# Patient Record
Sex: FEMALE | Race: BLACK OR AFRICAN AMERICAN | NOT HISPANIC OR LATINO | Employment: FULL TIME | URBAN - NONMETROPOLITAN AREA
[De-identification: names, ages, dates, MRNs, and addresses within clinical notes are randomized per-mention and may not be internally consistent; named-entity substitution may affect disease eponyms.]

---

## 2017-07-06 ENCOUNTER — LAB (OUTPATIENT)
Dept: LAB | Facility: HOSPITAL | Age: 37
End: 2017-07-06

## 2017-07-06 ENCOUNTER — OFFICE VISIT (OUTPATIENT)
Dept: CARDIOLOGY | Facility: CLINIC | Age: 37
End: 2017-07-06

## 2017-07-06 VITALS
DIASTOLIC BLOOD PRESSURE: 85 MMHG | BODY MASS INDEX: 36.16 KG/M2 | WEIGHT: 267 LBS | HEART RATE: 76 BPM | HEIGHT: 72 IN | SYSTOLIC BLOOD PRESSURE: 140 MMHG

## 2017-07-06 DIAGNOSIS — I71.019 AORTIC DISSECTION, THORACIC (HCC): ICD-10-CM

## 2017-07-06 DIAGNOSIS — Z98.890 HISTORY OF AORTIC ROOT REPAIR: ICD-10-CM

## 2017-07-06 DIAGNOSIS — I10 ESSENTIAL HYPERTENSION: ICD-10-CM

## 2017-07-06 DIAGNOSIS — Z98.890 H/O AORTIC ARCH REPAIR: ICD-10-CM

## 2017-07-06 DIAGNOSIS — I71.019 AORTIC DISSECTION, THORACIC (HCC): Primary | ICD-10-CM

## 2017-07-06 LAB
ALBUMIN SERPL-MCNC: 4 G/DL (ref 3.4–4.8)
ALBUMIN/GLOB SERPL: 1 G/DL (ref 1.1–1.8)
ALP SERPL-CCNC: 54 U/L (ref 38–126)
ALT SERPL W P-5'-P-CCNC: 25 U/L (ref 9–52)
ANION GAP SERPL CALCULATED.3IONS-SCNC: 12 MMOL/L (ref 5–15)
ARTICHOKE IGE QN: 83 MG/DL (ref 1–129)
AST SERPL-CCNC: 26 U/L (ref 14–36)
BASOPHILS # BLD AUTO: 0.01 10*3/MM3 (ref 0–0.2)
BASOPHILS NFR BLD AUTO: 0.2 % (ref 0–2)
BILIRUB SERPL-MCNC: 0.2 MG/DL (ref 0.2–1.3)
BUN BLD-MCNC: 22 MG/DL (ref 7–21)
BUN/CREAT SERPL: 12.4 (ref 7–25)
CALCIUM SPEC-SCNC: 8.8 MG/DL (ref 8.4–10.2)
CHLORIDE SERPL-SCNC: 106 MMOL/L (ref 95–110)
CHOLEST SERPL-MCNC: 159 MG/DL (ref 0–199)
CO2 SERPL-SCNC: 21 MMOL/L (ref 22–31)
CREAT BLD-MCNC: 1.78 MG/DL (ref 0.5–1)
DEPRECATED RDW RBC AUTO: 46.1 FL (ref 36.4–46.3)
EOSINOPHIL # BLD AUTO: 0.18 10*3/MM3 (ref 0–0.7)
EOSINOPHIL NFR BLD AUTO: 3.6 % (ref 0–7)
ERYTHROCYTE [DISTWIDTH] IN BLOOD BY AUTOMATED COUNT: 15.1 % (ref 11.5–14.5)
GFR SERPL CREATININE-BSD FRML MDRD: 39 ML/MIN/1.73 (ref 64–149)
GLOBULIN UR ELPH-MCNC: 3.9 GM/DL (ref 2.3–3.5)
GLUCOSE BLD-MCNC: 101 MG/DL (ref 60–100)
HCT VFR BLD AUTO: 32.9 % (ref 35–45)
HDLC SERPL-MCNC: 43 MG/DL (ref 60–200)
HGB BLD-MCNC: 10.4 G/DL (ref 12–15.5)
IMM GRANULOCYTES # BLD: 0.01 10*3/MM3 (ref 0–0.02)
IMM GRANULOCYTES NFR BLD: 0.2 % (ref 0–0.5)
LDLC/HDLC SERPL: 1.94 {RATIO} (ref 0–3.22)
LYMPHOCYTES # BLD AUTO: 1.66 10*3/MM3 (ref 0.6–4.2)
LYMPHOCYTES NFR BLD AUTO: 33.2 % (ref 10–50)
MCH RBC QN AUTO: 26.4 PG (ref 26.5–34)
MCHC RBC AUTO-ENTMCNC: 31.6 G/DL (ref 31.4–36)
MCV RBC AUTO: 83.5 FL (ref 80–98)
MONOCYTES # BLD AUTO: 0.4 10*3/MM3 (ref 0–0.9)
MONOCYTES NFR BLD AUTO: 8 % (ref 0–12)
NEUTROPHILS # BLD AUTO: 2.74 10*3/MM3 (ref 2–8.6)
NEUTROPHILS NFR BLD AUTO: 54.8 % (ref 37–80)
PLATELET # BLD AUTO: 262 10*3/MM3 (ref 150–450)
PMV BLD AUTO: 10.9 FL (ref 8–12)
POTASSIUM BLD-SCNC: 4.1 MMOL/L (ref 3.5–5.1)
PROT SERPL-MCNC: 7.9 G/DL (ref 6.3–8.6)
RBC # BLD AUTO: 3.94 10*6/MM3 (ref 3.77–5.16)
SODIUM BLD-SCNC: 139 MMOL/L (ref 137–145)
TRIGL SERPL-MCNC: 163 MG/DL (ref 20–199)
WBC NRBC COR # BLD: 5 10*3/MM3 (ref 3.2–9.8)

## 2017-07-06 PROCEDURE — 85025 COMPLETE CBC W/AUTO DIFF WBC: CPT | Performed by: INTERNAL MEDICINE

## 2017-07-06 PROCEDURE — 80061 LIPID PANEL: CPT | Performed by: INTERNAL MEDICINE

## 2017-07-06 PROCEDURE — 36415 COLL VENOUS BLD VENIPUNCTURE: CPT | Performed by: INTERNAL MEDICINE

## 2017-07-06 PROCEDURE — 80053 COMPREHEN METABOLIC PANEL: CPT | Performed by: INTERNAL MEDICINE

## 2017-07-06 PROCEDURE — 99213 OFFICE O/P EST LOW 20 MIN: CPT | Performed by: INTERNAL MEDICINE

## 2017-07-06 RX ORDER — MELATONIN
1000 DAILY
COMMUNITY
End: 2018-01-09

## 2017-07-06 RX ORDER — LOSARTAN POTASSIUM 100 MG/1
100 TABLET ORAL DAILY
COMMUNITY
End: 2021-09-21 | Stop reason: SDUPTHER

## 2017-07-06 NOTE — PROGRESS NOTES
Marta Chambers  36 y.o. female    07/06/2017  1. Aortic dissection, thoracic    2. Essential hypertension    3. H/O aortic arch repair    4. History of aortic root repair        History of Present Illness    Mrs. Chambers is here for follow-up of above stated problems.  She denied any chest pain or shortness of breath and reports that her blood pressure is in the normal range at home.  Her blood pressure however was noted to be elevated at 140/85 mmHg.  She has been compliant with her medications and I note that she was started on losartan by nephrology.  No signs of CHF was noted.        SUBJECTIVE    Allergies   Allergen Reactions   • Eggs Or Egg-Derived Products          Past Medical History:   Diagnosis Date   • Acute renal failure    • Anemia    • Aneurysm, aortic    • Aortic dissection    • Chest pain    • GERD (gastroesophageal reflux disease)    • Hemorrhoids    • Hypertension    • Obesity          Past Surgical History:   Procedure Laterality Date   • ASCENDING ARCH/HEMIARCH REPLACEMENT     • CARDIAC VALVE SURGERY     • HAND SURGERY     • OTHER SURGICAL HISTORY      aortic dissection repair   • TUBAL ABDOMINAL LIGATION           Family History   Problem Relation Age of Onset   • Sudden death Father      cardiac   • Other Father      ruptured aortic aneurysm   • Diabetes Other    • Hypertension Other          Social History     Social History   • Marital status: Single     Spouse name: N/A   • Number of children: N/A   • Years of education: N/A     Occupational History   • Not on file.     Social History Main Topics   • Smoking status: Former Smoker   • Smokeless tobacco: Never Used   • Alcohol use Yes      Comment: social   • Drug use: No   • Sexual activity: Defer     Other Topics Concern   • Not on file     Social History Narrative         Current Outpatient Prescriptions   Medication Sig Dispense Refill   • aspirin 81 MG chewable tablet Chew 81 mg Daily.     • Biotin 5000 MCG capsule Take 2 tablets by  "mouth Daily.     • cholecalciferol (VITAMIN D3) 1000 UNITS tablet Take 1,000 Units by mouth Daily.     • losartan (COZAAR) 50 MG tablet Take 50 mg by mouth Daily.     • metoprolol tartrate (LOPRESSOR) 50 MG tablet Take 1 tablet by mouth every 12 (twelve) hours.     • Multiple Vitamins-Minerals (MULTIVITAMIN ADULT PO) Take 1 tablet by mouth Daily.       No current facility-administered medications for this visit.          OBJECTIVE    /85  Pulse 76  Ht 72\" (182.9 cm)  Wt 267 lb (121 kg)  BMI 36.21 kg/m2        Review of Systems     Constitutional:  Denies recent weight loss, weight gain, fever or chills, no change in exercise tolerance     HENT:  Denies any hearing loss, epistaxis, hoarseness, or difficulty speaking.     Eyes: Wears eyeglasses or contact lenses     Respiratory:  Denies dyspnea with exertion,no cough, wheezing, or hemoptysis.     Cardiovascular: Negative for palpations, chest pain, orthopnea, PND, peripheral edema, syncope, or claudication.     Gastrointestinal:  Denies change in bowel habits, dyspepsia, ulcer disease, hematochezia, or melena.     Endocrine: Negative for cold intolerance, heat intolerance, polydipsia, polyphagia and polyuria. Denies any history of weight change, heat/cold intolerance, polydipsia, polyuria     Genitourinary: CKD      Musculoskeletal: Denies any history of arthritic symptoms or back problems     Skin:  Denies any change in hair or nails, rashes, or skin lesions.     Allergic/Immunologic: Negative.  Negative for environmental allergies, food allergies and immunocompromised state.     Neurological:  Denies any history of recurrent headaches, strokes, TIA, or seizure disorder.     Hematological: Denies any food allergies, seasonal allergies, bleeding disorders, or lymphadenopathy.       Physical Exam     Constitutional: Cooperative, alert and oriented, well-developed, well-nourished, in no acute distress.     HENT:   Head: Normocephalic, normal hair patterns, no " masses or tenderness.  Ears, Nose, and Throat: No gross abnormalities. No pallor or cyanosis.  Eyes: EOMS intact, PERRL, conjunctivae and lids unremarkable. Fundoscopic exam and visual fields not performed.   Neck: No palpable masses or adenopathy, no thyromegaly, no JVD, carotid pulses are full and equal bilaterally and without  Bruits.     Cardiovascular: Regular rhythm, S1 and S2 normal, no S3 or S4. 2/6 systolic murmur, gallops, or rubs detected.     Pulmonary/Chest: Chest: normal symmetry, no tenderness to palpation, normal respiratory excursion, no use of accessory muscles.            Pulmonary: Normal breath sounds. No rales or ronchi.    Abdominal: Abdomen soft, bowel sounds normoactive, no masses, no hepatosplenomegaly, non-tender, no bruits.     Musculoskeletal: No deformities, clubbing, cyanosis, erythema, or edema observed. There are no spinal abnormalities noted. Normal muscle strength and tone. Pulses full and equal in all extremities, no bruits auscultated.     Neurological: No gross motor or sensory deficits noted, affect appropriate, oriented to time, person, place.     Skin: Warm and dry to the touch, no apparent skin lesions or masses noted.     Psychiatric: She has a normal mood and affect. Her behavior is normal. Judgment and thought content normal.         Procedures      Lab Results   Component Value Date    WBC 5.00 07/06/2017    HGB 10.4 (L) 07/06/2017    HCT 32.9 (L) 07/06/2017    MCV 83.5 07/06/2017     07/06/2017     Lab Results   Component Value Date    GLUCOSE 75 02/13/2016    BUN 19 02/13/2016    CREATININE 1.6 (H) 02/13/2016    CO2 22 02/13/2016    CALCIUM 8.8 02/13/2016    ALBUMIN 3.8 02/13/2016    AST 44 (H) 02/13/2016    ALT 19 02/13/2016     No results found for: CHOL  Lab Results   Component Value Date    TRIG 77 10/22/2014     Lab Results   Component Value Date    HDL 44 10/22/2014     No results found for: LDLCALC  Lab Results   Component Value Date    LDL 84 10/22/2014      No results found for: HDLLDLRATIO  No components found for: CHOLHDL  Lab Results   Component Value Date    HGBA1C 5.3 10/23/2014     Lab Results   Component Value Date    TSH 0.30 (L) 02/13/2016           ASSESSMENT AND PLAN    Mrs. Chambers is stable as regards her heart with no evidence of angina or arrhythmia.  Her aortic valve appears to be functioning well.  To assess the left ventricle and valvular function and echocardiogram is being arranged and the aortic root size will be assessed.  I have continued her present medications including losartan and metoprolol.  I have asked her to keep a close watch on her blood pressure.  Lab tests indicated below have been ordered.  Diagnoses and all orders for this visit:    Aortic dissection, thoracic  -     Adult Transthoracic Echo Complete; Future  -     Lipid Panel; Future  -     Comprehensive Metabolic Panel  -     CBC & Differential  -     CBC Auto Differential    Essential hypertension  -     Adult Transthoracic Echo Complete; Future  -     Lipid Panel; Future  -     Comprehensive Metabolic Panel  -     CBC & Differential  -     CBC Auto Differential    H/O aortic arch repair  -     Adult Transthoracic Echo Complete; Future  -     Lipid Panel; Future  -     Comprehensive Metabolic Panel  -     CBC & Differential  -     CBC Auto Differential    History of aortic root repair  -     Adult Transthoracic Echo Complete; Future  -     Lipid Panel; Future  -     Comprehensive Metabolic Panel  -     CBC & Differential  -     CBC Auto Differential        Katerina Solis MD  7/6/2017  5:46 PM

## 2017-08-01 ENCOUNTER — TELEPHONE (OUTPATIENT)
Dept: CARDIOLOGY | Facility: CLINIC | Age: 37
End: 2017-08-01

## 2018-01-09 ENCOUNTER — OFFICE VISIT (OUTPATIENT)
Dept: CARDIOLOGY | Facility: CLINIC | Age: 38
End: 2018-01-09

## 2018-01-09 VITALS
WEIGHT: 265 LBS | BODY MASS INDEX: 35.89 KG/M2 | SYSTOLIC BLOOD PRESSURE: 142 MMHG | HEIGHT: 72 IN | HEART RATE: 64 BPM | DIASTOLIC BLOOD PRESSURE: 84 MMHG

## 2018-01-09 DIAGNOSIS — Z98.890 H/O AORTIC ARCH REPAIR: ICD-10-CM

## 2018-01-09 DIAGNOSIS — Z98.890 HISTORY OF AORTIC ROOT REPAIR: ICD-10-CM

## 2018-01-09 DIAGNOSIS — I10 ESSENTIAL HYPERTENSION: ICD-10-CM

## 2018-01-09 DIAGNOSIS — I71.019 AORTIC DISSECTION, THORACIC (HCC): Primary | ICD-10-CM

## 2018-01-09 PROCEDURE — 99214 OFFICE O/P EST MOD 30 MIN: CPT | Performed by: INTERNAL MEDICINE

## 2018-01-09 NOTE — PROGRESS NOTES
Marta Chambers  37 y.o. female    01/09/2018  1. Aortic dissection, thoracic    2. Essential hypertension    3. History of aortic root repair    4. H/O aortic arch repair        History of Present Illness    Ms. Chambers is here for follow-up of above stated problems.  She denied any chest pain, shortness of breath or palpitation.  Her blood pressure was noted to be mildly elevated and she indicates that she is under stress when she drives to see the doctor.  I have advised her to keep close watch on her blood pressure and if it is consistently elevated but could consider starting her on amlodipine 5 mg daily.  Her other issue is increased had a loss and wondered if this could be related to beta blockers.  Though it is possible, given her history of aortic dissection, I have advised her to continue beta blockers for now.  She will consider using Nioxin cream which has low dose minoxidil, to see if this will improve hair loss.  His could also help with her blood pressure.  She will be seen her primary care provider soon and will have blood work done.  She is known to have CK D.        SUBJECTIVE    No Known Allergies      Past Medical History:   Diagnosis Date   • Acute renal failure    • Anemia    • Aneurysm, aortic    • Aortic dissection    • Chest pain    • GERD (gastroesophageal reflux disease)    • Hemorrhoids    • Hypertension    • Obesity          Past Surgical History:   Procedure Laterality Date   • ASCENDING ARCH/HEMIARCH REPLACEMENT     • CARDIAC VALVE SURGERY     • HAND SURGERY     • OTHER SURGICAL HISTORY      aortic dissection repair   • TUBAL ABDOMINAL LIGATION           Family History   Problem Relation Age of Onset   • Sudden death Father      cardiac   • Other Father      ruptured aortic aneurysm   • Diabetes Other    • Hypertension Other          Social History     Social History   • Marital status: Single     Spouse name: N/A   • Number of children: N/A   • Years of education: N/A     Occupational  "History   • Not on file.     Social History Main Topics   • Smoking status: Former Smoker   • Smokeless tobacco: Never Used   • Alcohol use Yes      Comment: social   • Drug use: No   • Sexual activity: Defer     Other Topics Concern   • Not on file     Social History Narrative         Current Outpatient Prescriptions   Medication Sig Dispense Refill   • aspirin 81 MG chewable tablet Chew 81 mg Daily.     • Biotin 5000 MCG capsule Take 2 tablets by mouth Daily.     • losartan (COZAAR) 50 MG tablet Take 50 mg by mouth Daily.     • metoprolol tartrate (LOPRESSOR) 50 MG tablet Take 1 tablet by mouth every 12 (twelve) hours.     • Multiple Vitamins-Minerals (MULTIVITAMIN ADULT PO) Take 1 tablet by mouth Daily.       No current facility-administered medications for this visit.          OBJECTIVE    /84  Pulse 64  Ht 182.9 cm (72\")  Wt 120 kg (265 lb)  BMI 35.94 kg/m2        Review of Systems     Constitutional:  Denies recent weight loss, weight gain, fever or chills, no change in exercise tolerance     HENT:  Denies any hearing loss, epistaxis, hoarseness, or difficulty speaking. Hair loss    Eyes: Wears eyeglasses or contact lenses     Respiratory:  Denies dyspnea with exertion,no cough, wheezing, or hemoptysis.     Cardiovascular: Negative for palpations, chest pain, orthopnea, PND, peripheral edema, syncope, or claudication.     Gastrointestinal:  Denies change in bowel habits, dyspepsia, ulcer disease, hematochezia, or melena.     Endocrine: Negative for cold intolerance, heat intolerance, polydipsia, polyphagia and polyuria. Denies any history of weight change, heat/cold intolerance, polydipsia, polyuria     Genitourinary: Negative.      Musculoskeletal: Denies any history of arthritic symptoms or back problems     Skin:  Denies any change in hair or nails, rashes, or skin lesions.     Allergic/Immunologic: Negative.  Negative for environmental allergies, food allergies and immunocompromised state. "     Neurological:  Denies any history of recurrent headaches, strokes, TIA, or seizure disorder.     Hematological: Denies any food allergies, seasonal allergies, bleeding disorders, or lymphadenopathy.     Psychiatric/Behavioral: Denies any history of depression, substance abuse, or change in cognitive function.         Physical Exam     Constitutional: Cooperative, alert and oriented, well-developed, well-nourished, in no acute distress.     HENT:   Head: Normocephalic, normal hair patterns, no masses or tenderness.  Ears, Nose, and Throat: No gross abnormalities. No pallor or cyanosis. Dentition good.   Eyes: EOMS intact, PERRL, conjunctivae and lids unremarkable. Fundoscopic exam and visual fields not performed.   Neck: No palpable masses or adenopathy, no thyromegaly, no JVD, carotid pulses are full and equal bilaterally and without  Bruits.     Cardiovascular: Regular rhythm, S1 and S2 normal, no S3 or S4. Apical impulse not displaced. No murmurs, gallops, or rubs detected.     Pulmonary/Chest: Chest: normal symmetry, no tenderness to palpation, normal respiratory excursion, no intercostal retraction, no use of accessory muscles.            Pulmonary: Normal breath sounds. No rales or ronchi.    Abdominal: Abdomen soft, bowel sounds normoactive, no masses, no hepatosplenomegaly, non-tender, no bruits.     Musculoskeletal: No deformities, clubbing, cyanosis, erythema, or edema observed. There are no spinal abnormalities noted. Normal muscle strength and tone. Pulses full and equal in all extremities, no bruits auscultated.     Neurological: No gross motor or sensory deficits noted, affect appropriate, oriented to time, person, place.     Skin: Warm and dry to the touch, no apparent skin lesions or masses noted.     Psychiatric: She has a normal mood and affect. Her behavior is normal. Judgment and thought content normal.         Procedures      Lab Results   Component Value Date    WBC 5.00 07/06/2017    HGB  10.4 (L) 07/06/2017    HCT 32.9 (L) 07/06/2017    MCV 83.5 07/06/2017     07/06/2017     Lab Results   Component Value Date    GLUCOSE 101 (H) 07/06/2017    BUN 22 (H) 07/06/2017    CREATININE 1.78 (H) 07/06/2017    EGFRIFAFRI 39 (L) 07/06/2017    BCR 12.4 07/06/2017    CO2 21.0 (L) 07/06/2017    CALCIUM 8.8 07/06/2017    ALBUMIN 4.00 07/06/2017    LABIL2 1.0 (L) 07/06/2017    AST 26 07/06/2017    ALT 25 07/06/2017     Lab Results   Component Value Date    CHOL 159 07/06/2017     Lab Results   Component Value Date    TRIG 163 07/06/2017    TRIG 77 10/22/2014     Lab Results   Component Value Date    HDL 43 (L) 07/06/2017    HDL 44 10/22/2014     No results found for: LDLCALC  Lab Results   Component Value Date    LDL 84 10/22/2014     No results found for: HDLLDLRATIO  No components found for: CHOLHDL  Lab Results   Component Value Date    HGBA1C 5.3 10/23/2014     Lab Results   Component Value Date    TSH 0.30 (L) 02/13/2016           ASSESSMENT AND PLAN  Ms. Chambers is stable with no definite evidence of angina, arrhythmia or congestive heart failure.  I have advised her to keep a close watch and a blood pressure.  Present antiplatelet therapy with aspirin, antihypertensive therapy with losartan and metoprolol tartrate have been continued.    Marta was seen today for follow-up.    Diagnoses and all orders for this visit:    Aortic dissection, thoracic    Essential hypertension    History of aortic root repair    H/O aortic arch repair        Katerina Solis MD  1/9/2018  9:27 AM

## 2018-02-01 RX ORDER — METOPROLOL TARTRATE 50 MG/1
50 TABLET, FILM COATED ORAL EVERY 12 HOURS
Qty: 60 TABLET | Refills: 6 | Status: SHIPPED | OUTPATIENT
Start: 2018-02-01 | End: 2018-09-26 | Stop reason: SDUPTHER

## 2018-06-04 ENCOUNTER — OFFICE VISIT (OUTPATIENT)
Dept: FAMILY MEDICINE CLINIC | Facility: CLINIC | Age: 38
End: 2018-06-04

## 2018-06-04 VITALS
SYSTOLIC BLOOD PRESSURE: 166 MMHG | HEIGHT: 72 IN | BODY MASS INDEX: 35.35 KG/M2 | DIASTOLIC BLOOD PRESSURE: 92 MMHG | WEIGHT: 261 LBS | RESPIRATION RATE: 20 BRPM | HEART RATE: 68 BPM | OXYGEN SATURATION: 99 % | TEMPERATURE: 98.4 F

## 2018-06-04 DIAGNOSIS — I10 ESSENTIAL HYPERTENSION: Primary | ICD-10-CM

## 2018-06-04 DIAGNOSIS — M35.00 SJOGREN'S SYNDROME, WITH UNSPECIFIED ORGAN INVOLVEMENT (HCC): ICD-10-CM

## 2018-06-04 DIAGNOSIS — N18.9 CHRONIC KIDNEY DISEASE, UNSPECIFIED CKD STAGE: ICD-10-CM

## 2018-06-04 DIAGNOSIS — Z98.890 H/O AORTIC ARCH REPAIR: ICD-10-CM

## 2018-06-04 PROCEDURE — 99214 OFFICE O/P EST MOD 30 MIN: CPT | Performed by: NURSE PRACTITIONER

## 2018-06-04 RX ORDER — LOSARTAN POTASSIUM 25 MG/1
25 TABLET ORAL DAILY
COMMUNITY
End: 2020-01-27 | Stop reason: ALTCHOICE

## 2018-06-04 NOTE — PROGRESS NOTES
Subjective   Marta Chambers is a 37 y.o. female.     Here today to establish.  Was a patient of dr contreras, but has not actually seen a pcp in about 2 years.  She does see a cardiologist for hx of dissecting aorta, when this repair was done she was found to have renal disease with an elevated creatinine,  This is treated by a nephrologist.  She also sees a rheumatologist for sjogrens.   All are controlled.      Hypertension   This is a chronic problem. The problem is controlled. Pertinent negatives include no anxiety, blurred vision, chest pain, headaches, malaise/fatigue, neck pain, orthopnea, palpitations, peripheral edema, PND or sweats. There are no associated agents to hypertension. Risk factors: see above history. Past treatments include angiotensin blockers and beta blockers. Current antihypertension treatment includes beta blockers and angiotensin blockers. The current treatment provides significant improvement. There are no compliance problems.  Hypertensive end-organ damage includes kidney disease. There is no history of angina, CAD/MI, CVA, heart failure, left ventricular hypertrophy, PVD or retinopathy.   Chronic Kidney Disease   This is a chronic problem. The current episode started more than 1 year ago. The problem occurs constantly. Progression since onset: stable. Pertinent negatives include no abdominal pain, anorexia, arthralgias, change in bowel habit, chest pain, chills, congestion, coughing, diaphoresis, fatigue, fever, headaches, joint swelling, myalgias, nausea, neck pain, numbness, rash, sore throat, swollen glands, urinary symptoms, vertigo, visual change, vomiting or weakness. Nothing aggravates the symptoms.        The following portions of the patient's history were reviewed and updated as appropriate: allergies, current medications, past family history, past medical history, past social history, past surgical history and problem list.    Review of Systems   Constitutional:  Negative.  Negative for chills, diaphoresis, fatigue, fever and malaise/fatigue.   HENT: Negative.  Negative for congestion and sore throat.    Eyes: Negative for blurred vision.   Respiratory: Negative.  Negative for cough.    Cardiovascular: Negative.  Negative for chest pain, palpitations, orthopnea and PND.   Gastrointestinal: Negative.  Negative for abdominal pain, anorexia, change in bowel habit, nausea and vomiting.   Musculoskeletal: Negative.  Negative for arthralgias, joint swelling, myalgias and neck pain.   Skin: Negative.  Negative for rash.   Neurological: Negative.  Negative for vertigo, weakness and numbness.   Psychiatric/Behavioral: Negative.        Objective   Physical Exam   Constitutional: She is oriented to person, place, and time. She appears well-developed and well-nourished. No distress.   HENT:   Head: Normocephalic.   Eyes: Pupils are equal, round, and reactive to light.   Neck: Normal range of motion. Neck supple. No thyromegaly present.   Cardiovascular: Normal rate, regular rhythm and normal heart sounds.  Exam reveals no friction rub.    No murmur heard.  Pulmonary/Chest: Effort normal and breath sounds normal. No respiratory distress. She has no wheezes. She has no rales.   Abdominal: Soft.   Musculoskeletal: Normal range of motion.   Neurological: She is alert and oriented to person, place, and time.   Skin: Skin is warm and dry.   Psychiatric: She has a normal mood and affect. Thought content normal.   Nursing note and vitals reviewed.        Assessment/Plan   Marta was seen today for hypertension and chronic kidney disease.    Diagnoses and all orders for this visit:    Essential hypertension  -     Comprehensive metabolic panel; Future  -     Lipid panel; Future    H/O aortic arch repair    Chronic kidney disease, unspecified CKD stage    Sjogren's syndrome, with unspecified organ involvement      She does not need any refills at present, but will be needing new labs.  She will  see her cardiologist in the next 2 months.

## 2018-06-29 ENCOUNTER — LAB (OUTPATIENT)
Dept: LAB | Facility: CLINIC | Age: 38
End: 2018-06-29

## 2018-06-29 DIAGNOSIS — I10 ESSENTIAL HYPERTENSION: ICD-10-CM

## 2018-06-29 LAB
ALBUMIN SERPL-MCNC: 3.9 G/DL (ref 3.4–4.8)
ALBUMIN/GLOB SERPL: 1.1 G/DL (ref 1.1–1.8)
ALP SERPL-CCNC: 45 U/L (ref 38–126)
ALT SERPL W P-5'-P-CCNC: 21 U/L (ref 9–52)
ANION GAP SERPL CALCULATED.3IONS-SCNC: 12 MMOL/L (ref 5–15)
ARTICHOKE IGE QN: 80 MG/DL (ref 1–129)
AST SERPL-CCNC: 21 U/L (ref 14–36)
BILIRUB SERPL-MCNC: 0.2 MG/DL (ref 0.2–1.3)
BUN BLD-MCNC: 20 MG/DL (ref 7–21)
BUN/CREAT SERPL: 12.2 (ref 7–25)
CALCIUM SPEC-SCNC: 9 MG/DL (ref 8.4–10.2)
CHLORIDE SERPL-SCNC: 109 MMOL/L (ref 95–110)
CHOLEST SERPL-MCNC: 157 MG/DL (ref 0–199)
CO2 SERPL-SCNC: 19 MMOL/L (ref 22–31)
CREAT BLD-MCNC: 1.64 MG/DL (ref 0.5–1)
GFR SERPL CREATININE-BSD FRML MDRD: 42 ML/MIN/1.73 (ref 64–149)
GLOBULIN UR ELPH-MCNC: 3.6 GM/DL (ref 2.3–3.5)
GLUCOSE BLD-MCNC: 82 MG/DL (ref 60–100)
HDLC SERPL-MCNC: 48 MG/DL (ref 60–200)
LDLC/HDLC SERPL: 1.92 {RATIO} (ref 0–3.22)
POTASSIUM BLD-SCNC: 4.1 MMOL/L (ref 3.5–5.1)
PROT SERPL-MCNC: 7.5 G/DL (ref 6.3–8.6)
SODIUM BLD-SCNC: 140 MMOL/L (ref 137–145)
TRIGL SERPL-MCNC: 84 MG/DL (ref 20–199)

## 2018-06-29 PROCEDURE — 80053 COMPREHEN METABOLIC PANEL: CPT | Performed by: NURSE PRACTITIONER

## 2018-06-29 PROCEDURE — 80061 LIPID PANEL: CPT | Performed by: NURSE PRACTITIONER

## 2018-06-29 PROCEDURE — 36415 COLL VENOUS BLD VENIPUNCTURE: CPT | Performed by: NURSE PRACTITIONER

## 2018-09-12 ENCOUNTER — OFFICE VISIT (OUTPATIENT)
Dept: CARDIOLOGY | Facility: CLINIC | Age: 38
End: 2018-09-12

## 2018-09-12 VITALS
OXYGEN SATURATION: 100 % | SYSTOLIC BLOOD PRESSURE: 118 MMHG | HEART RATE: 77 BPM | DIASTOLIC BLOOD PRESSURE: 80 MMHG | HEIGHT: 72 IN | WEIGHT: 255 LBS | BODY MASS INDEX: 34.54 KG/M2

## 2018-09-12 DIAGNOSIS — I10 ESSENTIAL HYPERTENSION: Primary | ICD-10-CM

## 2018-09-12 DIAGNOSIS — I71.019 AORTIC DISSECTION, THORACIC (HCC): ICD-10-CM

## 2018-09-12 DIAGNOSIS — Z98.890 H/O AORTIC ARCH REPAIR: ICD-10-CM

## 2018-09-12 PROCEDURE — 99214 OFFICE O/P EST MOD 30 MIN: CPT | Performed by: INTERNAL MEDICINE

## 2018-09-12 NOTE — PROGRESS NOTES
Marta Chambers  37 y.o. female    09/12/2018  1. Essential hypertension    2. Aortic dissection, thoracic (CMS/HCC)    3. H/O aortic arch repair        History of Present Illness    Mrs. Chambers is here for follow-up of her above stated problems.  She has done well from a symptom standpoint and denied any chest pain, shortness of breath, palpitation, dizziness or syncope.  Her blood pressure was in the normal range.  Her CK D is been being monitored closely by nephrology and I see that HCTZ has been discontinued.  She is on 75 mg of losartan daily.        SUBJECTIVE    No Known Allergies      Past Medical History:   Diagnosis Date   • Acute renal failure (CMS/HCC)    • Anemia    • Aneurysm, aortic (CMS/HCC)    • Aortic dissection (CMS/HCC)    • Chest pain    • GERD (gastroesophageal reflux disease)    • Hemorrhoids    • Hypertension    • Obesity          Past Surgical History:   Procedure Laterality Date   • ASCENDING ARCH/HEMIARCH REPLACEMENT     • CARDIAC VALVE SURGERY     • HAND SURGERY     • OTHER SURGICAL HISTORY      aortic dissection repair   • TUBAL ABDOMINAL LIGATION           Family History   Problem Relation Age of Onset   • Sudden death Father         cardiac   • Other Father         ruptured aortic aneurysm   • Diabetes Other    • Hypertension Other          Social History     Social History   • Marital status: Single     Spouse name: N/A   • Number of children: N/A   • Years of education: N/A     Occupational History   • Not on file.     Social History Main Topics   • Smoking status: Former Smoker   • Smokeless tobacco: Never Used   • Alcohol use Yes      Comment: social   • Drug use: No   • Sexual activity: Defer     Other Topics Concern   • Not on file     Social History Narrative   • No narrative on file         Current Outpatient Prescriptions   Medication Sig Dispense Refill   • aspirin 81 MG chewable tablet Chew 81 mg Daily.     • Biotin 5000 MCG capsule Take 2 tablets by mouth Daily.     •  "losartan (COZAAR) 50 MG tablet Take 50 mg by mouth Daily.     • metoprolol tartrate (LOPRESSOR) 50 MG tablet Take 1 tablet by mouth Every 12 (Twelve) Hours. 60 tablet 6   • Multiple Vitamins-Minerals (MULTIVITAMIN ADULT PO) Take 1 tablet by mouth Daily.     • losartan (COZAAR) 25 MG tablet Take 25 mg by mouth Daily.       No current facility-administered medications for this visit.          OBJECTIVE    /80   Pulse 77   Ht 182.9 cm (72.01\")   Wt 116 kg (255 lb)   SpO2 100%   BMI 34.58 kg/m²         Review of Systems     Constitutional:  Denies recent weight loss, weight gain, fever or chills, no change in exercise tolerance     HENT:  Denies any hearing loss, epistaxis, hoarseness, or difficulty speaking.     Eyes: Wears eyeglasses or contact lenses     Respiratory:  Denies dyspnea with exertion,no cough, wheezing, or hemoptysis.     Cardiovascular: Negative for palpations, chest pain, orthopnea, PND, peripheral edema, syncope, or claudication.     Gastrointestinal:  Denies change in bowel habits, dyspepsia, ulcer disease, hematochezia, or melena.     Endocrine: Negative for cold intolerance, heat intolerance, polydipsia, polyphagia and polyuria.     Genitourinary: Negative.      Musculoskeletal: Denies any history of arthritic symptoms or back problems     Skin:  Denies any change in hair or nails, rashes, or skin lesions.     Allergic/Immunologic: Negative.  Negative for environmental allergies, food allergies and immunocompromised state.     Neurological:  Denies any history of recurrent headaches, strokes, TIA, or seizure disorder.     Hematological: Denies any food allergies, seasonal allergies, bleeding disorders, or lymphadenopathy.     Psychiatric/Behavioral: Denies any history of depression, substance abuse, or change in cognitive function.         Physical Exam     Constitutional: Cooperative, alert and oriented,  in no acute distress.     HENT:   Head: Normocephalic, normal hair patterns, no " masses or tenderness.  Ears, Nose, and Throat: No gross abnormalities. No pallor or cyanosis.   Eyes: EOMS intact, PERRL, conjunctivae and lids unremarkable. Fundoscopic exam and visual fields not performed.   Neck: No palpable masses or adenopathy, no thyromegaly, no JVD, carotid pulses are full and equal bilaterally and without  Bruits.     Cardiovascular: Regular rhythm, S1 and S2 normal, no S3 or S4.  No murmurs, gallops, or rubs detected.     Pulmonary/Chest: Chest: normal symmetry,  normal respiratory excursion, no intercostal retraction, no use of accessory muscles.            Pulmonary: Normal breath sounds. No rales or ronchi.    Abdominal: Abdomen soft, bowel sounds normoactive, no masses, no hepatosplenomegaly, non-tender, no bruits.     Musculoskeletal: No deformities, clubbing, cyanosis, erythema, or edema observed.     Neurological: No gross motor or sensory deficits noted, affect appropriate, oriented to time, person, place.     Skin: Warm and dry to the touch, no apparent skin lesions or masses noted.     Psychiatric: She has a normal mood and affect. Her behavior is normal. Judgment and thought content normal.         Procedures      Lab Results   Component Value Date    WBC 5.00 07/06/2017    HGB 10.4 (L) 07/06/2017    HCT 32.9 (L) 07/06/2017    MCV 83.5 07/06/2017     07/06/2017     Lab Results   Component Value Date    GLUCOSE 82 06/29/2018    BUN 20 06/29/2018    CREATININE 1.64 (H) 06/29/2018    EGFRIFAFRI 42 (L) 06/29/2018    BCR 12.2 06/29/2018    CO2 19.0 (L) 06/29/2018    CALCIUM 9.0 06/29/2018    ALBUMIN 3.90 06/29/2018    AST 21 06/29/2018    ALT 21 06/29/2018     Lab Results   Component Value Date    CHOL 157 06/29/2018    CHOL 159 07/06/2017     Lab Results   Component Value Date    TRIG 84 06/29/2018    TRIG 163 07/06/2017    TRIG 77 10/22/2014     Lab Results   Component Value Date    HDL 48 (L) 06/29/2018    HDL 43 (L) 07/06/2017    HDL 44 10/22/2014     No components found  for: LDLCALC  Lab Results   Component Value Date    LDL 80 06/29/2018    LDL 83 07/06/2017    LDL 84 10/22/2014     No results found for: HDLLDLRATIO  No components found for: CHOLHDL  Lab Results   Component Value Date    HGBA1C 5.3 10/23/2014     Lab Results   Component Value Date    TSH 0.30 (L) 02/13/2016           ASSESSMENT AND PLAN  Ms. Chambers is stable with no definite evidence of angina, arrhythmia or congestive heart failure.  Antiplatelet therapy with aspirin, antihypertensive therapy with losartan and metoprolol tartrate have been continued.  She still smokes about a pack of cigarettes every 5 days and I have strongly urged her to quit.    Marta was seen today for follow-up.    Diagnoses and all orders for this visit:    Essential hypertension    Aortic dissection, thoracic (CMS/HCC)    H/O aortic arch repair        Katerina Solis MD  9/12/2018  10:15 AM

## 2018-09-26 RX ORDER — METOPROLOL TARTRATE 50 MG/1
TABLET, FILM COATED ORAL
Qty: 60 TABLET | Refills: 5 | Status: SHIPPED | OUTPATIENT
Start: 2018-09-26 | End: 2019-03-24 | Stop reason: SDUPTHER

## 2018-12-04 ENCOUNTER — OFFICE VISIT (OUTPATIENT)
Dept: FAMILY MEDICINE CLINIC | Facility: CLINIC | Age: 38
End: 2018-12-04

## 2018-12-04 VITALS
DIASTOLIC BLOOD PRESSURE: 85 MMHG | WEIGHT: 259 LBS | HEIGHT: 72 IN | HEART RATE: 67 BPM | TEMPERATURE: 98.2 F | RESPIRATION RATE: 18 BRPM | OXYGEN SATURATION: 99 % | BODY MASS INDEX: 35.08 KG/M2 | SYSTOLIC BLOOD PRESSURE: 138 MMHG

## 2018-12-04 DIAGNOSIS — I10 ESSENTIAL HYPERTENSION: Primary | ICD-10-CM

## 2018-12-04 DIAGNOSIS — N18.9 CHRONIC KIDNEY DISEASE, UNSPECIFIED CKD STAGE: ICD-10-CM

## 2018-12-04 PROCEDURE — 99213 OFFICE O/P EST LOW 20 MIN: CPT | Performed by: NURSE PRACTITIONER

## 2018-12-04 NOTE — PROGRESS NOTES
Subjective   Marta Chambers is a 38 y.o. female.     Here today for kathleen on her htn.  She does not need refills today and has had labs drawn recently.  She reports that she has renal insuff, but is controlled and cont to see the nephrologist.      Hypertension   This is a chronic problem. The current episode started more than 1 year ago. The problem has been waxing and waning since onset. The problem is controlled. Associated symptoms include peripheral edema. Pertinent negatives include no anxiety, blurred vision, chest pain, headaches, malaise/fatigue, neck pain, orthopnea, palpitations, PND, shortness of breath or sweats. There are no associated agents to hypertension. Risk factors for coronary artery disease include obesity and sedentary lifestyle. Past treatments include beta blockers and angiotensin blockers. Current antihypertension treatment includes angiotensin blockers and beta blockers. The current treatment provides significant improvement. There are no compliance problems.  Hypertensive end-organ damage includes kidney disease.        The following portions of the patient's history were reviewed and updated as appropriate: allergies, current medications, past family history, past medical history, past social history, past surgical history and problem list.    Review of Systems   Constitutional: Negative.  Negative for malaise/fatigue.   HENT: Negative.    Eyes: Negative for blurred vision.   Respiratory: Negative.  Negative for shortness of breath.    Cardiovascular: Negative.  Negative for chest pain, palpitations, orthopnea and PND.   Musculoskeletal: Negative.  Negative for neck pain.   Skin: Negative.    Neurological: Negative.    Psychiatric/Behavioral: Negative.        Objective   Physical Exam   Constitutional: She is oriented to person, place, and time. She appears well-developed and well-nourished. No distress.   HENT:   Head: Normocephalic.   Eyes: Pupils are equal, round, and reactive to  light.   Neck: Neck supple. No thyromegaly present.   Cardiovascular: Normal rate, regular rhythm and normal heart sounds. Exam reveals no friction rub.   No murmur heard.  Pulmonary/Chest: Effort normal and breath sounds normal. No stridor. No respiratory distress. She has no wheezes. She has no rales.   Abdominal: Soft.   Musculoskeletal: Normal range of motion.   Neurological: She is alert and oriented to person, place, and time.   Skin: Skin is warm and dry.   Psychiatric: She has a normal mood and affect. Thought content normal.   Nursing note and vitals reviewed.        Assessment/Plan   Marta was seen today for hypertension and quit smoking.    Diagnoses and all orders for this visit:    Essential hypertension    Chronic kidney disease, unspecified CKD stage    no changes in meds.    Lab Results   Component Value Date    GLUCOSE 82 06/29/2018    BUN 20 06/29/2018    CREATININE 1.64 (H) 06/29/2018    EGFRIFAFRI 42 (L) 06/29/2018    BCR 12.2 06/29/2018    K 4.1 06/29/2018    CO2 19.0 (L) 06/29/2018    CALCIUM 9.0 06/29/2018    ALBUMIN 3.90 06/29/2018    AST 21 06/29/2018    ALT 21 06/29/2018

## 2019-03-25 RX ORDER — METOPROLOL TARTRATE 50 MG/1
TABLET, FILM COATED ORAL
Qty: 60 TABLET | Refills: 4 | Status: SHIPPED | OUTPATIENT
Start: 2019-03-25 | End: 2019-08-19 | Stop reason: SDUPTHER

## 2019-06-18 ENCOUNTER — OFFICE VISIT (OUTPATIENT)
Dept: FAMILY MEDICINE CLINIC | Facility: CLINIC | Age: 39
End: 2019-06-18

## 2019-06-18 ENCOUNTER — APPOINTMENT (OUTPATIENT)
Dept: LAB | Facility: HOSPITAL | Age: 39
End: 2019-06-18

## 2019-06-18 VITALS
WEIGHT: 259 LBS | HEART RATE: 66 BPM | DIASTOLIC BLOOD PRESSURE: 72 MMHG | HEIGHT: 72 IN | SYSTOLIC BLOOD PRESSURE: 125 MMHG | TEMPERATURE: 98.1 F | BODY MASS INDEX: 35.08 KG/M2 | OXYGEN SATURATION: 99 % | RESPIRATION RATE: 18 BRPM

## 2019-06-18 DIAGNOSIS — N18.9 CHRONIC KIDNEY DISEASE, UNSPECIFIED CKD STAGE: ICD-10-CM

## 2019-06-18 DIAGNOSIS — D50.9 IRON DEFICIENCY ANEMIA, UNSPECIFIED IRON DEFICIENCY ANEMIA TYPE: ICD-10-CM

## 2019-06-18 DIAGNOSIS — I10 ESSENTIAL HYPERTENSION: ICD-10-CM

## 2019-06-18 DIAGNOSIS — R53.83 OTHER FATIGUE: Primary | ICD-10-CM

## 2019-06-18 LAB
25(OH)D3 SERPL-MCNC: 15.9 NG/ML (ref 30–100)
BASOPHILS # BLD AUTO: 0.01 10*3/MM3 (ref 0–0.2)
BASOPHILS NFR BLD AUTO: 0.2 % (ref 0–1.5)
DEPRECATED RDW RBC AUTO: 62.7 FL (ref 37–54)
EOSINOPHIL # BLD AUTO: 0.09 10*3/MM3 (ref 0–0.4)
EOSINOPHIL NFR BLD AUTO: 2.2 % (ref 0.3–6.2)
ERYTHROCYTE [DISTWIDTH] IN BLOOD BY AUTOMATED COUNT: 20.4 % (ref 12.3–15.4)
HCT VFR BLD AUTO: 30.1 % (ref 34–46.6)
HGB BLD-MCNC: 8.7 G/DL (ref 12–15.9)
IMM GRANULOCYTES # BLD AUTO: 0.01 10*3/MM3 (ref 0–0.05)
IMM GRANULOCYTES NFR BLD AUTO: 0.2 % (ref 0–0.5)
IRON 24H UR-MRATE: 22 MCG/DL (ref 37–145)
IRON SATN MFR SERPL: 6 % (ref 20–50)
LYMPHOCYTES # BLD AUTO: 1.17 10*3/MM3 (ref 0.7–3.1)
LYMPHOCYTES NFR BLD AUTO: 28.5 % (ref 19.6–45.3)
MCH RBC QN AUTO: 24.4 PG (ref 26.6–33)
MCHC RBC AUTO-ENTMCNC: 28.9 G/DL (ref 31.5–35.7)
MCV RBC AUTO: 84.3 FL (ref 79–97)
MONOCYTES # BLD AUTO: 0.5 10*3/MM3 (ref 0.1–0.9)
MONOCYTES NFR BLD AUTO: 12.2 % (ref 5–12)
NEUTROPHILS # BLD AUTO: 2.32 10*3/MM3 (ref 1.7–7)
NEUTROPHILS NFR BLD AUTO: 56.7 % (ref 42.7–76)
NRBC BLD AUTO-RTO: 0 /100 WBC (ref 0–0.2)
PLATELET # BLD AUTO: 215 10*3/MM3 (ref 140–450)
PMV BLD AUTO: 11.8 FL (ref 6–12)
RBC # BLD AUTO: 3.57 10*6/MM3 (ref 3.77–5.28)
TIBC SERPL-MCNC: 395 MCG/DL (ref 298–536)
TRANSFERRIN SERPL-MCNC: 265 MG/DL (ref 200–360)
TSH SERPL DL<=0.05 MIU/L-ACNC: 1.45 MIU/ML (ref 0.27–4.2)
VIT B12 BLD-MCNC: 424 PG/ML (ref 211–946)
WBC NRBC COR # BLD: 4.1 10*3/MM3 (ref 3.4–10.8)

## 2019-06-18 PROCEDURE — 85025 COMPLETE CBC W/AUTO DIFF WBC: CPT | Performed by: NURSE PRACTITIONER

## 2019-06-18 PROCEDURE — 99214 OFFICE O/P EST MOD 30 MIN: CPT | Performed by: NURSE PRACTITIONER

## 2019-06-18 PROCEDURE — 84466 ASSAY OF TRANSFERRIN: CPT | Performed by: NURSE PRACTITIONER

## 2019-06-18 PROCEDURE — 84443 ASSAY THYROID STIM HORMONE: CPT | Performed by: NURSE PRACTITIONER

## 2019-06-18 PROCEDURE — 83540 ASSAY OF IRON: CPT | Performed by: NURSE PRACTITIONER

## 2019-06-18 PROCEDURE — 82306 VITAMIN D 25 HYDROXY: CPT | Performed by: NURSE PRACTITIONER

## 2019-06-18 PROCEDURE — 82607 VITAMIN B-12: CPT | Performed by: NURSE PRACTITIONER

## 2019-06-18 NOTE — PROGRESS NOTES
Subjective   Marta Chambers is a 38 y.o. female.     Here today for kathleen.  Has been treated for discoid lupus.  Is tired and and hair falling out.  Also has been told by her rheumatologist that she is anemic.  She is taking a multi vitamin otc.    Her htn is treated by her nephrologist.    Hypertension   This is a chronic problem. The current episode started more than 1 year ago. The problem is controlled. Pertinent negatives include no anxiety, blurred vision, chest pain, headaches, malaise/fatigue, neck pain, orthopnea, palpitations, peripheral edema, PND, shortness of breath or sweats. There are no associated agents to hypertension. Risk factors for coronary artery disease include obesity and sedentary lifestyle. Past treatments include angiotensin blockers and beta blockers. Current antihypertension treatment includes angiotensin blockers and beta blockers. The current treatment provides significant improvement. There are no compliance problems.  Hypertensive end-organ damage includes kidney disease. There is no history of angina, CVA, heart failure, left ventricular hypertrophy, PVD or retinopathy. Identifiable causes of hypertension include chronic renal disease.   Fatigue   This is a new problem. The current episode started more than 1 month ago. The problem occurs constantly. The problem has been unchanged. Associated symptoms include fatigue. Pertinent negatives include no abdominal pain, anorexia, arthralgias, change in bowel habit, chest pain, chills, congestion, coughing, diaphoresis, fever, headaches, joint swelling, myalgias, nausea, neck pain, numbness, rash, sore throat, swollen glands, urinary symptoms, vertigo, visual change, vomiting or weakness. The symptoms are aggravated by stress. She has tried nothing for the symptoms. The treatment provided no relief.        The following portions of the patient's history were reviewed and updated as appropriate: allergies, current medications, past  family history, past medical history, past social history, past surgical history and problem list.    Review of Systems   Constitutional: Positive for fatigue. Negative for chills, diaphoresis, fever and malaise/fatigue.   HENT: Negative.  Negative for congestion and sore throat.    Eyes: Negative.  Negative for blurred vision.   Respiratory: Negative.  Negative for cough and shortness of breath.    Cardiovascular: Negative.  Negative for chest pain, palpitations, orthopnea and PND.   Gastrointestinal: Negative.  Negative for abdominal pain, anorexia, change in bowel habit, nausea and vomiting.   Endocrine: Negative.    Genitourinary: Negative.    Musculoskeletal: Negative.  Negative for arthralgias, joint swelling, myalgias and neck pain.   Skin: Negative.  Negative for rash.   Allergic/Immunologic: Negative.    Neurological: Negative.  Negative for vertigo, weakness and numbness.   Hematological: Negative.    Psychiatric/Behavioral: Negative.        Objective   Physical Exam   Constitutional: She is oriented to person, place, and time. She appears well-developed and well-nourished. No distress.   HENT:   Head: Normocephalic and atraumatic.   Mouth/Throat: No oropharyngeal exudate.   Eyes: Pupils are equal, round, and reactive to light.   Neck: Normal range of motion. Neck supple. No thyromegaly present.   Cardiovascular: Normal rate, regular rhythm and normal heart sounds. Exam reveals no friction rub.   No murmur heard.  Pulmonary/Chest: Effort normal and breath sounds normal. No respiratory distress. She has no wheezes. She has no rales.   Abdominal: Soft.   Musculoskeletal: Normal range of motion.   Neurological: She is alert and oriented to person, place, and time.   Skin: Skin is warm and dry.   Psychiatric: She has a normal mood and affect. Thought content normal.   Nursing note and vitals reviewed.        Assessment/Plan   Marta was seen today for hypertension.    Diagnoses and all orders for this  visit:    Other fatigue  -     TSH  -     Vitamin B12  -     Vitamin D 25 hydroxy    Essential hypertension  Comments:  nephrologist is controlling her b/p.  no changes in meds.    Iron deficiency anemia, unspecified iron deficiency anemia type  -     CBC & Differential  -     Iron and TIBC  -     CBC Auto Differential    Chronic kidney disease, unspecified CKD stage

## 2019-06-20 NOTE — PROGRESS NOTES
We need for her to take vit d weekly and her iron needs to be twice daily for anemia.  Jared in 6 weeks.

## 2019-06-20 NOTE — PATIENT INSTRUCTIONS
Calorie Counting for Weight Loss  Calories are units of energy. Your body needs a certain amount of calories from food to keep you going throughout the day. When you eat more calories than your body needs, your body stores the extra calories as fat. When you eat fewer calories than your body needs, your body burns fat to get the energy it needs.  Calorie counting means keeping track of how many calories you eat and drink each day. Calorie counting can be helpful if you need to lose weight. If you make sure to eat fewer calories than your body needs, you should lose weight. Ask your health care provider what a healthy weight is for you.  For calorie counting to work, you will need to eat the right number of calories in a day in order to lose a healthy amount of weight per week. A dietitian can help you determine how many calories you need in a day and will give you suggestions on how to reach your calorie goal.  · A healthy amount of weight to lose per week is usually 1-2 lb (0.5-0.9 kg). This usually means that your daily calorie intake should be reduced by 500-750 calories.  · Eating 1,200 - 1,500 calories per day can help most women lose weight.  · Eating 1,500 - 1,800 calories per day can help most men lose weight.    What is my plan?  My goal is to have __________ calories per day.  If I have this many calories per day, I should lose around __________ pounds per week.  What do I need to know about calorie counting?  In order to meet your daily calorie goal, you will need to:  · Find out how many calories are in each food you would like to eat. Try to do this before you eat.  · Decide how much of the food you plan to eat.  · Write down what you ate and how many calories it had. Doing this is called keeping a food log.    To successfully lose weight, it is important to balance calorie counting with a healthy lifestyle that includes regular activity. Aim for 150 minutes of moderate exercise (such as walking) or 75  minutes of vigorous exercise (such as running) each week.  Where do I find calorie information?    The number of calories in a food can be found on a Nutrition Facts label. If a food does not have a Nutrition Facts label, try to look up the calories online or ask your dietitian for help.  Remember that calories are listed per serving. If you choose to have more than one serving of a food, you will have to multiply the calories per serving by the amount of servings you plan to eat. For example, the label on a package of bread might say that a serving size is 1 slice and that there are 90 calories in a serving. If you eat 1 slice, you will have eaten 90 calories. If you eat 2 slices, you will have eaten 180 calories.  How do I keep a food log?  Immediately after each meal, record the following information in your food log:  · What you ate. Don't forget to include toppings, sauces, and other extras on the food.  · How much you ate. This can be measured in cups, ounces, or number of items.  · How many calories each food and drink had.  · The total number of calories in the meal.    Keep your food log near you, such as in a small notebook in your pocket, or use a mobile melvin or website. Some programs will calculate calories for you and show you how many calories you have left for the day to meet your goal.  What are some calorie counting tips?  · Use your calories on foods and drinks that will fill you up and not leave you hungry:  ? Some examples of foods that fill you up are nuts and nut butters, vegetables, lean proteins, and high-fiber foods like whole grains. High-fiber foods are foods with more than 5 g fiber per serving.  ? Drinks such as sodas, specialty coffee drinks, alcohol, and juices have a lot of calories, yet do not fill you up.  · Eat nutritious foods and avoid empty calories. Empty calories are calories you get from foods or beverages that do not have many vitamins or protein, such as candy, sweets, and  "soda. It is better to have a nutritious high-calorie food (such as an avocado) than a food with few nutrients (such as a bag of chips).  · Know how many calories are in the foods you eat most often. This will help you calculate calorie counts faster.  · Pay attention to calories in drinks. Low-calorie drinks include water and unsweetened drinks.  · Pay attention to nutrition labels for \"low fat\" or \"fat free\" foods. These foods sometimes have the same amount of calories or more calories than the full fat versions. They also often have added sugar, starch, or salt, to make up for flavor that was removed with the fat.  · Find a way of tracking calories that works for you. Get creative. Try different apps or programs if writing down calories does not work for you.  What are some portion control tips?  · Know how many calories are in a serving. This will help you know how many servings of a certain food you can have.  · Use a measuring cup to measure serving sizes. You could also try weighing out portions on a kitchen scale. With time, you will be able to estimate serving sizes for some foods.  · Take some time to put servings of different foods on your favorite plates, bowls, and cups so you know what a serving looks like.  · Try not to eat straight from a bag or box. Doing this can lead to overeating. Put the amount you would like to eat in a cup or on a plate to make sure you are eating the right portion.  · Use smaller plates, glasses, and bowls to prevent overeating.  · Try not to multitask (for example, watch TV or use your computer) while eating. If it is time to eat, sit down at a table and enjoy your food. This will help you to know when you are full. It will also help you to be aware of what you are eating and how much you are eating.  What are tips for following this plan?  Reading food labels  · Check the calorie count compared to the serving size. The serving size may be smaller than what you are used to " eating.  · Check the source of the calories. Make sure the food you are eating is high in vitamins and protein and low in saturated and trans fats.  Shopping  · Read nutrition labels while you shop. This will help you make healthy decisions before you decide to purchase your food.  · Make a grocery list and stick to it.  Cooking  · Try to cook your favorite foods in a healthier way. For example, try baking instead of frying.  · Use low-fat dairy products.  Meal planning  · Use more fruits and vegetables. Half of your plate should be fruits and vegetables.  · Include lean proteins like poultry and fish.  How do I count calories when eating out?  · Ask for smaller portion sizes.  · Consider sharing an entree and sides instead of getting your own entree.  · If you get your own entree, eat only half. Ask for a box at the beginning of your meal and put the rest of your entree in it so you are not tempted to eat it.  · If calories are listed on the menu, choose the lower calorie options.  · Choose dishes that include vegetables, fruits, whole grains, low-fat dairy products, and lean protein.  · Choose items that are boiled, broiled, grilled, or steamed. Stay away from items that are buttered, battered, fried, or served with cream sauce. Items labeled “crispy” are usually fried, unless stated otherwise.  · Choose water, low-fat milk, unsweetened iced tea, or other drinks without added sugar. If you want an alcoholic beverage, choose a lower calorie option such as a glass of wine or light beer.  · Ask for dressings, sauces, and syrups on the side. These are usually high in calories, so you should limit the amount you eat.  · If you want a salad, choose a garden salad and ask for grilled meats. Avoid extra toppings like wynne, cheese, or fried items. Ask for the dressing on the side, or ask for olive oil and vinegar or lemon to use as dressing.  · Estimate how many servings of a food you are given. For example, a serving of  cooked rice is ½ cup or about the size of half a baseball. Knowing serving sizes will help you be aware of how much food you are eating at restaurants. The list below tells you how big or small some common portion sizes are based on everyday objects:  ? 1 oz--4 stacked dice.  ? 3 oz--1 deck of cards.  ? 1 tsp--1 die.  ? 1 Tbsp--½ a ping-pong ball.  ? 2 Tbsp--1 ping-pong ball.  ? ½ cup--½ baseball.  ? 1 cup--1 baseball.  Summary  · Calorie counting means keeping track of how many calories you eat and drink each day. If you eat fewer calories than your body needs, you should lose weight.  · A healthy amount of weight to lose per week is usually 1-2 lb (0.5-0.9 kg). This usually means reducing your daily calorie intake by 500-750 calories.  · The number of calories in a food can be found on a Nutrition Facts label. If a food does not have a Nutrition Facts label, try to look up the calories online or ask your dietitian for help.  · Use your calories on foods and drinks that will fill you up, and not on foods and drinks that will leave you hungry.  · Use smaller plates, glasses, and bowls to prevent overeating.  This information is not intended to replace advice given to you by your health care provider. Make sure you discuss any questions you have with your health care provider.  Document Released: 12/18/2006 Document Revised: 11/17/2017 Document Reviewed: 11/17/2017  [email or web address] Interactive Patient Education © 2019 [email or web address]      Exercising to Lose Weight  Exercising can help you to lose weight. In order to lose weight through exercise, you need to do vigorous-intensity exercise. You can tell that you are exercising with vigorous intensity if you are breathing very hard and fast and cannot hold a conversation while exercising.  Moderate-intensity exercise helps to maintain your current weight. You can tell that you are exercising at a moderate level if you have a higher heart rate and faster breathing, but you are  still able to hold a conversation.  How often should I exercise?  Choose an activity that you enjoy and set realistic goals. Your health care provider can help you to make an activity plan that works for you. Exercise regularly as directed by your health care provider. This may include:  · Doing resistance training twice each week, such as:  ? Push-ups.  ? Sit-ups.  ? Lifting weights.  ? Using resistance bands.  · Doing a given intensity of exercise for a given amount of time. Choose from these options:  ? 150 minutes of moderate-intensity exercise every week.  ? 75 minutes of vigorous-intensity exercise every week.  ? A mix of moderate-intensity and vigorous-intensity exercise every week.    Children, pregnant women, people who are out of shape, people who are overweight, and older adults may need to consult a health care provider for individual recommendations. If you have any sort of medical condition, be sure to consult your health care provider before starting a new exercise program.  What are some activities that can help me to lose weight?  · Walking at a rate of at least 4.5 miles an hour.  · Jogging or running at a rate of 5 miles per hour.  · Biking at a rate of at least 10 miles per hour.  · Lap swimming.  · Roller-skating or in-line skating.  · Cross-country skiing.  · Vigorous competitive sports, such as football, basketball, and soccer.  · Jumping rope.  · Aerobic dancing.  How can I be more active in my day-to-day activities?  · Use the stairs instead of the elevator.  · Take a walk during your lunch break.  · If you drive, park your car farther away from work or school.  · If you take public transportation, get off one stop early and walk the rest of the way.  · Make all of your phone calls while standing up and walking around.  · Get up, stretch, and walk around every 30 minutes throughout the day.  What guidelines should I follow while exercising?  · Do not exercise so much that you hurt yourself,  feel dizzy, or get very short of breath.  · Consult your health care provider prior to starting a new exercise program.  · Wear comfortable clothes and shoes with good support.  · Drink plenty of water while you exercise to prevent dehydration or heat stroke. Body water is lost during exercise and must be replaced.  · Work out until you breathe faster and your heart beats faster.  This information is not intended to replace advice given to you by your health care provider. Make sure you discuss any questions you have with your health care provider.  Document Released: 01/20/2012 Document Revised: 05/25/2017 Document Reviewed: 05/21/2015  [employer] Interactive Patient Education © 2019 [employer]

## 2019-06-21 ENCOUNTER — TELEPHONE (OUTPATIENT)
Dept: FAMILY MEDICINE CLINIC | Facility: CLINIC | Age: 39
End: 2019-06-21

## 2019-07-09 RX ORDER — FERROUS SULFATE 325(65) MG
325 TABLET ORAL 2 TIMES DAILY
Qty: 60 TABLET | Refills: 3 | Status: SHIPPED | OUTPATIENT
Start: 2019-07-09 | End: 2019-12-19 | Stop reason: SDUPTHER

## 2019-07-09 RX ORDER — ERGOCALCIFEROL 1.25 MG/1
50000 CAPSULE ORAL WEEKLY
Qty: 5 CAPSULE | Refills: 3 | Status: SHIPPED | OUTPATIENT
Start: 2019-07-09 | End: 2019-10-10 | Stop reason: SDUPTHER

## 2019-07-09 NOTE — TELEPHONE ENCOUNTER
Patient aware of results and needs to take the iron bid also advised since she said it hurts her stomach and constipates her to take a stool softener along with the iron

## 2019-08-06 ENCOUNTER — OFFICE VISIT (OUTPATIENT)
Dept: CARDIOLOGY | Facility: CLINIC | Age: 39
End: 2019-08-06

## 2019-08-06 VITALS
BODY MASS INDEX: 35.08 KG/M2 | SYSTOLIC BLOOD PRESSURE: 160 MMHG | HEART RATE: 69 BPM | DIASTOLIC BLOOD PRESSURE: 92 MMHG | HEIGHT: 72 IN | WEIGHT: 259 LBS | OXYGEN SATURATION: 98 %

## 2019-08-06 DIAGNOSIS — Z98.890 H/O AORTIC ARCH REPAIR: ICD-10-CM

## 2019-08-06 DIAGNOSIS — I71.019 AORTIC DISSECTION, THORACIC (HCC): Primary | ICD-10-CM

## 2019-08-06 DIAGNOSIS — I10 ESSENTIAL HYPERTENSION: ICD-10-CM

## 2019-08-06 DIAGNOSIS — Z98.890 HISTORY OF AORTIC ROOT REPAIR: ICD-10-CM

## 2019-08-06 PROCEDURE — 99214 OFFICE O/P EST MOD 30 MIN: CPT | Performed by: INTERNAL MEDICINE

## 2019-08-06 RX ORDER — AMLODIPINE BESYLATE 5 MG/1
5 TABLET ORAL DAILY
Qty: 30 TABLET | Refills: 6 | Status: SHIPPED | OUTPATIENT
Start: 2019-08-06 | End: 2020-03-13

## 2019-08-06 RX ORDER — HYDROXYCHLOROQUINE SULFATE 200 MG/1
200 TABLET, FILM COATED ORAL DAILY
COMMUNITY
End: 2020-07-06

## 2019-08-06 NOTE — PROGRESS NOTES
Marta Chambers  38 y.o. female    08/06/2019  1. Aortic dissection, thoracic (CMS/HCC)    2. Essential hypertension    3. H/O aortic arch repair    4. History of aortic root repair        History of Present Illness   Ms. Marta Chambers is a 38 y.o. female with a history of obesity and hypertension who presented with Nicholas type A dissection in October 2014 and underwent repair by  in [place], [place].  PROCEDURES PERFORMED:   1. Urgent repair of a type A aortic dissection using a #36 Vascutek  interposition graft to reconstruct the aortic arch as a hemiarch  reconstruction, and a total replacement of the ascending aorta.   2. Resuspension and reconstruction of the aortic root and valve using BioGlue.    She has CKD and is being monitored closely.  Her blood pressure has been elevated lately and was 160/90 mmHg today.  Is been compliant with her medications.  Clinical exam otherwise was unremarkable with no signs of congestive heart failure.    SUBJECTIVE    No Known Allergies      Past Medical History:   Diagnosis Date   • Acute renal failure (CMS/HCC)    • Anemia    • Aneurysm, aortic (CMS/HCC)    • Aortic dissection (CMS/HCC)    • Chest pain    • GERD (gastroesophageal reflux disease)    • Hemorrhoids    • Hypertension    • Obesity          Past Surgical History:   Procedure Laterality Date   • ASCENDING ARCH/HEMIARCH REPLACEMENT     • CARDIAC VALVE SURGERY     • HAND SURGERY     • OTHER SURGICAL HISTORY      aortic dissection repair   • TUBAL ABDOMINAL LIGATION           Family History   Problem Relation Age of Onset   • Sudden death Father         cardiac   • Other Father         ruptured aortic aneurysm   • Diabetes Other    • Hypertension Other          Social History     Socioeconomic History   • Marital status: Single     Spouse name: Not on file   • Number of children: Not on file   • Years of education: Not on file   • Highest education level: Not on file   Tobacco Use   • Smoking status: Former  "Smoker   • Smokeless tobacco: Never Used   Substance and Sexual Activity   • Alcohol use: Yes     Comment: social   • Drug use: No   • Sexual activity: Defer         Current Outpatient Medications   Medication Sig Dispense Refill   • aspirin 81 MG chewable tablet Chew 81 mg Daily.     • ferrous sulfate 325 (65 FE) MG tablet Take 1 tablet by mouth 2 (Two) Times a Day. 60 tablet 3   • hydroxychloroquine (PLAQUENIL) 200 MG tablet Take 200 mg by mouth Daily.     • losartan (COZAAR) 25 MG tablet Take 25 mg by mouth Daily.     • losartan (COZAAR) 50 MG tablet Take 50 mg by mouth Daily.     • metoprolol tartrate (LOPRESSOR) 50 MG tablet TAKE ONE TABLET BY MOUTH EVERY 12 HOURS 60 tablet 4   • Multiple Vitamins-Minerals (MULTIVITAMIN ADULT PO) Take 1 tablet by mouth Daily.     • vitamin D (ERGOCALCIFEROL) 25189 units capsule capsule Take 1 capsule by mouth 1 (One) Time Per Week. 5 capsule 3   • amLODIPine (NORVASC) 5 MG tablet Take 1 tablet by mouth Daily. Take in PM 30 tablet 6   • Biotin 5000 MCG capsule Take 2 tablets by mouth Daily.       No current facility-administered medications for this visit.          OBJECTIVE    /92   Pulse 69   Ht 182.9 cm (72.01\")   Wt 117 kg (259 lb)   SpO2 98%   BMI 35.12 kg/m²         Review of Systems     Constitutional:  Denies recent weight loss, weight gain, fever or chills, no change in exercise tolerance     HENT:  Denies any hearing loss, epistaxis, hoarseness, or difficulty speaking.     Eyes: Wears eyeglasses or contact lenses     Respiratory:  Denies dyspnea with exertion,no cough, wheezing, or hemoptysis.     Cardiovascular: Negative for palpations, chest pain    Gastrointestinal:  Denies change in bowel habits, dyspepsia, ulcer disease, hematochezia, or melena.     Endocrine: Negative for cold intolerance, heat intolerance, polydipsia, polyphagia and polyuria.     Genitourinary: CKD      Musculoskeletal: Denies any history of arthritic symptoms or back problems "     Skin:  Denies any change in hair or nails, rashes, or skin lesions.     Allergic/Immunologic: Negative.  Negative for environmental allergies, food allergies and immunocompromised state.     Neurological:  Denies any history of recurrent headaches, strokes, TIA, or seizure disorder.     Hematological: Denies any food allergies, seasonal allergies, bleeding disorders, or lymphadenopathy.     Psychiatric/Behavioral: Denies any history of depression, substance abuse, or change in cognitive function.         Physical Exam     Constitutional: Cooperative, alert and oriented,  in no acute distress.     HENT:   Head: Normocephalic, normal hair patterns, no masses or tenderness.  Ears, Nose, and Throat: No gross abnormalities. No pallor or cyanosis. Dentition good.   Eyes: EOMS intact, PERRL, conjunctivae and lids unremarkable. Fundoscopic exam and visual fields not performed.   Neck: No palpable masses or adenopathy, no thyromegaly, no JVD, carotid pulses are full and equal bilaterally and without  Bruits.     Cardiovascular: Regular rhythm, S1 and S2 normal, no S3 or S4. No murmurs, gallops, or rubs detected.     Pulmonary/Chest: Chest: normal symmetry,  normal respiratory excursion, no intercostal retraction, no use of accessory muscles.            Pulmonary: Normal breath sounds. No rales or ronchi.    Abdominal: Abdomen soft, bowel sounds normoactive, no masses, no hepatosplenomegaly, non-tender, no bruits.     Musculoskeletal: No deformities, clubbing, cyanosis, erythema, or edema observed.     Neurological: No gross motor or sensory deficits noted, affect appropriate, oriented to time, person, place.     Skin: Warm and dry to the touch, no apparent skin lesions or masses noted.     Psychiatric: She has a normal mood and affect. Her behavior is normal. Judgment and thought content normal.         Procedures      Lab Results   Component Value Date    WBC 4.10 06/18/2019    HGB 8.7 (L) 06/18/2019    HCT 30.1 (L)  06/18/2019    MCV 84.3 06/18/2019     06/18/2019     Lab Results   Component Value Date    GLUCOSE 82 06/29/2018    BUN 20 06/29/2018    CREATININE 1.64 (H) 06/29/2018    EGFRIFAFRI 42 (L) 06/29/2018    BCR 12.2 06/29/2018    CO2 19.0 (L) 06/29/2018    CALCIUM 9.0 06/29/2018    ALBUMIN 3.90 06/29/2018    AST 21 06/29/2018    ALT 21 06/29/2018     Lab Results   Component Value Date    CHOL 157 06/29/2018    CHOL 159 07/06/2017     Lab Results   Component Value Date    TRIG 84 06/29/2018    TRIG 163 07/06/2017    TRIG 77 10/22/2014     Lab Results   Component Value Date    HDL 48 (L) 06/29/2018    HDL 43 (L) 07/06/2017    HDL 44 10/22/2014     No components found for: LDLCALC  Lab Results   Component Value Date    LDL 80 06/29/2018    LDL 83 07/06/2017    LDL 84 10/22/2014     No results found for: HDLLDLRATIO  No components found for: CHOLHDL  Lab Results   Component Value Date    HGBA1C 5.3 10/23/2014     Lab Results   Component Value Date    TSH 1.450 06/18/2019           ASSESSMENT AND PLAN  Ms. Chambers is stable with regards to her heart with no evidence of cardiac symptoms at the present time.  Her blood pressure is not under control and I have stressed the importance of optimization.  I have started her on amlodipine 5 mg in p.m. in addition to losartan 75 mg in a.m. and metoprolol tartrate 50 mg twice a day has been continued.  She will continue follow-up with nephrology on a regular basis.  She is due to have lab work done soon.  A copy of the results will be obtained.    Marta was seen today for follow-up.    Diagnoses and all orders for this visit:    Aortic dissection, thoracic (CMS/HCC)  -     Adult Transthoracic Echo Complete W/ Cont if Necessary Per Protocol; Future    Essential hypertension  -     Adult Transthoracic Echo Complete W/ Cont if Necessary Per Protocol; Future    H/O aortic arch repair  -     Adult Transthoracic Echo Complete W/ Cont if Necessary Per Protocol; Future    History of aortic  root repair  -     Adult Transthoracic Echo Complete W/ Cont if Necessary Per Protocol; Future    Other orders  -     amLODIPine (NORVASC) 5 MG tablet; Take 1 tablet by mouth Daily. Take in PM        Patient's Body mass index is 35.12 kg/m². BMI is above normal parameters. Recommendations include: exercise counseling and nutrition counseling.  Patient is a non-smoker.    Katerina Solis MD  8/6/2019  3:48 PM

## 2019-08-20 RX ORDER — METOPROLOL TARTRATE 50 MG/1
TABLET, FILM COATED ORAL
Qty: 60 TABLET | Refills: 3 | Status: SHIPPED | OUTPATIENT
Start: 2019-08-20 | End: 2019-12-23

## 2019-08-21 ENCOUNTER — TELEPHONE (OUTPATIENT)
Dept: CARDIOLOGY | Facility: CLINIC | Age: 39
End: 2019-08-21

## 2019-08-21 NOTE — TELEPHONE ENCOUNTER
Echo results discussed with patient. Patient made aware of BP monitoring and control. She is to let us know if at any time it begins to run high.

## 2019-10-10 RX ORDER — ERGOCALCIFEROL 1.25 MG/1
CAPSULE ORAL
Qty: 4 CAPSULE | Refills: 2 | Status: SHIPPED | OUTPATIENT
Start: 2019-10-10 | End: 2019-12-19 | Stop reason: SDUPTHER

## 2019-12-19 ENCOUNTER — OFFICE VISIT (OUTPATIENT)
Dept: FAMILY MEDICINE CLINIC | Facility: CLINIC | Age: 39
End: 2019-12-19

## 2019-12-19 ENCOUNTER — APPOINTMENT (OUTPATIENT)
Dept: LAB | Facility: HOSPITAL | Age: 39
End: 2019-12-19

## 2019-12-19 VITALS
RESPIRATION RATE: 18 BRPM | OXYGEN SATURATION: 98 % | HEART RATE: 62 BPM | SYSTOLIC BLOOD PRESSURE: 146 MMHG | WEIGHT: 261 LBS | TEMPERATURE: 97.9 F | BODY MASS INDEX: 35.35 KG/M2 | DIASTOLIC BLOOD PRESSURE: 88 MMHG | HEIGHT: 72 IN

## 2019-12-19 DIAGNOSIS — K64.9 HEMORRHOIDS, UNSPECIFIED HEMORRHOID TYPE: ICD-10-CM

## 2019-12-19 DIAGNOSIS — I10 ESSENTIAL HYPERTENSION: Primary | ICD-10-CM

## 2019-12-19 DIAGNOSIS — D50.9 IRON DEFICIENCY ANEMIA, UNSPECIFIED IRON DEFICIENCY ANEMIA TYPE: ICD-10-CM

## 2019-12-19 DIAGNOSIS — E66.9 OBESITY (BMI 35.0-39.9 WITHOUT COMORBIDITY): ICD-10-CM

## 2019-12-19 DIAGNOSIS — E55.9 VITAMIN D DEFICIENCY: ICD-10-CM

## 2019-12-19 LAB
25(OH)D3 SERPL-MCNC: 40.8 NG/ML (ref 30–100)
ALBUMIN SERPL-MCNC: 3.8 G/DL (ref 3.5–5.2)
ALBUMIN/GLOB SERPL: 1.1 G/DL
ALP SERPL-CCNC: 39 U/L (ref 39–117)
ALT SERPL W P-5'-P-CCNC: 18 U/L (ref 1–33)
ANION GAP SERPL CALCULATED.3IONS-SCNC: 11.2 MMOL/L (ref 5–15)
AST SERPL-CCNC: 18 U/L (ref 1–32)
BASOPHILS # BLD AUTO: 0.02 10*3/MM3 (ref 0–0.2)
BASOPHILS NFR BLD AUTO: 0.6 % (ref 0–1.5)
BILIRUB SERPL-MCNC: <0.2 MG/DL (ref 0.2–1.2)
BUN BLD-MCNC: 18 MG/DL (ref 6–20)
BUN/CREAT SERPL: 10.4 (ref 7–25)
CALCIUM SPEC-SCNC: 8.9 MG/DL (ref 8.6–10.5)
CHLORIDE SERPL-SCNC: 109 MMOL/L (ref 98–107)
CO2 SERPL-SCNC: 20.8 MMOL/L (ref 22–29)
CREAT BLD-MCNC: 1.73 MG/DL (ref 0.57–1)
DEPRECATED RDW RBC AUTO: 48.1 FL (ref 37–54)
EOSINOPHIL # BLD AUTO: 0.12 10*3/MM3 (ref 0–0.4)
EOSINOPHIL NFR BLD AUTO: 3.4 % (ref 0.3–6.2)
ERYTHROCYTE [DISTWIDTH] IN BLOOD BY AUTOMATED COUNT: 13.6 % (ref 12.3–15.4)
GFR SERPL CREATININE-BSD FRML MDRD: 39 ML/MIN/1.73
GLOBULIN UR ELPH-MCNC: 3.4 GM/DL
GLUCOSE BLD-MCNC: 70 MG/DL (ref 65–99)
HCT VFR BLD AUTO: 35.4 % (ref 34–46.6)
HGB BLD-MCNC: 11.5 G/DL (ref 12–15.9)
IMM GRANULOCYTES # BLD AUTO: 0.01 10*3/MM3 (ref 0–0.05)
IMM GRANULOCYTES NFR BLD AUTO: 0.3 % (ref 0–0.5)
IRON 24H UR-MRATE: 32 MCG/DL (ref 37–145)
IRON SATN MFR SERPL: 11 % (ref 20–50)
LYMPHOCYTES # BLD AUTO: 0.94 10*3/MM3 (ref 0.7–3.1)
LYMPHOCYTES NFR BLD AUTO: 26.6 % (ref 19.6–45.3)
MCH RBC QN AUTO: 31.3 PG (ref 26.6–33)
MCHC RBC AUTO-ENTMCNC: 32.5 G/DL (ref 31.5–35.7)
MCV RBC AUTO: 96.2 FL (ref 79–97)
MONOCYTES # BLD AUTO: 0.36 10*3/MM3 (ref 0.1–0.9)
MONOCYTES NFR BLD AUTO: 10.2 % (ref 5–12)
NEUTROPHILS # BLD AUTO: 2.09 10*3/MM3 (ref 1.7–7)
NEUTROPHILS NFR BLD AUTO: 58.9 % (ref 42.7–76)
NRBC BLD AUTO-RTO: 0 /100 WBC (ref 0–0.2)
PLATELET # BLD AUTO: 200 10*3/MM3 (ref 140–450)
PMV BLD AUTO: 11.5 FL (ref 6–12)
POTASSIUM BLD-SCNC: 4.1 MMOL/L (ref 3.5–5.2)
PROT SERPL-MCNC: 7.2 G/DL (ref 6–8.5)
RBC # BLD AUTO: 3.68 10*6/MM3 (ref 3.77–5.28)
SODIUM BLD-SCNC: 141 MMOL/L (ref 136–145)
TIBC SERPL-MCNC: 295 MCG/DL (ref 298–536)
TRANSFERRIN SERPL-MCNC: 198 MG/DL (ref 200–360)
WBC NRBC COR # BLD: 3.54 10*3/MM3 (ref 3.4–10.8)

## 2019-12-19 PROCEDURE — 83540 ASSAY OF IRON: CPT | Performed by: NURSE PRACTITIONER

## 2019-12-19 PROCEDURE — 80053 COMPREHEN METABOLIC PANEL: CPT | Performed by: NURSE PRACTITIONER

## 2019-12-19 PROCEDURE — 84466 ASSAY OF TRANSFERRIN: CPT | Performed by: NURSE PRACTITIONER

## 2019-12-19 PROCEDURE — 99214 OFFICE O/P EST MOD 30 MIN: CPT | Performed by: NURSE PRACTITIONER

## 2019-12-19 PROCEDURE — 82306 VITAMIN D 25 HYDROXY: CPT | Performed by: NURSE PRACTITIONER

## 2019-12-19 PROCEDURE — 85025 COMPLETE CBC W/AUTO DIFF WBC: CPT | Performed by: NURSE PRACTITIONER

## 2019-12-19 RX ORDER — FERROUS SULFATE 325(65) MG
325 TABLET ORAL 2 TIMES DAILY
Qty: 60 TABLET | Refills: 3 | Status: SHIPPED | OUTPATIENT
Start: 2019-12-19 | End: 2020-06-09

## 2019-12-19 RX ORDER — ERGOCALCIFEROL 1.25 MG/1
50000 CAPSULE ORAL WEEKLY
Qty: 4 CAPSULE | Refills: 5 | Status: SHIPPED | OUTPATIENT
Start: 2019-12-19 | End: 2020-05-28

## 2019-12-19 RX ORDER — HYDROCORTISONE ACETATE 25 MG/1
25 SUPPOSITORY RECTAL 2 TIMES DAILY PRN
Qty: 24 SUPPOSITORY | Refills: 3 | Status: SHIPPED | OUTPATIENT
Start: 2019-12-19 | End: 2021-03-15 | Stop reason: SDUPTHER

## 2019-12-23 RX ORDER — METOPROLOL TARTRATE 50 MG/1
50 TABLET, FILM COATED ORAL EVERY 12 HOURS
Qty: 60 TABLET | Refills: 3 | Status: SHIPPED | OUTPATIENT
Start: 2019-12-23 | End: 2020-02-20 | Stop reason: SDUPTHER

## 2019-12-23 RX ORDER — METOPROLOL TARTRATE 50 MG/1
TABLET, FILM COATED ORAL
Qty: 60 TABLET | Refills: 2 | Status: SHIPPED | OUTPATIENT
Start: 2019-12-23 | End: 2020-02-20 | Stop reason: SDUPTHER

## 2019-12-26 NOTE — PROGRESS NOTES
Subjective   Marta Chambers is a 39 y.o. female.     Here today for kathleen on her b/p.  She has a hx of renal insuff and she sees a nephrologist regularly.  She feels well and has no complaints today.    Hypertension   This is a chronic problem. The current episode started more than 1 year ago. The problem is unchanged. The problem is controlled. Pertinent negatives include no anxiety, blurred vision, chest pain, headaches, malaise/fatigue, neck pain, orthopnea, palpitations, peripheral edema, PND, shortness of breath or sweats. There are no associated agents to hypertension. Risk factors for coronary artery disease include sedentary lifestyle. Past treatments include calcium channel blockers and angiotensin blockers. Current antihypertension treatment includes calcium channel blockers and angiotensin blockers. The current treatment provides significant improvement. There are no compliance problems.  There is no history of angina, kidney disease, CAD/MI, CVA, heart failure, left ventricular hypertrophy, PVD or retinopathy.   Anemia   Presents for follow-up visit. There has been no abdominal pain, anorexia, bruising/bleeding easily, confusion, fever, leg swelling, light-headedness, malaise/fatigue, pallor, palpitations, paresthesias, pica or weight loss. (Anemia of chronic disease)   There is no history of heart failure. Compliance with medications is %.        The following portions of the patient's history were reviewed and updated as appropriate: allergies, current medications, past family history, past medical history, past social history, past surgical history and problem list.    Review of Systems   Constitutional: Negative.  Negative for fever, malaise/fatigue and unexpected weight loss.   HENT: Negative.    Eyes: Negative.  Negative for blurred vision.   Respiratory: Negative.  Negative for shortness of breath.    Cardiovascular: Negative.  Negative for chest pain, palpitations, orthopnea and PND.    Gastrointestinal: Negative.  Negative for abdominal pain and anorexia.   Endocrine: Negative.    Genitourinary: Negative.    Musculoskeletal: Negative.  Negative for neck pain.   Skin: Negative.  Negative for pallor.   Allergic/Immunologic: Negative.    Neurological: Negative for light-headedness, paresthesias and confusion.   Hematological: Negative.  Does not bruise/bleed easily.       Objective   Physical Exam   Constitutional: She is oriented to person, place, and time. She appears well-developed and well-nourished. No distress.   HENT:   Head: Normocephalic and atraumatic.   Mouth/Throat: No oropharyngeal exudate.   Eyes: Pupils are equal, round, and reactive to light.   Neck: Normal range of motion. Neck supple. No thyromegaly present.   Cardiovascular: Normal rate, regular rhythm and normal heart sounds. Exam reveals no friction rub.   No murmur heard.  Pulmonary/Chest: Effort normal and breath sounds normal. No respiratory distress. She has no wheezes. She has no rales.   Abdominal: Soft.   Musculoskeletal: Normal range of motion.   Neurological: She is alert and oriented to person, place, and time.   Skin: Skin is warm and dry.   Psychiatric: She has a normal mood and affect. Thought content normal.   Nursing note and vitals reviewed.        Assessment/Plan   Marta was seen today for hypertension and anemia.    Diagnoses and all orders for this visit:    Essential hypertension  -     Comprehensive metabolic panel    Vitamin D deficiency  -     vitamin D (ERGOCALCIFEROL) 1.25 MG (46708 UT) capsule capsule; Take 1 capsule by mouth 1 (One) Time Per Week.  -     Vitamin D 25 Hydroxy    Iron deficiency anemia, unspecified iron deficiency anemia type  -     ferrous sulfate 325 (65 FE) MG tablet; Take 1 tablet by mouth 2 (Two) Times a Day.  -     CBC & Differential  -     Iron and TIBC  -     CBC Auto Differential    Hemorrhoids, unspecified hemorrhoid type  -     hydrocortisone (ANUSOL-HC) 25 MG suppository;  Insert 1 suppository into the rectum 2 (Two) Times a Day As Needed for Hemorrhoids.    Obesity (BMI 35.0-39.9 without comorbidity)  Comments:  diet and exercise info given

## 2019-12-26 NOTE — PATIENT INSTRUCTIONS
Calorie Counting for Weight Loss  Calories are units of energy. Your body needs a certain amount of calories from food to keep you going throughout the day. When you eat more calories than your body needs, your body stores the extra calories as fat. When you eat fewer calories than your body needs, your body burns fat to get the energy it needs.  Calorie counting means keeping track of how many calories you eat and drink each day. Calorie counting can be helpful if you need to lose weight. If you make sure to eat fewer calories than your body needs, you should lose weight. Ask your health care provider what a healthy weight is for you.  For calorie counting to work, you will need to eat the right number of calories in a day in order to lose a healthy amount of weight per week. A dietitian can help you determine how many calories you need in a day and will give you suggestions on how to reach your calorie goal.  · A healthy amount of weight to lose per week is usually 1-2 lb (0.5-0.9 kg). This usually means that your daily calorie intake should be reduced by 500-750 calories.  · Eating 1,200 - 1,500 calories per day can help most women lose weight.  · Eating 1,500 - 1,800 calories per day can help most men lose weight.  What is my plan?  My goal is to have __________ calories per day.  If I have this many calories per day, I should lose around __________ pounds per week.  What do I need to know about calorie counting?  In order to meet your daily calorie goal, you will need to:  · Find out how many calories are in each food you would like to eat. Try to do this before you eat.  · Decide how much of the food you plan to eat.  · Write down what you ate and how many calories it had. Doing this is called keeping a food log.  To successfully lose weight, it is important to balance calorie counting with a healthy lifestyle that includes regular activity. Aim for 150 minutes of moderate exercise (such as walking) or 75  minutes of vigorous exercise (such as running) each week.  Where do I find calorie information?    The number of calories in a food can be found on a Nutrition Facts label. If a food does not have a Nutrition Facts label, try to look up the calories online or ask your dietitian for help.  Remember that calories are listed per serving. If you choose to have more than one serving of a food, you will have to multiply the calories per serving by the amount of servings you plan to eat. For example, the label on a package of bread might say that a serving size is 1 slice and that there are 90 calories in a serving. If you eat 1 slice, you will have eaten 90 calories. If you eat 2 slices, you will have eaten 180 calories.  How do I keep a food log?  Immediately after each meal, record the following information in your food log:  · What you ate. Don't forget to include toppings, sauces, and other extras on the food.  · How much you ate. This can be measured in cups, ounces, or number of items.  · How many calories each food and drink had.  · The total number of calories in the meal.  Keep your food log near you, such as in a small notebook in your pocket, or use a mobile melvin or website. Some programs will calculate calories for you and show you how many calories you have left for the day to meet your goal.  What are some calorie counting tips?    · Use your calories on foods and drinks that will fill you up and not leave you hungry:  ? Some examples of foods that fill you up are nuts and nut butters, vegetables, lean proteins, and high-fiber foods like whole grains. High-fiber foods are foods with more than 5 g fiber per serving.  ? Drinks such as sodas, specialty coffee drinks, alcohol, and juices have a lot of calories, yet do not fill you up.  · Eat nutritious foods and avoid empty calories. Empty calories are calories you get from foods or beverages that do not have many vitamins or protein, such as candy, sweets, and  "soda. It is better to have a nutritious high-calorie food (such as an avocado) than a food with few nutrients (such as a bag of chips).  · Know how many calories are in the foods you eat most often. This will help you calculate calorie counts faster.  · Pay attention to calories in drinks. Low-calorie drinks include water and unsweetened drinks.  · Pay attention to nutrition labels for \"low fat\" or \"fat free\" foods. These foods sometimes have the same amount of calories or more calories than the full fat versions. They also often have added sugar, starch, or salt, to make up for flavor that was removed with the fat.  · Find a way of tracking calories that works for you. Get creative. Try different apps or programs if writing down calories does not work for you.  What are some portion control tips?  · Know how many calories are in a serving. This will help you know how many servings of a certain food you can have.  · Use a measuring cup to measure serving sizes. You could also try weighing out portions on a kitchen scale. With time, you will be able to estimate serving sizes for some foods.  · Take some time to put servings of different foods on your favorite plates, bowls, and cups so you know what a serving looks like.  · Try not to eat straight from a bag or box. Doing this can lead to overeating. Put the amount you would like to eat in a cup or on a plate to make sure you are eating the right portion.  · Use smaller plates, glasses, and bowls to prevent overeating.  · Try not to multitask (for example, watch TV or use your computer) while eating. If it is time to eat, sit down at a table and enjoy your food. This will help you to know when you are full. It will also help you to be aware of what you are eating and how much you are eating.  What are tips for following this plan?  Reading food labels  · Check the calorie count compared to the serving size. The serving size may be smaller than what you are used to " "eating.  · Check the source of the calories. Make sure the food you are eating is high in vitamins and protein and low in saturated and trans fats.  Shopping  · Read nutrition labels while you shop. This will help you make healthy decisions before you decide to purchase your food.  · Make a grocery list and stick to it.  Cooking  · Try to cook your favorite foods in a healthier way. For example, try baking instead of frying.  · Use low-fat dairy products.  Meal planning  · Use more fruits and vegetables. Half of your plate should be fruits and vegetables.  · Include lean proteins like poultry and fish.  How do I count calories when eating out?  · Ask for smaller portion sizes.  · Consider sharing an entree and sides instead of getting your own entree.  · If you get your own entree, eat only half. Ask for a box at the beginning of your meal and put the rest of your entree in it so you are not tempted to eat it.  · If calories are listed on the menu, choose the lower calorie options.  · Choose dishes that include vegetables, fruits, whole grains, low-fat dairy products, and lean protein.  · Choose items that are boiled, broiled, grilled, or steamed. Stay away from items that are buttered, battered, fried, or served with cream sauce. Items labeled \"crispy\" are usually fried, unless stated otherwise.  · Choose water, low-fat milk, unsweetened iced tea, or other drinks without added sugar. If you want an alcoholic beverage, choose a lower calorie option such as a glass of wine or light beer.  · Ask for dressings, sauces, and syrups on the side. These are usually high in calories, so you should limit the amount you eat.  · If you want a salad, choose a garden salad and ask for grilled meats. Avoid extra toppings like wynne, cheese, or fried items. Ask for the dressing on the side, or ask for olive oil and vinegar or lemon to use as dressing.  · Estimate how many servings of a food you are given. For example, a serving of " cooked rice is ½ cup or about the size of half a baseball. Knowing serving sizes will help you be aware of how much food you are eating at restaurants. The list below tells you how big or small some common portion sizes are based on everyday objects:  ? 1 oz--4 stacked dice.  ? 3 oz--1 deck of cards.  ? 1 tsp--1 die.  ? 1 Tbsp--½ a ping-pong ball.  ? 2 Tbsp--1 ping-pong ball.  ? ½ cup--½ baseball.  ? 1 cup--1 baseball.  Summary  · Calorie counting means keeping track of how many calories you eat and drink each day. If you eat fewer calories than your body needs, you should lose weight.  · A healthy amount of weight to lose per week is usually 1-2 lb (0.5-0.9 kg). This usually means reducing your daily calorie intake by 500-750 calories.  · The number of calories in a food can be found on a Nutrition Facts label. If a food does not have a Nutrition Facts label, try to look up the calories online or ask your dietitian for help.  · Use your calories on foods and drinks that will fill you up, and not on foods and drinks that will leave you hungry.  · Use smaller plates, glasses, and bowls to prevent overeating.  This information is not intended to replace advice given to you by your health care provider. Make sure you discuss any questions you have with your health care provider.  Document Released: 12/18/2006 Document Revised: 09/06/2019 Document Reviewed: 11/17/2017  [employer] Interactive Patient Education © 2019 [employer]      Exercising to Lose Weight  Exercise is structured, repetitive physical activity to improve fitness and health. Getting regular exercise is important for everyone. It is especially important if you are overweight. Being overweight increases your risk of heart disease, stroke, diabetes, high blood pressure, and several types of cancer. Reducing your calorie intake and exercising can help you lose weight.  Exercise is usually categorized as moderate or vigorous intensity. To lose weight, most  people need to do a certain amount of moderate-intensity or vigorous-intensity exercise each week.  Moderate-intensity exercise    Moderate-intensity exercise is any activity that gets you moving enough to burn at least three times more energy (calories) than if you were sitting.  Examples of moderate exercise include:  · Walking a mile in 15 minutes.  · Doing light yard work.  · Biking at an easy pace.  Most people should get at least 150 minutes (2 hours and 30 minutes) a week of moderate-intensity exercise to maintain their body weight.  Vigorous-intensity exercise  Vigorous-intensity exercise is any activity that gets you moving enough to burn at least six times more calories than if you were sitting. When you exercise at this intensity, you should be working hard enough that you are not able to carry on a conversation.  Examples of vigorous exercise include:  · Running.  · Playing a team sport, such as football, basketball, and soccer.  · Jumping rope.  Most people should get at least 75 minutes (1 hour and 15 minutes) a week of vigorous-intensity exercise to maintain their body weight.  How can exercise affect me?  When you exercise enough to burn more calories than you eat, you lose weight. Exercise also reduces body fat and builds muscle. The more muscle you have, the more calories you burn. Exercise also:  · Improves mood.  · Reduces stress and tension.  · Improves your overall fitness, flexibility, and endurance.  · Increases bone strength.  The amount of exercise you need to lose weight depends on:  · Your age.  · The type of exercise.  · Any health conditions you have.  · Your overall physical ability.  Talk to your health care provider about how much exercise you need and what types of activities are safe for you.  What actions can I take to lose weight?  Nutrition    · Make changes to your diet as told by your health care provider or diet and nutrition specialist (dietitian). This may  include:  ? Eating fewer calories.  ? Eating more protein.  ? Eating less unhealthy fats.  ? Eating a diet that includes fresh fruits and vegetables, whole grains, low-fat dairy products, and lean protein.  ? Avoiding foods with added fat, salt, and sugar.  · Drink plenty of water while you exercise to prevent dehydration or heat stroke.  Activity  · Choose an activity that you enjoy and set realistic goals. Your health care provider can help you make an exercise plan that works for you.  · Exercise at a moderate or vigorous intensity most days of the week.  ? The intensity of exercise may vary from person to person. You can tell how intense a workout is for you by paying attention to your breathing and heartbeat. Most people will notice their breathing and heartbeat get faster with more intense exercise.  · Do resistance training twice each week, such as:  ? Push-ups.  ? Sit-ups.  ? Lifting weights.  ? Using resistance bands.  · Getting short amounts of exercise can be just as helpful as long structured periods of exercise. If you have trouble finding time to exercise, try to include exercise in your daily routine.  ? Get up, stretch, and walk around every 30 minutes throughout the day.  ? Go for a walk during your lunch break.  ? Park your car farther away from your destination.  ? If you take public transportation, get off one stop early and walk the rest of the way.  ? Make phone calls while standing up and walking around.  ? Take the stairs instead of elevators or escalators.  · Wear comfortable clothes and shoes with good support.  · Do not exercise so much that you hurt yourself, feel dizzy, or get very short of breath.  Where to find more information  · [place] Department of Health and Human Services: www.hhs.gov  · Centers for Disease Control and Prevention (CDC): www.cdc.gov  Contact a health care provider:  · Before starting a new exercise program.  · If you have questions or concerns about your  weight.  · If you have a medical problem that keeps you from exercising.  Get help right away if you have any of the following while exercising:  · Injury.  · Dizziness.  · Difficulty breathing or shortness of breath that does not go away when you stop exercising.  · Chest pain.  · Rapid heartbeat.  Summary  · Being overweight increases your risk of heart disease, stroke, diabetes, high blood pressure, and several types of cancer.  · Losing weight happens when you burn more calories than you eat.  · Reducing the amount of calories you eat in addition to getting regular moderate or vigorous exercise each week helps you lose weight.  This information is not intended to replace advice given to you by your health care provider. Make sure you discuss any questions you have with your health care provider.  Document Released: 01/20/2012 Document Revised: 12/31/2018 Document Reviewed: 12/31/2018  [employer] Interactive Patient Education © 2019 [employer]

## 2020-01-27 ENCOUNTER — APPOINTMENT (OUTPATIENT)
Dept: GENERAL RADIOLOGY | Facility: HOSPITAL | Age: 40
End: 2020-01-27

## 2020-01-27 ENCOUNTER — HOSPITAL ENCOUNTER (EMERGENCY)
Facility: HOSPITAL | Age: 40
Discharge: HOME | End: 2020-01-27
Attending: EMERGENCY MEDICINE | Admitting: EMERGENCY MEDICINE

## 2020-01-27 VITALS
SYSTOLIC BLOOD PRESSURE: 143 MMHG | RESPIRATION RATE: 18 BRPM | BODY MASS INDEX: 34.4 KG/M2 | WEIGHT: 254 LBS | TEMPERATURE: 97.8 F | DIASTOLIC BLOOD PRESSURE: 75 MMHG | OXYGEN SATURATION: 100 % | HEIGHT: 72 IN | HEART RATE: 50 BPM

## 2020-01-27 DIAGNOSIS — J06.9 VIRAL URI WITH COUGH: Primary | ICD-10-CM

## 2020-01-27 DIAGNOSIS — I10 HYPERTENSION, UNSPECIFIED TYPE: ICD-10-CM

## 2020-01-27 LAB
ALBUMIN SERPL-MCNC: 3.9 G/DL (ref 3.5–5.2)
ALBUMIN/GLOB SERPL: 1.1 G/DL
ALP SERPL-CCNC: 42 U/L (ref 39–117)
ALT SERPL W P-5'-P-CCNC: 15 U/L (ref 1–33)
ANION GAP SERPL CALCULATED.3IONS-SCNC: 10 MMOL/L (ref 5–15)
AST SERPL-CCNC: 19 U/L (ref 1–32)
BACTERIA UR QL AUTO: ABNORMAL /HPF
BASOPHILS # BLD AUTO: 0.02 10*3/MM3 (ref 0–0.2)
BASOPHILS NFR BLD AUTO: 0.4 % (ref 0–1.5)
BILIRUB SERPL-MCNC: <0.2 MG/DL (ref 0.2–1.2)
BILIRUB UR QL STRIP: NEGATIVE
BUN BLD-MCNC: 23 MG/DL (ref 6–20)
BUN/CREAT SERPL: 12.4 (ref 7–25)
CALCIUM SPEC-SCNC: 9.1 MG/DL (ref 8.6–10.5)
CHLORIDE SERPL-SCNC: 106 MMOL/L (ref 98–107)
CLARITY UR: CLEAR
CO2 SERPL-SCNC: 22 MMOL/L (ref 22–29)
COLOR UR: YELLOW
CREAT BLD-MCNC: 1.86 MG/DL (ref 0.57–1)
DEPRECATED RDW RBC AUTO: 43 FL (ref 37–54)
EOSINOPHIL # BLD AUTO: 0.08 10*3/MM3 (ref 0–0.4)
EOSINOPHIL NFR BLD AUTO: 1.8 % (ref 0.3–6.2)
ERYTHROCYTE [DISTWIDTH] IN BLOOD BY AUTOMATED COUNT: 12.6 % (ref 12.3–15.4)
GFR SERPL CREATININE-BSD FRML MDRD: 36 ML/MIN/1.73
GLOBULIN UR ELPH-MCNC: 3.6 GM/DL
GLUCOSE BLD-MCNC: 80 MG/DL (ref 65–99)
GLUCOSE UR STRIP-MCNC: NEGATIVE MG/DL
HCT VFR BLD AUTO: 39.1 % (ref 34–46.6)
HGB BLD-MCNC: 13 G/DL (ref 12–15.9)
HGB UR QL STRIP.AUTO: ABNORMAL
HYALINE CASTS UR QL AUTO: ABNORMAL /LPF
IMM GRANULOCYTES # BLD AUTO: 0.01 10*3/MM3 (ref 0–0.05)
IMM GRANULOCYTES NFR BLD AUTO: 0.2 % (ref 0–0.5)
KETONES UR QL STRIP: NEGATIVE
LEUKOCYTE ESTERASE UR QL STRIP.AUTO: NEGATIVE
LYMPHOCYTES # BLD AUTO: 0.82 10*3/MM3 (ref 0.7–3.1)
LYMPHOCYTES NFR BLD AUTO: 18 % (ref 19.6–45.3)
MCH RBC QN AUTO: 31 PG (ref 26.6–33)
MCHC RBC AUTO-ENTMCNC: 33.2 G/DL (ref 31.5–35.7)
MCV RBC AUTO: 93.3 FL (ref 79–97)
MONOCYTES # BLD AUTO: 0.35 10*3/MM3 (ref 0.1–0.9)
MONOCYTES NFR BLD AUTO: 7.7 % (ref 5–12)
NEUTROPHILS # BLD AUTO: 3.27 10*3/MM3 (ref 1.7–7)
NEUTROPHILS NFR BLD AUTO: 71.9 % (ref 42.7–76)
NITRITE UR QL STRIP: NEGATIVE
NRBC BLD AUTO-RTO: 0 /100 WBC (ref 0–0.2)
NT-PROBNP SERPL-MCNC: 304.9 PG/ML (ref 5–450)
PH UR STRIP.AUTO: 6 [PH] (ref 5–9)
PLATELET # BLD AUTO: 147 10*3/MM3 (ref 140–450)
PMV BLD AUTO: 10.7 FL (ref 6–12)
POTASSIUM BLD-SCNC: 4.4 MMOL/L (ref 3.5–5.2)
PROT SERPL-MCNC: 7.5 G/DL (ref 6–8.5)
PROT UR QL STRIP: ABNORMAL
RBC # BLD AUTO: 4.19 10*6/MM3 (ref 3.77–5.28)
RBC # UR: ABNORMAL /HPF
REF LAB TEST METHOD: ABNORMAL
SODIUM BLD-SCNC: 138 MMOL/L (ref 136–145)
SP GR UR STRIP: <=1.005 (ref 1–1.03)
SQUAMOUS #/AREA URNS HPF: ABNORMAL /HPF
TROPONIN T SERPL-MCNC: <0.01 NG/ML (ref 0–0.03)
UROBILINOGEN UR QL STRIP: ABNORMAL
WBC NRBC COR # BLD: 4.55 10*3/MM3 (ref 3.4–10.8)
WBC UR QL AUTO: ABNORMAL /HPF

## 2020-01-27 PROCEDURE — 99284 EMERGENCY DEPT VISIT MOD MDM: CPT

## 2020-01-27 PROCEDURE — 93005 ELECTROCARDIOGRAM TRACING: CPT | Performed by: EMERGENCY MEDICINE

## 2020-01-27 PROCEDURE — 84484 ASSAY OF TROPONIN QUANT: CPT | Performed by: EMERGENCY MEDICINE

## 2020-01-27 PROCEDURE — 83880 ASSAY OF NATRIURETIC PEPTIDE: CPT | Performed by: EMERGENCY MEDICINE

## 2020-01-27 PROCEDURE — 71046 X-RAY EXAM CHEST 2 VIEWS: CPT

## 2020-01-27 PROCEDURE — 80053 COMPREHEN METABOLIC PANEL: CPT | Performed by: EMERGENCY MEDICINE

## 2020-01-27 PROCEDURE — 81001 URINALYSIS AUTO W/SCOPE: CPT | Performed by: EMERGENCY MEDICINE

## 2020-01-27 PROCEDURE — 93010 ELECTROCARDIOGRAM REPORT: CPT | Performed by: INTERNAL MEDICINE

## 2020-01-27 PROCEDURE — 85025 COMPLETE CBC W/AUTO DIFF WBC: CPT | Performed by: EMERGENCY MEDICINE

## 2020-01-27 RX ORDER — SODIUM CHLORIDE 0.9 % (FLUSH) 0.9 %
10 SYRINGE (ML) INJECTION AS NEEDED
Status: DISCONTINUED | OUTPATIENT
Start: 2020-01-27 | End: 2020-01-27 | Stop reason: HOSPADM

## 2020-02-20 RX ORDER — METOPROLOL TARTRATE 50 MG/1
50 TABLET, FILM COATED ORAL EVERY 12 HOURS
Qty: 60 TABLET | Refills: 1 | Status: SHIPPED | OUTPATIENT
Start: 2020-02-20 | End: 2020-04-21

## 2020-03-13 RX ORDER — AMLODIPINE BESYLATE 5 MG/1
TABLET ORAL
Qty: 30 TABLET | Refills: 2 | Status: SHIPPED | OUTPATIENT
Start: 2020-03-13 | End: 2020-09-15

## 2020-03-16 ENCOUNTER — OFFICE VISIT (OUTPATIENT)
Dept: CARDIOLOGY | Facility: CLINIC | Age: 40
End: 2020-03-16

## 2020-03-16 VITALS
DIASTOLIC BLOOD PRESSURE: 84 MMHG | HEART RATE: 71 BPM | HEIGHT: 72 IN | WEIGHT: 261.6 LBS | BODY MASS INDEX: 35.43 KG/M2 | SYSTOLIC BLOOD PRESSURE: 130 MMHG | OXYGEN SATURATION: 99 %

## 2020-03-16 DIAGNOSIS — I71.019 AORTIC DISSECTION, THORACIC (HCC): ICD-10-CM

## 2020-03-16 DIAGNOSIS — Z98.890 HISTORY OF AORTIC ROOT REPAIR: ICD-10-CM

## 2020-03-16 DIAGNOSIS — I10 ESSENTIAL HYPERTENSION: Primary | ICD-10-CM

## 2020-03-16 DIAGNOSIS — Z98.890 H/O AORTIC ARCH REPAIR: ICD-10-CM

## 2020-03-16 PROCEDURE — 99214 OFFICE O/P EST MOD 30 MIN: CPT | Performed by: INTERNAL MEDICINE

## 2020-03-16 NOTE — PROGRESS NOTES
Marta Chambers  39 y.o. female      1. Essential hypertension    2. Aortic dissection, thoracic (CMS/HCC)    3. History of aortic root repair    4. H/O aortic arch repair        History of Present Illness   Ms. Marta Chambers is a 39 y.o. female with a history of obesity and hypertension who presented with [place] type A dissection in October 2014 and underwent repair by  in [place], [place].  PROCEDURES PERFORMED:   1. Urgent repair of a type A aortic dissection using a #36 Vascutek  interposition graft to reconstruct the aortic arch as a hemiarch  reconstruction, and a total replacement of the ascending aorta.   2. Resuspension and reconstruction of the aortic root and valve using BioGlue.    She has CKD and is being monitored closely.  Her blood pressure was in normal range and was 130/84 mmHg. She has been compliant with her medications.  Clinical exam otherwise was unremarkable with no signs of congestive heart failure.    Echocardiogram performed in August 2019 showed:  · Left ventricular wall thickness is consistent with moderate concentric hypertrophy.  · Estimated EF = 65%.  · Left ventricular systolic function is normal.  · Left ventricular diastolic dysfunction (grade I) consistent with impaired relaxation.  · Mild to moderate aortic valve regurgitation is present.  · Mild dilation of the aortic root is present (3.8 cms)      SUBJECTIVE    No Known Allergies      Past Medical History:   Diagnosis Date   • Acute renal failure (CMS/HCC)    • Anemia    • Aneurysm, aortic (CMS/HCC)    • Aortic dissection (CMS/HCC)    • Chest pain    • GERD (gastroesophageal reflux disease)    • Hemorrhoids    • Hypertension    • Obesity          Past Surgical History:   Procedure Laterality Date   • ASCENDING ARCH/HEMIARCH REPLACEMENT     • CARDIAC VALVE SURGERY     • HAND SURGERY     • OTHER SURGICAL HISTORY      aortic dissection repair   • TUBAL ABDOMINAL LIGATION           Family History   Problem Relation Age of  "Onset   • Sudden death Father         cardiac   • Other Father         ruptured aortic aneurysm   • Diabetes Other    • Hypertension Other          Social History     Socioeconomic History   • Marital status: Single     Spouse name: Not on file   • Number of children: Not on file   • Years of education: Not on file   • Highest education level: Not on file   Tobacco Use   • Smoking status: Current Every Day Smoker     Packs/day: 0.50     Types: Cigarettes   • Smokeless tobacco: Never Used   Substance and Sexual Activity   • Alcohol use: Yes     Comment: social   • Drug use: No   • Sexual activity: Defer         Current Outpatient Medications   Medication Sig Dispense Refill   • amLODIPine (NORVASC) 5 MG tablet TAKE ONE TABLET BY MOUTH EVERY EVENING 30 tablet 2   • aspirin 81 MG chewable tablet Chew 81 mg Daily.     • ferrous sulfate 325 (65 FE) MG tablet Take 1 tablet by mouth 2 (Two) Times a Day. 60 tablet 3   • hydrocortisone (ANUSOL-HC) 25 MG suppository Insert 1 suppository into the rectum 2 (Two) Times a Day As Needed for Hemorrhoids. 24 suppository 3   • hydroxychloroquine (PLAQUENIL) 200 MG tablet Take 200 mg by mouth Daily.     • losartan (COZAAR) 50 MG tablet Take 100 mg by mouth Daily.     • metoprolol tartrate (LOPRESSOR) 50 MG tablet Take 1 tablet by mouth Every 12 (Twelve) Hours. 60 tablet 1   • Multiple Vitamins-Minerals (MULTIVITAMIN ADULT PO) Take 1 tablet by mouth Daily.     • vitamin D (ERGOCALCIFEROL) 1.25 MG (77457 UT) capsule capsule Take 1 capsule by mouth 1 (One) Time Per Week. 4 capsule 5     No current facility-administered medications for this visit.          OBJECTIVE    /84 (BP Location: Left arm, Patient Position: Sitting, Cuff Size: Adult)   Pulse 71   Ht 182.9 cm (72\")   Wt 119 kg (261 lb 9.6 oz)   SpO2 99%   BMI 35.48 kg/m²         Review of Systems     Constitutional:  Denies recent weight loss, weight gain, fever or chills, no change in exercise tolerance     HENT:  Denies " any hearing loss, epistaxis, hoarseness, or difficulty speaking.     Eyes: Wears eyeglasses or contact lenses     Respiratory:  Denies dyspnea with exertion,no cough, wheezing, or hemoptysis.     Cardiovascular: Negative for palpations, chest pain    Gastrointestinal:  Denies change in bowel habits, dyspepsia, ulcer disease, hematochezia, or melena.     Endocrine: Negative for cold intolerance, heat intolerance, polydipsia, polyphagia and polyuria.     Genitourinary: CKD      Musculoskeletal: Denies any history of arthritic symptoms or back problems     Skin:  Denies any change in hair or nails, rashes, or skin lesions.     Allergic/Immunologic: Negative.  Negative for environmental allergies, food allergies and immunocompromised state.     Neurological:  Denies any history of recurrent headaches, strokes, TIA, or seizure disorder.     Hematological: Denies any food allergies, seasonal allergies, bleeding disorders, or lymphadenopathy.     Psychiatric/Behavioral: Denies any history of depression, substance abuse, or change in cognitive function.         Physical Exam     Constitutional: Cooperative, alert and oriented,  in no acute distress.     HENT:   Head: Normocephalic, normal hair patterns, no masses or tenderness.  Ears, Nose, and Throat: No gross abnormalities. No pallor or cyanosis. Dentition good.   Eyes: EOMS intact, PERRL, conjunctivae and lids unremarkable. Fundoscopic exam and visual fields not performed.   Neck: No palpable masses or adenopathy, no thyromegaly, no JVD, carotid pulses are full and equal bilaterally and without  Bruits.     Cardiovascular: Regular rhythm, S1 and S2 normal, no S3 or S4. No murmurs, gallops, or rubs detected.     Pulmonary/Chest: Chest: normal symmetry,  normal respiratory excursion, no intercostal retraction, no use of accessory muscles.            Pulmonary: Normal breath sounds. No rales or ronchi.    Abdominal: Abdomen soft, bowel sounds normoactive, no masses, no  hepatosplenomegaly, non-tender, no bruits.     Musculoskeletal: No deformities, clubbing, cyanosis, erythema, or edema observed.     Neurological: No gross motor or sensory deficits noted, affect appropriate, oriented to time, person, place.     Skin: Warm and dry to the touch, no apparent skin lesions or masses noted.     Psychiatric: She has a normal mood and affect. Her behavior is normal. Judgment and thought content normal.         Procedures      Lab Results   Component Value Date    WBC 4.55 01/27/2020    HGB 13.0 01/27/2020    HCT 39.1 01/27/2020    MCV 93.3 01/27/2020     01/27/2020     Lab Results   Component Value Date    GLUCOSE 80 01/27/2020    BUN 23 (H) 01/27/2020    CREATININE 1.86 (H) 01/27/2020    EGFRIFAFRI 36 (L) 01/27/2020    BCR 12.4 01/27/2020    CO2 22.0 01/27/2020    CALCIUM 9.1 01/27/2020    ALBUMIN 3.90 01/27/2020    AST 19 01/27/2020    ALT 15 01/27/2020     Lab Results   Component Value Date    CHOL 157 06/29/2018    CHOL 159 07/06/2017     Lab Results   Component Value Date    TRIG 84 06/29/2018    TRIG 163 07/06/2017    TRIG 77 10/22/2014     Lab Results   Component Value Date    HDL 48 (L) 06/29/2018    HDL 43 (L) 07/06/2017    HDL 44 10/22/2014     No components found for: LDLCALC  Lab Results   Component Value Date    LDL 80 06/29/2018    LDL 83 07/06/2017    LDL 84 10/22/2014     No results found for: HDLLDLRATIO  No components found for: CHOLHDL  Lab Results   Component Value Date    HGBA1C 5.3 10/23/2014     Lab Results   Component Value Date    TSH 1.450 06/18/2019           ASSESSMENT AND PLAN  Ms. Chambers is stable with regards to her heart with no evidence of cardiac symptoms at the present time.  Her blood pressure is not under control and I have stressed the importance of optimization.  I have started her on amlodipine 5 mg in p.m. in addition to losartan 100 mg in a.m. and metoprolol tartrate 50 mg twice a day has been continued.  She will continue follow-up with  nephrology on a regular basis.  She has lab work done recently and this was reviewed.    Marta was seen today for follow-up.    Diagnoses and all orders for this visit:    Essential hypertension    Aortic dissection, thoracic (CMS/HCC)    History of aortic root repair    H/O aortic arch repair        Patient's Body mass index is 35.48 kg/m². BMI is above normal parameters. Recommendations include: exercise counseling and nutrition counseling.  Patient is a non-smoker.    Katerina Solis MD  3/16/2020  09:19

## 2020-04-21 RX ORDER — METOPROLOL TARTRATE 50 MG/1
TABLET, FILM COATED ORAL
Qty: 90 TABLET | Refills: 3 | Status: SHIPPED | OUTPATIENT
Start: 2020-04-21 | End: 2020-11-17 | Stop reason: SDUPTHER

## 2020-05-28 DIAGNOSIS — E55.9 VITAMIN D DEFICIENCY: ICD-10-CM

## 2020-05-28 RX ORDER — ERGOCALCIFEROL 1.25 MG/1
CAPSULE ORAL
Qty: 4 CAPSULE | Refills: 2 | Status: SHIPPED | OUTPATIENT
Start: 2020-05-28 | End: 2020-10-15

## 2020-06-09 DIAGNOSIS — D50.9 IRON DEFICIENCY ANEMIA, UNSPECIFIED IRON DEFICIENCY ANEMIA TYPE: ICD-10-CM

## 2020-06-09 RX ORDER — FERROUS SULFATE 325(65) MG
TABLET ORAL
Qty: 180 TABLET | Refills: 0 | Status: SHIPPED | OUTPATIENT
Start: 2020-06-09 | End: 2020-08-17

## 2020-06-19 ENCOUNTER — TRANSCRIBE ORDERS (OUTPATIENT)
Dept: LAB | Facility: HOSPITAL | Age: 40
End: 2020-06-19

## 2020-06-19 ENCOUNTER — LAB (OUTPATIENT)
Dept: LAB | Facility: HOSPITAL | Age: 40
End: 2020-06-19

## 2020-06-19 ENCOUNTER — OFFICE VISIT (OUTPATIENT)
Dept: FAMILY MEDICINE CLINIC | Facility: CLINIC | Age: 40
End: 2020-06-19

## 2020-06-19 VITALS
RESPIRATION RATE: 20 BRPM | DIASTOLIC BLOOD PRESSURE: 71 MMHG | HEIGHT: 72 IN | TEMPERATURE: 97.4 F | OXYGEN SATURATION: 96 % | HEART RATE: 68 BPM | BODY MASS INDEX: 36.3 KG/M2 | SYSTOLIC BLOOD PRESSURE: 136 MMHG | WEIGHT: 268 LBS

## 2020-06-19 DIAGNOSIS — D64.9 ANEMIA, UNSPECIFIED TYPE: ICD-10-CM

## 2020-06-19 DIAGNOSIS — N94.6 DYSMENORRHEA: ICD-10-CM

## 2020-06-19 DIAGNOSIS — I10 ESSENTIAL HYPERTENSION: ICD-10-CM

## 2020-06-19 DIAGNOSIS — I10 ESSENTIAL HYPERTENSION: Primary | ICD-10-CM

## 2020-06-19 LAB
BASOPHILS # BLD AUTO: 0.02 10*3/MM3 (ref 0–0.2)
BASOPHILS NFR BLD AUTO: 0.4 % (ref 0–1.5)
DEPRECATED RDW RBC AUTO: 45.1 FL (ref 37–54)
EOSINOPHIL # BLD AUTO: 0.14 10*3/MM3 (ref 0–0.4)
EOSINOPHIL NFR BLD AUTO: 2.9 % (ref 0.3–6.2)
ERYTHROCYTE [DISTWIDTH] IN BLOOD BY AUTOMATED COUNT: 13.2 % (ref 12.3–15.4)
HCT VFR BLD AUTO: 40.7 % (ref 34–46.6)
HGB BLD-MCNC: 13.4 G/DL (ref 12–15.9)
IMM GRANULOCYTES # BLD AUTO: 0 10*3/MM3 (ref 0–0.05)
IMM GRANULOCYTES NFR BLD AUTO: 0 % (ref 0–0.5)
IRON 24H UR-MRATE: 45 MCG/DL (ref 37–145)
IRON SATN MFR SERPL: 14 % (ref 20–50)
LYMPHOCYTES # BLD AUTO: 1.39 10*3/MM3 (ref 0.7–3.1)
LYMPHOCYTES NFR BLD AUTO: 28.5 % (ref 19.6–45.3)
MCH RBC QN AUTO: 31.2 PG (ref 26.6–33)
MCHC RBC AUTO-ENTMCNC: 32.9 G/DL (ref 31.5–35.7)
MCV RBC AUTO: 94.7 FL (ref 79–97)
MONOCYTES # BLD AUTO: 0.6 10*3/MM3 (ref 0.1–0.9)
MONOCYTES NFR BLD AUTO: 12.3 % (ref 5–12)
NEUTROPHILS # BLD AUTO: 2.73 10*3/MM3 (ref 1.7–7)
NEUTROPHILS NFR BLD AUTO: 55.9 % (ref 42.7–76)
NRBC BLD AUTO-RTO: 0 /100 WBC (ref 0–0.2)
PLATELET # BLD AUTO: 193 10*3/MM3 (ref 140–450)
PMV BLD AUTO: 11.6 FL (ref 6–12)
RBC # BLD AUTO: 4.3 10*6/MM3 (ref 3.77–5.28)
TIBC SERPL-MCNC: 328 MCG/DL (ref 298–536)
TRANSFERRIN SERPL-MCNC: 220 MG/DL (ref 200–360)
WBC NRBC COR # BLD: 4.88 10*3/MM3 (ref 3.4–10.8)

## 2020-06-19 PROCEDURE — 85025 COMPLETE CBC W/AUTO DIFF WBC: CPT

## 2020-06-19 PROCEDURE — 84466 ASSAY OF TRANSFERRIN: CPT | Performed by: NURSE PRACTITIONER

## 2020-06-19 PROCEDURE — 99214 OFFICE O/P EST MOD 30 MIN: CPT | Performed by: NURSE PRACTITIONER

## 2020-06-19 PROCEDURE — 83540 ASSAY OF IRON: CPT | Performed by: NURSE PRACTITIONER

## 2020-06-19 RX ORDER — CLONIDINE HYDROCHLORIDE 0.1 MG/1
0.1 TABLET ORAL 2 TIMES DAILY
Qty: 30 TABLET | Refills: 3 | Status: SHIPPED | OUTPATIENT
Start: 2020-06-19 | End: 2020-09-03

## 2020-07-03 ENCOUNTER — HOSPITAL ENCOUNTER (OUTPATIENT)
Dept: ULTRASOUND IMAGING | Facility: HOSPITAL | Age: 40
Discharge: HOME | End: 2020-07-03
Admitting: NURSE PRACTITIONER

## 2020-07-03 DIAGNOSIS — N94.6 DYSMENORRHEA: ICD-10-CM

## 2020-07-03 PROCEDURE — 76856 US EXAM PELVIC COMPLETE: CPT

## 2020-07-03 PROCEDURE — 76830 TRANSVAGINAL US NON-OB: CPT

## 2020-07-06 NOTE — PROGRESS NOTES
Subjective   Marta Chambers is a 39 y.o. female.     Here today for kathleen.  She is having issues with pain and cramping during her period.  She reports regular periods.  Heavy to mod flow.  She also report her b/p elevating during her period.  She does not have specific b/p readings.    Hypertension   This is a new problem. The current episode started more than 1 month ago. The problem has been waxing and waning since onset. The problem is controlled. Pertinent negatives include no anxiety, blurred vision, chest pain, headaches, malaise/fatigue, neck pain, orthopnea, palpitations, peripheral edema, PND, shortness of breath or sweats. There are no associated agents to hypertension. Risk factors for coronary artery disease include obesity, smoking/tobacco exposure and sedentary lifestyle. Past treatments include calcium channel blockers, central alpha agonists, beta blockers and angiotensin blockers. Current antihypertension treatment includes calcium channel blockers, central alpha agonists, beta blockers and angiotensin blockers. There are no compliance problems.  There is no history of angina, kidney disease, CAD/MI, CVA, heart failure, left ventricular hypertrophy, PVD or retinopathy.   Menstrual Problem   This is a chronic problem. The current episode started more than 1 month ago. The problem occurs constantly. The problem has been unchanged. Pertinent negatives include no abdominal pain, anorexia, arthralgias, change in bowel habit, chest pain, chills, congestion, coughing, diaphoresis, fatigue, fever, headaches, joint swelling, myalgias, nausea, neck pain, numbness, rash, sore throat, swollen glands, urinary symptoms, vertigo, visual change, vomiting or weakness. Nothing aggravates the symptoms. She has tried nothing for the symptoms. The treatment provided no relief.        The following portions of the patient's history were reviewed and updated as appropriate: allergies, current medications, past family  history, past medical history, past social history, past surgical history and problem list.    Review of Systems   Constitutional: Negative.  Negative for chills, diaphoresis, fatigue, fever and malaise/fatigue.   HENT: Negative.  Negative for congestion, sore throat and swollen glands.    Eyes: Negative.  Negative for blurred vision.   Respiratory: Negative.  Negative for cough and shortness of breath.    Cardiovascular: Negative.  Negative for chest pain, palpitations, orthopnea and PND.   Gastrointestinal: Negative.  Negative for abdominal pain, anorexia, change in bowel habit, nausea and vomiting.   Endocrine: Negative.    Genitourinary: Positive for menstrual problem.   Musculoskeletal: Negative.  Negative for arthralgias, joint swelling, myalgias and neck pain.   Skin: Negative.  Negative for rash.   Allergic/Immunologic: Negative.    Neurological: Negative.  Negative for vertigo, weakness and numbness.   Hematological: Negative.    Psychiatric/Behavioral: Negative.        Objective   Physical Exam   Constitutional: She is oriented to person, place, and time. She appears well-developed and well-nourished. No distress.   HENT:   Head: Normocephalic and atraumatic.   Nose: Nose normal.   Mouth/Throat: No oropharyngeal exudate.   Eyes: Pupils are equal, round, and reactive to light.   Neck: Normal range of motion. Neck supple. No thyromegaly present.   Cardiovascular: Normal rate, regular rhythm and normal heart sounds. Exam reveals no friction rub.   No murmur heard.  Pulmonary/Chest: Effort normal and breath sounds normal. No respiratory distress. She has no wheezes. She has no rales.   Abdominal: Soft.   Genitourinary:   Genitourinary Comments: Exam deferred   Musculoskeletal: Normal range of motion.   Neurological: She is alert and oriented to person, place, and time.   Skin: Skin is warm and dry.   Psychiatric: She has a normal mood and affect. Thought content normal.   Nursing note and vitals  reviewed.        Assessment/Plan   Marta was seen today for hypertension.    Diagnoses and all orders for this visit:    Essential hypertension  -     cloNIDine (Catapres) 0.1 MG tablet; Take 1 tablet by mouth 2 (Two) Times a Day. If blood pressure is elevated    Dysmenorrhea  -     Cancel: US Pelvis Complete; Future  -     Cancel: US non-ob transvaginal; Future    Anemia, unspecified type  -     Iron and TIBC      bmi is noted to be 36.3 which is considered obese.  Diet and exercise info provided.         Answers for HPI/ROS submitted by the patient on 6/17/2020   What is the primary reason for your visit?: Other  Please describe your symptoms.: 6 month check up  Have you had these symptoms before?: No  How long have you been having these symptoms?: 1-4 days  Please list any medications you are currently taking for this condition.: Na  Please describe any probable cause for these symptoms. : Na

## 2020-07-06 NOTE — PATIENT INSTRUCTIONS
Calorie Counting for Weight Loss  Calories are units of energy. Your body needs a certain amount of calories from food to keep you going throughout the day. When you eat more calories than your body needs, your body stores the extra calories as fat. When you eat fewer calories than your body needs, your body burns fat to get the energy it needs.  Calorie counting means keeping track of how many calories you eat and drink each day. Calorie counting can be helpful if you need to lose weight. If you make sure to eat fewer calories than your body needs, you should lose weight. Ask your health care provider what a healthy weight is for you.  For calorie counting to work, you will need to eat the right number of calories in a day in order to lose a healthy amount of weight per week. A dietitian can help you determine how many calories you need in a day and will give you suggestions on how to reach your calorie goal.  · A healthy amount of weight to lose per week is usually 1-2 lb (0.5-0.9 kg). This usually means that your daily calorie intake should be reduced by 500-750 calories.  · Eating 1,200 - 1,500 calories per day can help most women lose weight.  · Eating 1,500 - 1,800 calories per day can help most men lose weight.  What is my plan?  My goal is to have __________ calories per day.  If I have this many calories per day, I should lose around __________ pounds per week.  What do I need to know about calorie counting?  In order to meet your daily calorie goal, you will need to:  · Find out how many calories are in each food you would like to eat. Try to do this before you eat.  · Decide how much of the food you plan to eat.  · Write down what you ate and how many calories it had. Doing this is called keeping a food log.  To successfully lose weight, it is important to balance calorie counting with a healthy lifestyle that includes regular activity. Aim for 150 minutes of moderate exercise (such as walking) or 75  minutes of vigorous exercise (such as running) each week.  Where do I find calorie information?    The number of calories in a food can be found on a Nutrition Facts label. If a food does not have a Nutrition Facts label, try to look up the calories online or ask your dietitian for help.  Remember that calories are listed per serving. If you choose to have more than one serving of a food, you will have to multiply the calories per serving by the amount of servings you plan to eat. For example, the label on a package of bread might say that a serving size is 1 slice and that there are 90 calories in a serving. If you eat 1 slice, you will have eaten 90 calories. If you eat 2 slices, you will have eaten 180 calories.  How do I keep a food log?  Immediately after each meal, record the following information in your food log:  · What you ate. Don't forget to include toppings, sauces, and other extras on the food.  · How much you ate. This can be measured in cups, ounces, or number of items.  · How many calories each food and drink had.  · The total number of calories in the meal.  Keep your food log near you, such as in a small notebook in your pocket, or use a mobile melvin or website. Some programs will calculate calories for you and show you how many calories you have left for the day to meet your goal.  What are some calorie counting tips?    · Use your calories on foods and drinks that will fill you up and not leave you hungry:  ? Some examples of foods that fill you up are nuts and nut butters, vegetables, lean proteins, and high-fiber foods like whole grains. High-fiber foods are foods with more than 5 g fiber per serving.  ? Drinks such as sodas, specialty coffee drinks, alcohol, and juices have a lot of calories, yet do not fill you up.  · Eat nutritious foods and avoid empty calories. Empty calories are calories you get from foods or beverages that do not have many vitamins or protein, such as candy, sweets, and  "soda. It is better to have a nutritious high-calorie food (such as an avocado) than a food with few nutrients (such as a bag of chips).  · Know how many calories are in the foods you eat most often. This will help you calculate calorie counts faster.  · Pay attention to calories in drinks. Low-calorie drinks include water and unsweetened drinks.  · Pay attention to nutrition labels for \"low fat\" or \"fat free\" foods. These foods sometimes have the same amount of calories or more calories than the full fat versions. They also often have added sugar, starch, or salt, to make up for flavor that was removed with the fat.  · Find a way of tracking calories that works for you. Get creative. Try different apps or programs if writing down calories does not work for you.  What are some portion control tips?  · Know how many calories are in a serving. This will help you know how many servings of a certain food you can have.  · Use a measuring cup to measure serving sizes. You could also try weighing out portions on a kitchen scale. With time, you will be able to estimate serving sizes for some foods.  · Take some time to put servings of different foods on your favorite plates, bowls, and cups so you know what a serving looks like.  · Try not to eat straight from a bag or box. Doing this can lead to overeating. Put the amount you would like to eat in a cup or on a plate to make sure you are eating the right portion.  · Use smaller plates, glasses, and bowls to prevent overeating.  · Try not to multitask (for example, watch TV or use your computer) while eating. If it is time to eat, sit down at a table and enjoy your food. This will help you to know when you are full. It will also help you to be aware of what you are eating and how much you are eating.  What are tips for following this plan?  Reading food labels  · Check the calorie count compared to the serving size. The serving size may be smaller than what you are used to " "eating.  · Check the source of the calories. Make sure the food you are eating is high in vitamins and protein and low in saturated and trans fats.  Shopping  · Read nutrition labels while you shop. This will help you make healthy decisions before you decide to purchase your food.  · Make a grocery list and stick to it.  Cooking  · Try to cook your favorite foods in a healthier way. For example, try baking instead of frying.  · Use low-fat dairy products.  Meal planning  · Use more fruits and vegetables. Half of your plate should be fruits and vegetables.  · Include lean proteins like poultry and fish.  How do I count calories when eating out?  · Ask for smaller portion sizes.  · Consider sharing an entree and sides instead of getting your own entree.  · If you get your own entree, eat only half. Ask for a box at the beginning of your meal and put the rest of your entree in it so you are not tempted to eat it.  · If calories are listed on the menu, choose the lower calorie options.  · Choose dishes that include vegetables, fruits, whole grains, low-fat dairy products, and lean protein.  · Choose items that are boiled, broiled, grilled, or steamed. Stay away from items that are buttered, battered, fried, or served with cream sauce. Items labeled \"crispy\" are usually fried, unless stated otherwise.  · Choose water, low-fat milk, unsweetened iced tea, or other drinks without added sugar. If you want an alcoholic beverage, choose a lower calorie option such as a glass of wine or light beer.  · Ask for dressings, sauces, and syrups on the side. These are usually high in calories, so you should limit the amount you eat.  · If you want a salad, choose a garden salad and ask for grilled meats. Avoid extra toppings like wynne, cheese, or fried items. Ask for the dressing on the side, or ask for olive oil and vinegar or lemon to use as dressing.  · Estimate how many servings of a food you are given. For example, a serving of " cooked rice is ½ cup or about the size of half a baseball. Knowing serving sizes will help you be aware of how much food you are eating at restaurants. The list below tells you how big or small some common portion sizes are based on everyday objects:  ? 1 oz--4 stacked dice.  ? 3 oz--1 deck of cards.  ? 1 tsp--1 die.  ? 1 Tbsp--½ a ping-pong ball.  ? 2 Tbsp--1 ping-pong ball.  ? ½ cup--½ baseball.  ? 1 cup--1 baseball.  Summary  · Calorie counting means keeping track of how many calories you eat and drink each day. If you eat fewer calories than your body needs, you should lose weight.  · A healthy amount of weight to lose per week is usually 1-2 lb (0.5-0.9 kg). This usually means reducing your daily calorie intake by 500-750 calories.  · The number of calories in a food can be found on a Nutrition Facts label. If a food does not have a Nutrition Facts label, try to look up the calories online or ask your dietitian for help.  · Use your calories on foods and drinks that will fill you up, and not on foods and drinks that will leave you hungry.  · Use smaller plates, glasses, and bowls to prevent overeating.  This information is not intended to replace advice given to you by your health care provider. Make sure you discuss any questions you have with your health care provider.  Document Released: 12/18/2006 Document Revised: 09/06/2019 Document Reviewed: 11/17/2017  [employer] Patient Education © 2020 [employer]      Exercising to Lose Weight  Exercise is structured, repetitive physical activity to improve fitness and health. Getting regular exercise is important for everyone. It is especially important if you are overweight. Being overweight increases your risk of heart disease, stroke, diabetes, high blood pressure, and several types of cancer. Reducing your calorie intake and exercising can help you lose weight.  Exercise is usually categorized as moderate or vigorous intensity. To lose weight, most people need  to do a certain amount of moderate-intensity or vigorous-intensity exercise each week.  Moderate-intensity exercise    Moderate-intensity exercise is any activity that gets you moving enough to burn at least three times more energy (calories) than if you were sitting.  Examples of moderate exercise include:  · Walking a mile in 15 minutes.  · Doing light yard work.  · Biking at an easy pace.  Most people should get at least 150 minutes (2 hours and 30 minutes) a week of moderate-intensity exercise to maintain their body weight.  Vigorous-intensity exercise  Vigorous-intensity exercise is any activity that gets you moving enough to burn at least six times more calories than if you were sitting. When you exercise at this intensity, you should be working hard enough that you are not able to carry on a conversation.  Examples of vigorous exercise include:  · Running.  · Playing a team sport, such as football, basketball, and soccer.  · Jumping rope.  Most people should get at least 75 minutes (1 hour and 15 minutes) a week of vigorous-intensity exercise to maintain their body weight.  How can exercise affect me?  When you exercise enough to burn more calories than you eat, you lose weight. Exercise also reduces body fat and builds muscle. The more muscle you have, the more calories you burn. Exercise also:  · Improves mood.  · Reduces stress and tension.  · Improves your overall fitness, flexibility, and endurance.  · Increases bone strength.  The amount of exercise you need to lose weight depends on:  · Your age.  · The type of exercise.  · Any health conditions you have.  · Your overall physical ability.  Talk to your health care provider about how much exercise you need and what types of activities are safe for you.  What actions can I take to lose weight?  Nutrition    · Make changes to your diet as told by your health care provider or diet and nutrition specialist (dietitian). This may include:  ? Eating fewer  calories.  ? Eating more protein.  ? Eating less unhealthy fats.  ? Eating a diet that includes fresh fruits and vegetables, whole grains, low-fat dairy products, and lean protein.  ? Avoiding foods with added fat, salt, and sugar.  · Drink plenty of water while you exercise to prevent dehydration or heat stroke.  Activity  · Choose an activity that you enjoy and set realistic goals. Your health care provider can help you make an exercise plan that works for you.  · Exercise at a moderate or vigorous intensity most days of the week.  ? The intensity of exercise may vary from person to person. You can tell how intense a workout is for you by paying attention to your breathing and heartbeat. Most people will notice their breathing and heartbeat get faster with more intense exercise.  · Do resistance training twice each week, such as:  ? Push-ups.  ? Sit-ups.  ? Lifting weights.  ? Using resistance bands.  · Getting short amounts of exercise can be just as helpful as long structured periods of exercise. If you have trouble finding time to exercise, try to include exercise in your daily routine.  ? Get up, stretch, and walk around every 30 minutes throughout the day.  ? Go for a walk during your lunch break.  ? Park your car farther away from your destination.  ? If you take public transportation, get off one stop early and walk the rest of the way.  ? Make phone calls while standing up and walking around.  ? Take the stairs instead of elevators or escalators.  · Wear comfortable clothes and shoes with good support.  · Do not exercise so much that you hurt yourself, feel dizzy, or get very short of breath.  Where to find more information  · [place] Department of Health and Human Services: www.hhs.gov  · Centers for Disease Control and Prevention (CDC): www.cdc.gov  Contact a health care provider:  · Before starting a new exercise program.  · If you have questions or concerns about your weight.  · If you have a medical  problem that keeps you from exercising.  Get help right away if you have any of the following while exercising:  · Injury.  · Dizziness.  · Difficulty breathing or shortness of breath that does not go away when you stop exercising.  · Chest pain.  · Rapid heartbeat.  Summary  · Being overweight increases your risk of heart disease, stroke, diabetes, high blood pressure, and several types of cancer.  · Losing weight happens when you burn more calories than you eat.  · Reducing the amount of calories you eat in addition to getting regular moderate or vigorous exercise each week helps you lose weight.  This information is not intended to replace advice given to you by your health care provider. Make sure you discuss any questions you have with your health care provider.  Document Released: 01/20/2012 Document Revised: 12/31/2018 Document Reviewed: 12/31/2018  [employer] Patient Education © 2020 [employer]

## 2020-07-10 ENCOUNTER — TELEPHONE (OUTPATIENT)
Dept: FAMILY MEDICINE CLINIC | Facility: CLINIC | Age: 40
End: 2020-07-10

## 2020-07-10 DIAGNOSIS — N94.6 DYSMENORRHEA: Primary | ICD-10-CM

## 2020-07-10 DIAGNOSIS — D25.0 FIBROIDS, SUBMUCOSAL: ICD-10-CM

## 2020-08-12 ENCOUNTER — OFFICE VISIT (OUTPATIENT)
Dept: OBSTETRICS AND GYNECOLOGY | Facility: CLINIC | Age: 40
End: 2020-08-12

## 2020-08-12 VITALS
HEIGHT: 70 IN | DIASTOLIC BLOOD PRESSURE: 80 MMHG | WEIGHT: 267 LBS | SYSTOLIC BLOOD PRESSURE: 128 MMHG | BODY MASS INDEX: 38.22 KG/M2

## 2020-08-12 DIAGNOSIS — N94.6 DYSMENORRHEA: ICD-10-CM

## 2020-08-12 DIAGNOSIS — R10.2 PELVIC PAIN: ICD-10-CM

## 2020-08-12 DIAGNOSIS — D25.9 UTERINE LEIOMYOMA, UNSPECIFIED LOCATION: Primary | ICD-10-CM

## 2020-08-12 PROBLEM — N18.30 CHRONIC RENAL INSUFFICIENCY, STAGE III (MODERATE): Status: ACTIVE | Noted: 2020-08-12

## 2020-08-12 PROBLEM — M35.00 SJOGREN'S SYNDROME: Status: ACTIVE | Noted: 2020-08-12

## 2020-08-12 PROCEDURE — 99213 OFFICE O/P EST LOW 20 MIN: CPT | Performed by: NURSE PRACTITIONER

## 2020-08-12 NOTE — PROGRESS NOTES
Subjective   Chief Complaint   Patient presents with   • Gynecologic Exam     zachery Chambers is a 39 y.o. year old  presenting to be seen because of referral from PCP for u/s results.   Current birth control method: tubal ligation. Reports that her periods have not been excessively heavy but that they are extremely painful and she has daily pelvic pain as well as pain with intercourse. She has recently noticed over the past 4 months that she has a lot of trouble with her BP running very high during her periods so she has been prescribed a second medication to take if it is elevated.    She has a hx of aortic dissection, CKD stage 3 and Sjogren's syndrome. Her cardiologist and nephrologist both think that the dissection, CKD and HTN are hereditary; she does have upcoming appts set up with these physicians for her yearly exam.      Patient's last menstrual period was 2020 (approximate).    Past 6 month menstrual history:    Cycle Frequency: regular, predictable and consistent every 28 - 32 days   Menstrual cycle character: flow is typically normal and flow is typically moderately heavy   Cycle Duration: 5 - 7   Number of heavy days of flows: 1   Dysmenorrhea: severe, affecting her activities of daily living and unable to take Ibuprofen. Tylenol barely helps and she generally has a heating pad on her at all times possible to help with the pain.    PMS: mild and is not affecting her activities of daily living   Intermenstrual bleeding present: {no   Post-coital bleeding present: no     No Additional Complaints Reported    The following portions of the patient's history were reviewed and updated as appropriate:problem list, current medications, allergies, past family history, past medical history, past social history and past surgical history    Social History    Tobacco Use      Smoking status: Current Every Day Smoker        Packs/day: 1.00        Years: 24.00        Pack years: 24         "Types: Cigarettes      Smokeless tobacco: Never Used    Review of Systems   Constitutional: Negative for activity change, appetite change, diaphoresis, fatigue, unexpected weight gain and unexpected weight loss.   Respiratory: Negative for chest tightness and shortness of breath.    Cardiovascular: Negative for chest pain and palpitations.   Gastrointestinal: Negative for abdominal distention, abdominal pain, constipation, diarrhea, nausea and vomiting.   Endocrine: Negative.    Genitourinary: Positive for dyspareunia, menstrual problem, pelvic pain and pelvic pressure. Negative for amenorrhea, breast discharge, breast lump, breast pain, decreased libido, dysuria, urinary incontinence, vaginal bleeding, vaginal discharge and vaginal pain.   Musculoskeletal: Negative for myalgias.   Skin: Negative for color change, dry skin and skin lesions.   Neurological: Negative for light-headedness and headache.   Psychiatric/Behavioral: Negative for agitation, dysphoric mood, sleep disturbance, depressed mood and stress. The patient is not nervous/anxious.         Objective   /80   Ht 177.8 cm (70\")   Wt 121 kg (267 lb)   LMP 08/12/2020 (Approximate)   Breastfeeding No   BMI 38.31 kg/m²     Physical Exam   Constitutional: She is oriented to person, place, and time. Vital signs are normal. She appears well-developed and well-nourished. No distress.   Cardiovascular: Normal rate.   Pulmonary/Chest: Effort normal.   Abdominal: Soft. Bowel sounds are normal. She exhibits no distension. There is no tenderness.   Genitourinary:   Genitourinary Comments: Uterus palpable externally at appx 16 weeks size.    Pelvic exam deferred today due to patient preference; started menses today.   Neurological: She is alert and oriented to person, place, and time.   Skin: Skin is warm, dry and intact. She is not diaphoretic.   Psychiatric: She has a normal mood and affect. Her behavior is normal.   Nursing note and vitals " reviewed.        Lab Review   CBC and iron studies    Imaging   Pelvic ultrasound report     PROCEDURE: US PELVIC AND TRANSVAGINAL NONOBSTETRICAL     Clinical History: dysmenorrhea, N94.6 Dysmenorrhea, unspecified     Indication: Same as above     Comparison: None .     TECHNIQUE:      A transvaginal and transabdominal pelvic ultrasound was done,  followed by Doppler evaluation of the bilateral ovaries.     FINDINGS:      The uterus measures measures 13.6 x 7.3 x 8.7 cm. The uterine  volume is 452.16 mL     There is presence of multiple intrauterine fibroids with the  largest fibroid measuring 7.4 x 6.3 x 7.0 cm. This fibroid  appears to be pedunculated     The endometrium measures 8.0 mm., in double layer thickness.        The right ovary measures 4.5 x 1.8 x 3.3 centimeters.The right  ovarian  volume is 13.6 mL.     The left ovary measures 3.7 x 4.3 x 2.8 centimeters. The left  ovarian  volume is 23.6 mL.     The bilateral ovaries show normal color flow. The bilateral  ovaries are only seen on the transabdominal scan     There are no adnexal masses.     There is no free fluid in the pelvis.      IMPRESSION:  There is presence of multiple intrauterine fibroids with the  largest fibroid measuring 7.4 x 6.3 x 7.0 cm. This fibroid  appears to be pedunculated      Electronically signed by:  Uday Alvarez MD  7/3/2020 10:46 AM CDT  Workstation: [email or web address]         No orders of the defined types were placed in this encounter.      Diagnoses and all orders for this visit:    Uterine leiomyoma, unspecified location    Dysmenorrhea    Pelvic pain    Results of u/s reviewed with pt and discussed that her best treatment option is a hysterectomy but she is going to be a higher risk surgical candidate and she'll need to see her specialists prior to a surgical consult with OB/GYN.       Advised pt to get a surgical clearance letter from cardiology and nephrology. She is going to schedule a surgical consult with Dr. Mccallum for  early September and would like to discuss pros and cons of removing the ovaries or leaving them and abdominal vs vaginal approach due to size of uterus.      This note was electronically signed.    Heather Blanc, APRN  August 12, 2020

## 2020-08-17 DIAGNOSIS — D50.9 IRON DEFICIENCY ANEMIA, UNSPECIFIED IRON DEFICIENCY ANEMIA TYPE: ICD-10-CM

## 2020-08-17 RX ORDER — FERROUS SULFATE 325(65) MG
TABLET ORAL
Qty: 180 TABLET | Refills: 2 | Status: SHIPPED | OUTPATIENT
Start: 2020-08-17 | End: 2021-06-04

## 2020-09-03 ENCOUNTER — OFFICE VISIT (OUTPATIENT)
Dept: OBSTETRICS AND GYNECOLOGY | Facility: CLINIC | Age: 40
End: 2020-09-03

## 2020-09-03 ENCOUNTER — LAB (OUTPATIENT)
Dept: LAB | Facility: HOSPITAL | Age: 40
End: 2020-09-03

## 2020-09-03 VITALS
BODY MASS INDEX: 36.7 KG/M2 | WEIGHT: 271 LBS | HEIGHT: 72 IN | DIASTOLIC BLOOD PRESSURE: 91 MMHG | SYSTOLIC BLOOD PRESSURE: 136 MMHG

## 2020-09-03 DIAGNOSIS — R10.2 PELVIC PAIN: Primary | ICD-10-CM

## 2020-09-03 DIAGNOSIS — Z01.419 ENCOUNTER FOR ANNUAL ROUTINE GYNECOLOGICAL EXAMINATION: ICD-10-CM

## 2020-09-03 DIAGNOSIS — D25.9 UTERINE LEIOMYOMA, UNSPECIFIED LOCATION: ICD-10-CM

## 2020-09-03 DIAGNOSIS — N94.6 DYSMENORRHEA: ICD-10-CM

## 2020-09-03 PROCEDURE — 99214 OFFICE O/P EST MOD 30 MIN: CPT | Performed by: OBSTETRICS & GYNECOLOGY

## 2020-09-03 NOTE — PROGRESS NOTES
Marta Chambers is a 39 y.o. y/o female.     Chief Complaint: Pelvic pain and fibroid uterus    HPI:   39 y.o. .  Patient's last menstrual period was 2020 (approximate)..  Patient presents for evaluation secondary to pelvic pain and fibroid uterus.  She has been having pelvic pain for quite some time.  Prior to her heart surgery was also having heavy bleeding but since heart surgery bleeding has improved but does have significant pain during her periods.  Also does have pain when she is not on her period but seems to be worse during menstruation.  Denies any pain with intercourse at this time.  Patient had ultrasound which showed an enlarged uterus approximately 14 cm with at least 1 7 cm fibroid.  Patient has not had Pap smear in several years and is due at this time.  Patient is concerned about surgery given her heart surgery history and how this may affect her wellbeing.  Patient is also concerned about whether or not to leave her ovaries if she has surgery and if prolapse may be an issue.  I explained that I would likely recommend leaving her ovaries and that I would like her to see her cardiologist prior to doing surgery.  I explained the prolapse could be a possibility after the surgery but it is hard to know until the surgery is done.     Review of Systems   Constitutional: Negative for chills, fatigue and fever.   HENT: Negative for sore throat.    Eyes: Negative for visual disturbance.   Respiratory: Negative for cough, shortness of breath and wheezing.    Cardiovascular: Negative for chest pain, palpitations and leg swelling.   Gastrointestinal: Negative for abdominal pain, diarrhea, nausea and vomiting.   Genitourinary: Positive for menstrual problem and pelvic pain. Negative for dysuria, flank pain, frequency, vaginal bleeding, vaginal discharge and vaginal pain.   Neurological: Negative for syncope, light-headedness and headaches.   Psychiatric/Behavioral: Negative for dysphoric mood and  suicidal ideas. The patient is not nervous/anxious.         The following portions of the patient's history were reviewed and updated as appropriate: allergies, current medications, past family history, past medical history, past social history, past surgical history and problem list.    No Known Allergies     Prior to Admission medications    Medication Sig Start Date End Date Taking? Authorizing Provider   amLODIPine (NORVASC) 5 MG tablet TAKE ONE TABLET BY MOUTH EVERY EVENING 3/13/20   Katerina Solis MD   aspirin 81 MG chewable tablet Chew 81 mg Daily.    Marleen Bravo MD   ferrous sulfate 325 (65 FE) MG tablet TAKE ONE TABLET BY MOUTH TWICE A DAY 8/17/20   Suad Hummel APRN   hydrocortisone (ANUSOL-HC) 25 MG suppository Insert 1 suppository into the rectum 2 (Two) Times a Day As Needed for Hemorrhoids. 12/19/19   Suad Hummel APRN   losartan (COZAAR) 100 MG tablet Take 100 mg by mouth.    Marleen Bravo MD   metoprolol tartrate (LOPRESSOR) 50 MG tablet TAKE ONE TABLET BY MOUTH EVERY 12 HOURS 4/21/20   Katerina Solis MD   Multiple Vitamins-Minerals (MULTIVITAMIN ADULT PO) Take 1 tablet by mouth Daily.    Marleen Bravo MD   vitamin D (ERGOCALCIFEROL) 1.25 MG (36857 UT) capsule capsule TAKE 1 CAPSULE BY MOUTH ONCE WEEKLY 5/28/20   Suad Hummel APRN   cloNIDine (Catapres) 0.1 MG tablet Take 1 tablet by mouth 2 (Two) Times a Day. If blood pressure is elevated 6/19/20 9/3/20  Suad Hummel APRN        The patient has a family history of   Family History   Problem Relation Age of Onset   • Sudden death Father         cardiac   • Other Father         ruptured aortic aneurysm   • Aortic dissection Father    • Diabetes Other    • Hypertension Other    • Diabetes Mother    • Hypertension Mother    • COPD Mother    • Asthma Brother    • Hypertension Sister         Past Medical History:   Diagnosis Date   • Acute renal failure (CMS/HCC)    • Anemia    •  "Aneurysm, aortic (CMS/HCC)    • Aortic dissection (CMS/HCC)    • Chest pain    • GERD (gastroesophageal reflux disease)    • Hemorrhoids    • Hypertension    • Obesity    • Sjogren's disease (CMS/HCC)    • Uterine leiomyoma 2020        OB History        2    Para   2    Term   2            AB        Living   2       SAB        TAB        Ectopic        Molar        Multiple        Live Births   2                 Social History     Socioeconomic History   • Marital status: Single     Spouse name: Not on file   • Number of children: Not on file   • Years of education: Not on file   • Highest education level: Not on file   Tobacco Use   • Smoking status: Current Every Day Smoker     Packs/day: 1.00     Years: 24.00     Pack years: 24.00     Types: Cigarettes   • Smokeless tobacco: Never Used   Substance and Sexual Activity   • Alcohol use: Yes     Comment: social   • Drug use: No   • Sexual activity: Yes     Partners: Male     Birth control/protection: Surgical        Past Surgical History:   Procedure Laterality Date   • ASCENDING ARCH/HEMIARCH REPLACEMENT     • CARDIAC VALVE SURGERY     • HAND SURGERY     • OTHER SURGICAL HISTORY      aortic dissection repair   • TUBAL ABDOMINAL LIGATION          Patient Active Problem List   Diagnosis   • Aortic dissection, thoracic (CMS/HCC)   • H/O aortic arch repair   • History of aortic root repair   • CKD (chronic kidney disease)   • Essential hypertension   • Sjogren's syndrome (CMS/HCC)   • Chronic renal insufficiency, stage III (moderate) (CMS/HCC)   • Uterine leiomyoma   • Dysmenorrhea   • Pelvic pain        Documented Vitals    20 0941   BP: 136/91   Weight: 123 kg (271 lb)   Height: 182.9 cm (72\")        Body mass index is 36.75 kg/m².    Physical Exam   Constitutional: She is oriented to person, place, and time. She appears well-developed and well-nourished. No distress.   HENT:   Head: Normocephalic.   Genitourinary: Vagina normal. No vaginal " discharge found.   Genitourinary Comments: Speculum exam performed with normal-appearing cervix.  Pap smear obtained some blood on the Pap smear brush concern for possible obscuring blood.  Cervix was very friable.  Bimanual exam revealed an approximately 16-week size uterus with good mobility.  No adnexal masses palpated.   Musculoskeletal: Normal range of motion.   Neurological: She is alert and oriented to person, place, and time.   Skin: Skin is warm and dry. She is not diaphoretic.   Psychiatric: She has a normal mood and affect. Her behavior is normal. Judgment and thought content normal.   Nursing note and vitals reviewed.      Laboratory Data:   Lab Results - Last 18 Months   Lab Units 01/27/20  0255 12/19/19  0925   GLUCOSE mg/dL 80 70   BUN mg/dL 23* 18   CREATININE mg/dL 1.86* 1.73*   SODIUM mmol/L 138 141   POTASSIUM mmol/L 4.4 4.1   CHLORIDE mmol/L 106 109*   CO2 mmol/L 22.0 20.8*   CALCIUM mg/dL 9.1 8.9   TOTAL PROTEIN g/dL 7.5 7.2   ALBUMIN g/dL 3.90 3.80   ALT (SGPT) U/L 15 18   AST (SGOT) U/L 19 18   ALK PHOS U/L 42 39   BILIRUBIN mg/dL <0.2* <0.2*   GLOBULIN gm/dL 3.6 3.4   A/G RATIO g/dL 1.1 1.1   BUN / CREAT RATIO  12.4 10.4   ANION GAP mmol/L 10.0 11.2     Lab Results - Last 18 Months   Lab Units 06/19/20  1033 01/27/20  0255 12/19/19  0925 06/18/19  0947   WBC 10*3/mm3 4.88 4.55 3.54 4.10   RBC 10*6/mm3 4.30 4.19 3.68* 3.57*   HEMOGLOBIN g/dL 13.4 13.0 11.5* 8.7*   HEMATOCRIT % 40.7 39.1 35.4 30.1*   MCV fL 94.7 93.3 96.2 84.3   MCH pg 31.2 31.0 31.3 24.4*   MCHC g/dL 32.9 33.2 32.5 28.9*   RDW % 13.2 12.6 13.6 20.4*   RDW-SD fl 45.1 43.0 48.1 62.7*   MPV fL 11.6 10.7 11.5 11.8   PLATELETS 10*3/mm3 193 147 200 215     No results for input(s): HCGQUAL in the last 13261 hours.    Assessment   Patient with pelvic pain and dysmenorrhea with uterine fibroids.  Patient is currently using tubal for contraception.  Will start patient on Micronor to see if this will help with the pelvic pain.  Return to  see me in 4 weeks.  Patient going to see her cardiologist to see what their thoughts are about surgery.  Did discuss uterine artery embolization with the patient as well.  If not improving on Micronor Depo-Lupron may be a good option to try to shrink the fibroid.  To make surgery easier if that is what the patient decides.  Did discuss with patient if she wants surgery endometrial biopsy will need to be done.  No other concerns or questions at this time.     Diagnosis Plan   1. Pelvic pain     2. Dysmenorrhea     3. Uterine leiomyoma, unspecified location     4. Encounter for annual routine gynecological examination  Liquid-based Pap Smear, Screening         This document has been electronically signed by Bo Mccallum DO on September 3, 2020 10:04

## 2020-09-04 ENCOUNTER — LAB (OUTPATIENT)
Dept: LAB | Facility: HOSPITAL | Age: 40
End: 2020-09-04

## 2020-09-15 RX ORDER — AMLODIPINE BESYLATE 5 MG/1
TABLET ORAL
Qty: 30 TABLET | Refills: 1 | Status: SHIPPED | OUTPATIENT
Start: 2020-09-15 | End: 2020-09-18

## 2020-09-17 LAB
LAB AP CASE REPORT: NORMAL
PATH INTERP SPEC-IMP: NORMAL

## 2020-09-18 ENCOUNTER — OFFICE VISIT (OUTPATIENT)
Dept: CARDIOLOGY | Facility: CLINIC | Age: 40
End: 2020-09-18

## 2020-09-18 VITALS
OXYGEN SATURATION: 99 % | HEIGHT: 72 IN | BODY MASS INDEX: 37.33 KG/M2 | HEART RATE: 78 BPM | WEIGHT: 275.6 LBS | SYSTOLIC BLOOD PRESSURE: 148 MMHG | DIASTOLIC BLOOD PRESSURE: 80 MMHG

## 2020-09-18 DIAGNOSIS — I10 ESSENTIAL HYPERTENSION: ICD-10-CM

## 2020-09-18 DIAGNOSIS — I71.019 AORTIC DISSECTION, THORACIC (HCC): Primary | ICD-10-CM

## 2020-09-18 DIAGNOSIS — Z98.890 H/O AORTIC ARCH REPAIR: ICD-10-CM

## 2020-09-18 DIAGNOSIS — Z98.890 HISTORY OF AORTIC ROOT REPAIR: ICD-10-CM

## 2020-09-18 PROCEDURE — 99214 OFFICE O/P EST MOD 30 MIN: CPT | Performed by: INTERNAL MEDICINE

## 2020-09-18 RX ORDER — TRIAMCINOLONE ACETONIDE 1 MG/G
CREAM TOPICAL AS NEEDED
COMMUNITY
Start: 2020-09-14

## 2020-09-18 RX ORDER — AMLODIPINE BESYLATE 10 MG/1
10 TABLET ORAL DAILY
Qty: 90 TABLET | Refills: 3 | Status: SHIPPED | OUTPATIENT
Start: 2020-09-18 | End: 2022-01-25 | Stop reason: SDUPTHER

## 2020-09-18 NOTE — PROGRESS NOTES
Marta Chambers  39 y.o. female      1. Aortic dissection, thoracic (CMS/HCC)    2. Essential hypertension    3. H/O aortic arch repair    4. History of aortic root repair        History of Present Illness   Ms. Marta Chambers is a 39 y.o. female with a history of obesity and hypertension who presented with Nicholas type A dissection in October 2014 and underwent repair by  in [place], [place].  PROCEDURES PERFORMED:   1. Urgent repair of a type A aortic dissection using a #36 Vascutek  interposition graft to reconstruct the aortic arch as a hemiarch  reconstruction, and a total replacement of the ascending aorta.   2. Resuspension and reconstruction of the aortic root and valve using BioGlue.    Her other medical issues include CKD which is being followed closely by nephrology, obesity and recently she was evaluated by Dr. Mccallum for multiple fibroids in the uterus.  Different treatment options were discussed and patient was informed that hysterectomy could be considered if she is stable from a cardiac standpoint.    Echocardiogram performed in August 2019 showed:  · Left ventricular wall thickness is consistent with moderate concentric hypertrophy.  · Estimated EF = 65%.  · Left ventricular systolic function is normal.  · Left ventricular diastolic dysfunction (grade I) consistent with impaired relaxation.  · Mild to moderate aortic valve regurgitation is present.  · Mild dilation of the aortic root is present (3.8 cms)    She denied any chest pain or shortness of breath and her blood pressure was borderline elevated at 148/80 but as of mercury she indicates that her blood pressure is in the normal range at home.  She is aware of the importance of watching her blood pressure closely.  Clinical exam today was unremarkable with no signs of congestive heart failure.    SUBJECTIVE    No Known Allergies      Past Medical History:   Diagnosis Date   • Acute renal failure (CMS/HCC)    • Anemia    • Aneurysm, aortic  (CMS/HCC)    • Aortic dissection (CMS/HCC)    • Chest pain    • GERD (gastroesophageal reflux disease)    • Hemorrhoids    • Hypertension    • Obesity    • Sjogren's disease (CMS/HCC)    • Uterine leiomyoma 08/12/2020         Past Surgical History:   Procedure Laterality Date   • ASCENDING ARCH/HEMIARCH REPLACEMENT     • CARDIAC VALVE SURGERY     • HAND SURGERY     • OTHER SURGICAL HISTORY      aortic dissection repair   • TUBAL ABDOMINAL LIGATION           Family History   Problem Relation Age of Onset   • Sudden death Father         cardiac   • Other Father         ruptured aortic aneurysm   • Aortic dissection Father    • Diabetes Other    • Hypertension Other    • Diabetes Mother    • Hypertension Mother    • COPD Mother    • Asthma Brother    • Hypertension Sister          Social History     Socioeconomic History   • Marital status: Single     Spouse name: Not on file   • Number of children: Not on file   • Years of education: Not on file   • Highest education level: Not on file   Tobacco Use   • Smoking status: Current Every Day Smoker     Packs/day: 1.00     Years: 24.00     Pack years: 24.00     Types: Cigarettes   • Smokeless tobacco: Never Used   Substance and Sexual Activity   • Alcohol use: Yes     Comment: social   • Drug use: No   • Sexual activity: Yes     Partners: Male     Birth control/protection: Surgical         Current Outpatient Medications   Medication Sig Dispense Refill   • amLODIPine (NORVASC) 5 MG tablet TAKE ONE TABLET BY MOUTH EVERY EVENING 30 tablet 1   • aspirin 81 MG chewable tablet Chew 81 mg Daily.     • ferrous sulfate 325 (65 FE) MG tablet TAKE ONE TABLET BY MOUTH TWICE A  tablet 2   • hydrocortisone (ANUSOL-HC) 25 MG suppository Insert 1 suppository into the rectum 2 (Two) Times a Day As Needed for Hemorrhoids. 24 suppository 3   • losartan (COZAAR) 100 MG tablet Take 100 mg by mouth.     • metoprolol tartrate (LOPRESSOR) 50 MG tablet TAKE ONE TABLET BY MOUTH EVERY 12  "HOURS 90 tablet 3   • Multiple Vitamins-Minerals (MULTIVITAMIN ADULT PO) Take 1 tablet by mouth Daily.     • triamcinolone (KENALOG) 0.1 % cream      • vitamin D (ERGOCALCIFEROL) 1.25 MG (10029 UT) capsule capsule TAKE 1 CAPSULE BY MOUTH ONCE WEEKLY 4 capsule 2     No current facility-administered medications for this visit.          OBJECTIVE    /80   Pulse 78   Ht 182.9 cm (72\")   Wt 125 kg (275 lb 9.6 oz)   SpO2 99%   BMI 37.38 kg/m²         Review of Systems     Constitutional:  Denies recent weight loss, weight gain, fever or chills, no change in exercise tolerance     HENT:  Denies any hearing loss, epistaxis, hoarseness, or difficulty speaking.     Eyes: Wears eyeglasses or contact lenses     Respiratory:  Denies dyspnea with exertion,no cough, wheezing, or hemoptysis.     Cardiovascular: Negative for palpations, chest pain    Gastrointestinal:  Denies change in bowel habits, dyspepsia, ulcer disease, hematochezia, or melena.     Endocrine: Negative for cold intolerance, heat intolerance, polydipsia, polyphagia and polyuria.     Genitourinary: CKD, fibroid uterus      Musculoskeletal: Denies any history of arthritic symptoms or back problems     Skin:  Denies any change in hair or nails, rashes, or skin lesions.     Allergic/Immunologic: Negative.  Negative for environmental allergies, food allergies and immunocompromised state.     Neurological:  Denies any history of recurrent headaches, strokes, TIA, or seizure disorder.     Hematological: Denies any food allergies, seasonal allergies, bleeding disorders, or lymphadenopathy.     Psychiatric/Behavioral: Denies any history of depression, substance abuse, or change in cognitive function.         Physical Exam     Constitutional: Cooperative, alert and oriented,  in no acute distress.     HENT:   Head: Normocephalic, normal hair patterns, no masses or tenderness.  Ears, Nose, and Throat: No gross abnormalities. No pallor or cyanosis. Dentition good. "   Eyes: EOMS intact, PERRL, conjunctivae and lids unremarkable. Fundoscopic exam and visual fields not performed.   Neck: No palpable masses or adenopathy, no thyromegaly, no JVD, carotid pulses are full and equal bilaterally and without  Bruits.     Cardiovascular: Regular rhythm, S1 and S2 normal, no S3 or S4. No murmurs, gallops, or rubs detected.     Pulmonary/Chest: Chest: normal symmetry,  normal respiratory excursion, no intercostal retraction, no use of accessory muscles.            Pulmonary: Normal breath sounds. No rales or ronchi.    Abdominal: Abdomen soft, bowel sounds normoactive, no masses, no hepatosplenomegaly, non-tender, no bruits.     Musculoskeletal: No deformities, clubbing, cyanosis, erythema, or edema observed.     Neurological: No gross motor or sensory deficits noted, affect appropriate, oriented to time, person, place.     Skin: Warm and dry to the touch, no apparent skin lesions or masses noted.     Psychiatric: She has a normal mood and affect. Her behavior is normal. Judgment and thought content normal.         Procedures      Lab Results   Component Value Date    WBC 4.88 06/19/2020    HGB 13.4 06/19/2020    HCT 40.7 06/19/2020    MCV 94.7 06/19/2020     06/19/2020     Lab Results   Component Value Date    GLUCOSE 80 01/27/2020    BUN 23 (H) 01/27/2020    CREATININE 1.86 (H) 01/27/2020    EGFRIFAFRI 36 (L) 01/27/2020    BCR 12.4 01/27/2020    CO2 22.0 01/27/2020    CALCIUM 9.1 01/27/2020    ALBUMIN 3.90 01/27/2020    AST 19 01/27/2020    ALT 15 01/27/2020     Lab Results   Component Value Date    CHOL 157 06/29/2018    CHOL 159 07/06/2017     Lab Results   Component Value Date    TRIG 84 06/29/2018    TRIG 163 07/06/2017    TRIG 77 10/22/2014     Lab Results   Component Value Date    HDL 48 (L) 06/29/2018    HDL 43 (L) 07/06/2017    HDL 44 10/22/2014     No components found for: LDLCALC  Lab Results   Component Value Date    LDL 80 06/29/2018    LDL 83 07/06/2017    LDL 84  10/22/2014     No results found for: HDLLDLRATIO  No components found for: CHOLHDL  Lab Results   Component Value Date    HGBA1C 5.3 10/23/2014     Lab Results   Component Value Date    TSH 1.450 06/18/2019           ASSESSMENT AND PLAN  Ms. Chambers is stable with regards to her heart with no evidence of cardiac symptoms at the present time.  For optimization of blood pressure I have increased the dose of amlodipine to 10 mg daily and losartan and metoprolol tartrate have been continued.  Antiplatelet therapy with aspirin has been continued.     Based on the information I have she will be low to moderate risk for perioperative cardiac events should she need hysterectomy.  Antihypertensive medications will be continued during the perioperative period.    Marta was seen today for follow-up.    Diagnoses and all orders for this visit:    Aortic dissection, thoracic (CMS/HCC)    Essential hypertension    H/O aortic arch repair    History of aortic root repair        Patient's Body mass index is 37.38 kg/m². BMI is above normal parameters. Recommendations include: exercise counseling and nutrition counseling.  Patient is a non-smoker.    Katerina Solis MD  9/18/2020  10:41 CDT

## 2020-10-01 ENCOUNTER — OFFICE VISIT (OUTPATIENT)
Dept: OBSTETRICS AND GYNECOLOGY | Facility: CLINIC | Age: 40
End: 2020-10-01

## 2020-10-01 VITALS
BODY MASS INDEX: 36.44 KG/M2 | SYSTOLIC BLOOD PRESSURE: 138 MMHG | WEIGHT: 269 LBS | HEIGHT: 72 IN | DIASTOLIC BLOOD PRESSURE: 86 MMHG

## 2020-10-01 DIAGNOSIS — R10.2 PELVIC PAIN: Primary | ICD-10-CM

## 2020-10-01 DIAGNOSIS — N94.6 DYSMENORRHEA: ICD-10-CM

## 2020-10-01 DIAGNOSIS — D25.9 UTERINE LEIOMYOMA, UNSPECIFIED LOCATION: ICD-10-CM

## 2020-10-01 PROCEDURE — 99213 OFFICE O/P EST LOW 20 MIN: CPT | Performed by: OBSTETRICS & GYNECOLOGY

## 2020-10-01 RX ORDER — ACETAMINOPHEN AND CODEINE PHOSPHATE 120; 12 MG/5ML; MG/5ML
1 SOLUTION ORAL DAILY
Qty: 28 TABLET | Refills: 12 | Status: SHIPPED | OUTPATIENT
Start: 2020-10-01 | End: 2021-06-04

## 2020-10-01 NOTE — PROGRESS NOTES
Marta Chambers is a 39 y.o. y/o female.     Chief Complaint: Follow-up on fibroid uterus    HPI:   39 y.o. .  No LMP recorded..  Patient presents for follow-up on fibroid uterus.  Still is having some pain and discomfort but no significant bleeding.  Did talk to cardiology who felt like she could undergo surgery.  Patient would like to hold off on surgery for now as she does not want to have surgery during the holidays.  Had previously commended [facility] however forgot to place prescription.  Will place today.  Patient is thinking she would like to undergo hysterectomy but wait till next year.  I felt like this was a reasonable plan.  Given that the patient is stable and not having other problems.  Will likely get repeat ultrasound when patient returns to see me to see if uterus is changed at all.  Patient is considering supracervical hysterectomy and would like to keep both ovaries.    Note from last visit: Patient presents for evaluation secondary to pelvic pain and fibroid uterus.  She has been having pelvic pain for quite some time.  Prior to her heart surgery was also having heavy bleeding but since heart surgery bleeding has improved but does have significant pain during her periods.  Also does have pain when she is not on her period but seems to be worse during menstruation.  Denies any pain with intercourse at this time.  Patient had ultrasound which showed an enlarged uterus approximately 14 cm with at least 1 7 cm fibroid.  Patient has not had Pap smear in several years and is due at this time.  Patient is concerned about surgery given her heart surgery history and how this may affect her wellbeing.  Patient is also concerned about whether or not to leave her ovaries if she has surgery and if prolapse may be an issue.  I explained that I would likely recommend leaving her ovaries and that I would like her to see her cardiologist prior to doing surgery.  I explained the prolapse could be a  possibility after the surgery but it is hard to know until the surgery is done.     Review of Systems   Constitutional: Negative for chills, fatigue and fever.   HENT: Negative for sore throat.    Eyes: Negative for visual disturbance.   Respiratory: Negative for cough, shortness of breath and wheezing.    Cardiovascular: Negative for chest pain, palpitations and leg swelling.   Gastrointestinal: Negative for abdominal pain, diarrhea, nausea and vomiting.   Genitourinary: Positive for pelvic pain. Negative for dysuria, flank pain, frequency, vaginal bleeding, vaginal discharge and vaginal pain.   Neurological: Negative for syncope, light-headedness and headaches.   Psychiatric/Behavioral: Negative for dysphoric mood and suicidal ideas. The patient is not nervous/anxious.         The following portions of the patient's history were reviewed and updated as appropriate: allergies, current medications, past family history, past medical history, past social history, past surgical history and problem list.    No Known Allergies     Prior to Admission medications    Medication Sig Start Date End Date Taking? Authorizing Provider   amLODIPine (NORVASC) 10 MG tablet Take 1 tablet by mouth Daily. 9/18/20  Yes Katerina Solis MD   aspirin 81 MG chewable tablet Chew 81 mg Daily.   Yes Marleen Bravo MD   ferrous sulfate 325 (65 FE) MG tablet TAKE ONE TABLET BY MOUTH TWICE A DAY 8/17/20  Yes Suad Hummel APRN   hydrocortisone (ANUSOL-HC) 25 MG suppository Insert 1 suppository into the rectum 2 (Two) Times a Day As Needed for Hemorrhoids. 12/19/19  Yes Suad Hummel APRN   losartan (COZAAR) 100 MG tablet Take 100 mg by mouth.   Yes Marleen Bravo MD   metoprolol tartrate (LOPRESSOR) 50 MG tablet TAKE ONE TABLET BY MOUTH EVERY 12 HOURS 4/21/20  Yes Katerina Solis MD   Multiple Vitamins-Minerals (MULTIVITAMIN ADULT PO) Take 1 tablet by mouth Daily.   Yes Marleen Bravo MD    triamcinolone (KENALOG) 0.1 % cream  20  Yes Provider, MD Marleen   vitamin D (ERGOCALCIFEROL) 1.25 MG (47797 UT) capsule capsule TAKE 1 CAPSULE BY MOUTH ONCE WEEKLY 20  Yes Suad Hummel APRN        The patient has a family history of   Family History   Problem Relation Age of Onset   • Sudden death Father         cardiac   • Other Father         ruptured aortic aneurysm   • Aortic dissection Father    • Diabetes Other    • Hypertension Other    • Diabetes Mother    • Hypertension Mother    • COPD Mother    • Asthma Brother    • Hypertension Sister         Past Medical History:   Diagnosis Date   • Acute renal failure (CMS/HCC)    • Anemia    • Aneurysm, aortic (CMS/HCC)    • Aortic dissection (CMS/HCC)    • Chest pain    • GERD (gastroesophageal reflux disease)    • Hemorrhoids    • Hypertension    • Obesity    • Sjogren's disease (CMS/HCC)    • Uterine leiomyoma 2020        OB History        2    Para   2    Term   2            AB        Living   2       SAB        TAB        Ectopic        Molar        Multiple        Live Births   2                 Social History     Socioeconomic History   • Marital status: Single     Spouse name: Not on file   • Number of children: Not on file   • Years of education: Not on file   • Highest education level: Not on file   Tobacco Use   • Smoking status: Current Every Day Smoker     Packs/day: 1.00     Years: 24.00     Pack years: 24.00     Types: Cigarettes   • Smokeless tobacco: Never Used   Substance and Sexual Activity   • Alcohol use: Yes     Comment: social   • Drug use: No   • Sexual activity: Yes     Partners: Male     Birth control/protection: Surgical        Past Surgical History:   Procedure Laterality Date   • ASCENDING ARCH/HEMIARCH REPLACEMENT     • CARDIAC VALVE SURGERY     • HAND SURGERY     • OTHER SURGICAL HISTORY      aortic dissection repair   • TUBAL ABDOMINAL LIGATION          Patient Active Problem List   Diagnosis  "  • Aortic dissection, thoracic (CMS/HCC)   • H/O aortic arch repair   • History of aortic root repair   • CKD (chronic kidney disease)   • Essential hypertension   • Sjogren's syndrome (CMS/HCC)   • Chronic renal insufficiency, stage III (moderate)   • Uterine leiomyoma   • Dysmenorrhea   • Pelvic pain        Documented Vitals    10/01/20 0951   BP: 138/86   Weight: 122 kg (269 lb)   Height: 182.9 cm (72\")        Body mass index is 36.48 kg/m².    Physical Exam  Constitutional:       General: She is not in acute distress.     Appearance: Normal appearance. She is not ill-appearing, toxic-appearing or diaphoretic.   HENT:      Head: Normocephalic.   Musculoskeletal: Normal range of motion.   Skin:     General: Skin is warm and dry.   Neurological:      General: No focal deficit present.      Mental Status: She is alert and oriented to person, place, and time.   Psychiatric:         Mood and Affect: Mood normal.         Behavior: Behavior normal.         Thought Content: Thought content normal.         Judgment: Judgment normal.         Laboratory Data:   Lab Results - Last 18 Months   Lab Units 01/27/20  0255 12/19/19  0925   GLUCOSE mg/dL 80 70   BUN mg/dL 23* 18   CREATININE mg/dL 1.86* 1.73*   SODIUM mmol/L 138 141   POTASSIUM mmol/L 4.4 4.1   CHLORIDE mmol/L 106 109*   CO2 mmol/L 22.0 20.8*   CALCIUM mg/dL 9.1 8.9   TOTAL PROTEIN g/dL 7.5 7.2   ALBUMIN g/dL 3.90 3.80   ALT (SGPT) U/L 15 18   AST (SGOT) U/L 19 18   ALK PHOS U/L 42 39   BILIRUBIN mg/dL <0.2* <0.2*   GLOBULIN gm/dL 3.6 3.4   A/G RATIO g/dL 1.1 1.1   BUN / CREAT RATIO  12.4 10.4   ANION GAP mmol/L 10.0 11.2     Lab Results - Last 18 Months   Lab Units 06/19/20  1033 01/27/20  0255 12/19/19  0925 06/18/19  0947   WBC 10*3/mm3 4.88 4.55 3.54 4.10   RBC 10*6/mm3 4.30 4.19 3.68* 3.57*   HEMOGLOBIN g/dL 13.4 13.0 11.5* 8.7*   HEMATOCRIT % 40.7 39.1 35.4 30.1*   MCV fL 94.7 93.3 96.2 84.3   MCH pg 31.2 31.0 31.3 24.4*   MCHC g/dL 32.9 33.2 32.5 28.9* "   RDW % 13.2 12.6 13.6 20.4*   RDW-SD fl 45.1 43.0 48.1 62.7*   MPV fL 11.6 10.7 11.5 11.8   PLATELETS 10*3/mm3 193 147 200 215     No results for input(s): HCGQUAL in the last 08504 hours.    Assessment   Patient with fibroid uterus heavy bleeding and pelvic pain.  Is considering surgery but would like to wait till next year.  We will have patient return to see me in January for follow-up will get repeat ultrasound at that time and will likely schedule for total abdominal hysterectomy.  Patient is considering supracervical hysterectomy and would like to leave both ovaries.  Patient return to see me sooner as needed.    No diagnosis found.            This document has been electronically signed by Bo Mccallum DO on October 1, 2020 10:07 CDT

## 2020-10-14 DIAGNOSIS — E55.9 VITAMIN D DEFICIENCY: ICD-10-CM

## 2020-10-15 RX ORDER — ERGOCALCIFEROL 1.25 MG/1
CAPSULE ORAL
Qty: 4 CAPSULE | Refills: 1 | Status: SHIPPED | OUTPATIENT
Start: 2020-10-15 | End: 2020-12-14

## 2020-11-17 DIAGNOSIS — I10 ESSENTIAL HYPERTENSION: Primary | ICD-10-CM

## 2020-11-17 RX ORDER — METOPROLOL TARTRATE 50 MG/1
50 TABLET, FILM COATED ORAL EVERY 12 HOURS
Qty: 90 TABLET | Refills: 3 | Status: SHIPPED | OUTPATIENT
Start: 2020-11-17 | End: 2021-05-14

## 2020-11-17 RX ORDER — METOPROLOL TARTRATE 50 MG/1
TABLET, FILM COATED ORAL
Qty: 60 TABLET | Refills: 2 | OUTPATIENT
Start: 2020-11-17

## 2020-12-12 DIAGNOSIS — E55.9 VITAMIN D DEFICIENCY: ICD-10-CM

## 2020-12-14 RX ORDER — ERGOCALCIFEROL 1.25 MG/1
CAPSULE ORAL
Qty: 4 CAPSULE | Refills: 0 | Status: SHIPPED | OUTPATIENT
Start: 2020-12-14 | End: 2021-01-13

## 2020-12-21 ENCOUNTER — OFFICE VISIT (OUTPATIENT)
Dept: FAMILY MEDICINE CLINIC | Facility: CLINIC | Age: 40
End: 2020-12-21

## 2020-12-21 VITALS
HEART RATE: 60 BPM | BODY MASS INDEX: 37.52 KG/M2 | RESPIRATION RATE: 20 BRPM | TEMPERATURE: 96.2 F | DIASTOLIC BLOOD PRESSURE: 85 MMHG | HEIGHT: 72 IN | OXYGEN SATURATION: 100 % | SYSTOLIC BLOOD PRESSURE: 156 MMHG | WEIGHT: 277 LBS

## 2020-12-21 DIAGNOSIS — I10 ESSENTIAL HYPERTENSION: Primary | ICD-10-CM

## 2020-12-21 PROCEDURE — 99213 OFFICE O/P EST LOW 20 MIN: CPT | Performed by: NURSE PRACTITIONER

## 2020-12-29 NOTE — PATIENT INSTRUCTIONS
Calorie Counting for Weight Loss  Calories are units of energy. Your body needs a certain amount of calories from food to keep you going throughout the day. When you eat more calories than your body needs, your body stores the extra calories as fat. When you eat fewer calories than your body needs, your body burns fat to get the energy it needs.  Calorie counting means keeping track of how many calories you eat and drink each day. Calorie counting can be helpful if you need to lose weight. If you make sure to eat fewer calories than your body needs, you should lose weight. Ask your health care provider what a healthy weight is for you.  For calorie counting to work, you will need to eat the right number of calories in a day in order to lose a healthy amount of weight per week. A dietitian can help you determine how many calories you need in a day and will give you suggestions on how to reach your calorie goal.  · A healthy amount of weight to lose per week is usually 1-2 lb (0.5-0.9 kg). This usually means that your daily calorie intake should be reduced by 500-750 calories.  · Eating 1,200 - 1,500 calories per day can help most women lose weight.  · Eating 1,500 - 1,800 calories per day can help most men lose weight.  What is my plan?  My goal is to have __________ calories per day.  If I have this many calories per day, I should lose around __________ pounds per week.  What do I need to know about calorie counting?  In order to meet your daily calorie goal, you will need to:  · Find out how many calories are in each food you would like to eat. Try to do this before you eat.  · Decide how much of the food you plan to eat.  · Write down what you ate and how many calories it had. Doing this is called keeping a food log.  To successfully lose weight, it is important to balance calorie counting with a healthy lifestyle that includes regular activity. Aim for 150 minutes of moderate exercise (such as walking) or 75  minutes of vigorous exercise (such as running) each week.  Where do I find calorie information?    The number of calories in a food can be found on a Nutrition Facts label. If a food does not have a Nutrition Facts label, try to look up the calories online or ask your dietitian for help.  Remember that calories are listed per serving. If you choose to have more than one serving of a food, you will have to multiply the calories per serving by the amount of servings you plan to eat. For example, the label on a package of bread might say that a serving size is 1 slice and that there are 90 calories in a serving. If you eat 1 slice, you will have eaten 90 calories. If you eat 2 slices, you will have eaten 180 calories.  How do I keep a food log?  Immediately after each meal, record the following information in your food log:  · What you ate. Don't forget to include toppings, sauces, and other extras on the food.  · How much you ate. This can be measured in cups, ounces, or number of items.  · How many calories each food and drink had.  · The total number of calories in the meal.  Keep your food log near you, such as in a small notebook in your pocket, or use a mobile melvin or website. Some programs will calculate calories for you and show you how many calories you have left for the day to meet your goal.  What are some calorie counting tips?    · Use your calories on foods and drinks that will fill you up and not leave you hungry:  ? Some examples of foods that fill you up are nuts and nut butters, vegetables, lean proteins, and high-fiber foods like whole grains. High-fiber foods are foods with more than 5 g fiber per serving.  ? Drinks such as sodas, specialty coffee drinks, alcohol, and juices have a lot of calories, yet do not fill you up.  · Eat nutritious foods and avoid empty calories. Empty calories are calories you get from foods or beverages that do not have many vitamins or protein, such as candy, sweets, and  "soda. It is better to have a nutritious high-calorie food (such as an avocado) than a food with few nutrients (such as a bag of chips).  · Know how many calories are in the foods you eat most often. This will help you calculate calorie counts faster.  · Pay attention to calories in drinks. Low-calorie drinks include water and unsweetened drinks.  · Pay attention to nutrition labels for \"low fat\" or \"fat free\" foods. These foods sometimes have the same amount of calories or more calories than the full fat versions. They also often have added sugar, starch, or salt, to make up for flavor that was removed with the fat.  · Find a way of tracking calories that works for you. Get creative. Try different apps or programs if writing down calories does not work for you.  What are some portion control tips?  · Know how many calories are in a serving. This will help you know how many servings of a certain food you can have.  · Use a measuring cup to measure serving sizes. You could also try weighing out portions on a kitchen scale. With time, you will be able to estimate serving sizes for some foods.  · Take some time to put servings of different foods on your favorite plates, bowls, and cups so you know what a serving looks like.  · Try not to eat straight from a bag or box. Doing this can lead to overeating. Put the amount you would like to eat in a cup or on a plate to make sure you are eating the right portion.  · Use smaller plates, glasses, and bowls to prevent overeating.  · Try not to multitask (for example, watch TV or use your computer) while eating. If it is time to eat, sit down at a table and enjoy your food. This will help you to know when you are full. It will also help you to be aware of what you are eating and how much you are eating.  What are tips for following this plan?  Reading food labels  · Check the calorie count compared to the serving size. The serving size may be smaller than what you are used to " "eating.  · Check the source of the calories. Make sure the food you are eating is high in vitamins and protein and low in saturated and trans fats.  Shopping  · Read nutrition labels while you shop. This will help you make healthy decisions before you decide to purchase your food.  · Make a grocery list and stick to it.  Cooking  · Try to cook your favorite foods in a healthier way. For example, try baking instead of frying.  · Use low-fat dairy products.  Meal planning  · Use more fruits and vegetables. Half of your plate should be fruits and vegetables.  · Include lean proteins like poultry and fish.  How do I count calories when eating out?  · Ask for smaller portion sizes.  · Consider sharing an entree and sides instead of getting your own entree.  · If you get your own entree, eat only half. Ask for a box at the beginning of your meal and put the rest of your entree in it so you are not tempted to eat it.  · If calories are listed on the menu, choose the lower calorie options.  · Choose dishes that include vegetables, fruits, whole grains, low-fat dairy products, and lean protein.  · Choose items that are boiled, broiled, grilled, or steamed. Stay away from items that are buttered, battered, fried, or served with cream sauce. Items labeled \"crispy\" are usually fried, unless stated otherwise.  · Choose water, low-fat milk, unsweetened iced tea, or other drinks without added sugar. If you want an alcoholic beverage, choose a lower calorie option such as a glass of wine or light beer.  · Ask for dressings, sauces, and syrups on the side. These are usually high in calories, so you should limit the amount you eat.  · If you want a salad, choose a garden salad and ask for grilled meats. Avoid extra toppings like wynne, cheese, or fried items. Ask for the dressing on the side, or ask for olive oil and vinegar or lemon to use as dressing.  · Estimate how many servings of a food you are given. For example, a serving of " cooked rice is ½ cup or about the size of half a baseball. Knowing serving sizes will help you be aware of how much food you are eating at restaurants. The list below tells you how big or small some common portion sizes are based on everyday objects:  ? 1 oz--4 stacked dice.  ? 3 oz--1 deck of cards.  ? 1 tsp--1 die.  ? 1 Tbsp--½ a ping-pong ball.  ? 2 Tbsp--1 ping-pong ball.  ? ½ cup--½ baseball.  ? 1 cup--1 baseball.  Summary  · Calorie counting means keeping track of how many calories you eat and drink each day. If you eat fewer calories than your body needs, you should lose weight.  · A healthy amount of weight to lose per week is usually 1-2 lb (0.5-0.9 kg). This usually means reducing your daily calorie intake by 500-750 calories.  · The number of calories in a food can be found on a Nutrition Facts label. If a food does not have a Nutrition Facts label, try to look up the calories online or ask your dietitian for help.  · Use your calories on foods and drinks that will fill you up, and not on foods and drinks that will leave you hungry.  · Use smaller plates, glasses, and bowls to prevent overeating.  This information is not intended to replace advice given to you by your health care provider. Make sure you discuss any questions you have with your health care provider.  Document Revised: 09/06/2019 Document Reviewed: 11/17/2017  [employer] Patient Education © 2020 [employer]      Exercising to Lose Weight  Exercise is structured, repetitive physical activity to improve fitness and health. Getting regular exercise is important for everyone. It is especially important if you are overweight. Being overweight increases your risk of heart disease, stroke, diabetes, high blood pressure, and several types of cancer. Reducing your calorie intake and exercising can help you lose weight.  Exercise is usually categorized as moderate or vigorous intensity. To lose weight, most people need to do a certain amount of  moderate-intensity or vigorous-intensity exercise each week.  Moderate-intensity exercise    Moderate-intensity exercise is any activity that gets you moving enough to burn at least three times more energy (calories) than if you were sitting.  Examples of moderate exercise include:  · Walking a mile in 15 minutes.  · Doing light yard work.  · Biking at an easy pace.  Most people should get at least 150 minutes (2 hours and 30 minutes) a week of moderate-intensity exercise to maintain their body weight.  Vigorous-intensity exercise  Vigorous-intensity exercise is any activity that gets you moving enough to burn at least six times more calories than if you were sitting. When you exercise at this intensity, you should be working hard enough that you are not able to carry on a conversation.  Examples of vigorous exercise include:  · Running.  · Playing a team sport, such as football, basketball, and soccer.  · Jumping rope.  Most people should get at least 75 minutes (1 hour and 15 minutes) a week of vigorous-intensity exercise to maintain their body weight.  How can exercise affect me?  When you exercise enough to burn more calories than you eat, you lose weight. Exercise also reduces body fat and builds muscle. The more muscle you have, the more calories you burn. Exercise also:  · Improves mood.  · Reduces stress and tension.  · Improves your overall fitness, flexibility, and endurance.  · Increases bone strength.  The amount of exercise you need to lose weight depends on:  · Your age.  · The type of exercise.  · Any health conditions you have.  · Your overall physical ability.  Talk to your health care provider about how much exercise you need and what types of activities are safe for you.  What actions can I take to lose weight?  Nutrition    · Make changes to your diet as told by your health care provider or diet and nutrition specialist (dietitian). This may include:  ? Eating fewer calories.  ? Eating more  protein.  ? Eating less unhealthy fats.  ? Eating a diet that includes fresh fruits and vegetables, whole grains, low-fat dairy products, and lean protein.  ? Avoiding foods with added fat, salt, and sugar.  · Drink plenty of water while you exercise to prevent dehydration or heat stroke.  Activity  · Choose an activity that you enjoy and set realistic goals. Your health care provider can help you make an exercise plan that works for you.  · Exercise at a moderate or vigorous intensity most days of the week.  ? The intensity of exercise may vary from person to person. You can tell how intense a workout is for you by paying attention to your breathing and heartbeat. Most people will notice their breathing and heartbeat get faster with more intense exercise.  · Do resistance training twice each week, such as:  ? Push-ups.  ? Sit-ups.  ? Lifting weights.  ? Using resistance bands.  · Getting short amounts of exercise can be just as helpful as long structured periods of exercise. If you have trouble finding time to exercise, try to include exercise in your daily routine.  ? Get up, stretch, and walk around every 30 minutes throughout the day.  ? Go for a walk during your lunch break.  ? Park your car farther away from your destination.  ? If you take public transportation, get off one stop early and walk the rest of the way.  ? Make phone calls while standing up and walking around.  ? Take the stairs instead of elevators or escalators.  · Wear comfortable clothes and shoes with good support.  · Do not exercise so much that you hurt yourself, feel dizzy, or get very short of breath.  Where to find more information  · [place] Department of Health and Human Services: www.hhs.gov  · Centers for Disease Control and Prevention (CDC): www.cdc.gov  Contact a health care provider:  · Before starting a new exercise program.  · If you have questions or concerns about your weight.  · If you have a medical problem that keeps you from  exercising.  Get help right away if you have any of the following while exercising:  · Injury.  · Dizziness.  · Difficulty breathing or shortness of breath that does not go away when you stop exercising.  · Chest pain.  · Rapid heartbeat.  Summary  · Being overweight increases your risk of heart disease, stroke, diabetes, high blood pressure, and several types of cancer.  · Losing weight happens when you burn more calories than you eat.  · Reducing the amount of calories you eat in addition to getting regular moderate or vigorous exercise each week helps you lose weight.  This information is not intended to replace advice given to you by your health care provider. Make sure you discuss any questions you have with your health care provider.  Document Revised: 12/31/2018 Document Reviewed: 12/31/2018  [employer] Patient Education © 2020 [employer]

## 2020-12-29 NOTE — PROGRESS NOTES
Subjective   Marta Chambers is a 40 y.o. female.     Marta Chambers age 40 comes in today chief complaint of needing a recheck on her blood pressure.  She has history of hypertension at this time she takes amlodipine and losartan for control of her blood pressure along with metoprolol.  She has no chest pain shortness of breath and overall feels well.    Hypertension  This is a chronic problem. The current episode started more than 1 year ago. The problem is unchanged. The problem is controlled. Pertinent negatives include no anxiety, blurred vision, chest pain, headaches, malaise/fatigue, neck pain, orthopnea, palpitations, peripheral edema, PND, shortness of breath or sweats. There are no associated agents to hypertension. Risk factors for coronary artery disease include sedentary lifestyle, smoking/tobacco exposure and obesity. Past treatments include angiotensin blockers and calcium channel blockers. Current antihypertension treatment includes angiotensin blockers and calcium channel blockers. The current treatment provides significant improvement. There are no compliance problems.  There is no history of angina, kidney disease, CAD/MI, CVA, heart failure, left ventricular hypertrophy, PVD or retinopathy.        The following portions of the patient's history were reviewed and updated as appropriate: allergies, current medications, past family history, past medical history, past social history, past surgical history and problem list.    Review of Systems   Constitutional: Negative.  Negative for malaise/fatigue.   HENT: Negative.    Eyes: Negative.  Negative for blurred vision.   Respiratory: Negative.  Negative for shortness of breath.    Cardiovascular: Negative.  Negative for chest pain, palpitations, orthopnea and PND.   Gastrointestinal: Negative.    Endocrine: Negative.    Genitourinary: Negative.    Musculoskeletal: Negative.  Negative for neck pain.   Skin: Negative.    Allergic/Immunologic: Negative.     Neurological: Negative.    Hematological: Negative.    Psychiatric/Behavioral: Negative.        Objective   Physical Exam  Vitals signs and nursing note reviewed.   Constitutional:       General: She is not in acute distress.     Appearance: She is well-developed.   HENT:      Head: Normocephalic and atraumatic.      Right Ear: External ear normal.      Left Ear: External ear normal.      Nose: Nose normal.      Mouth/Throat:      Pharynx: No oropharyngeal exudate.   Eyes:      Pupils: Pupils are equal, round, and reactive to light.   Neck:      Musculoskeletal: Normal range of motion and neck supple.      Thyroid: No thyromegaly.   Cardiovascular:      Rate and Rhythm: Normal rate and regular rhythm.      Heart sounds: Normal heart sounds. No murmur. No friction rub.   Pulmonary:      Effort: Pulmonary effort is normal. No respiratory distress.      Breath sounds: Normal breath sounds. No wheezing or rales.   Abdominal:      Palpations: Abdomen is soft.   Musculoskeletal: Normal range of motion.   Skin:     General: Skin is warm and dry.   Neurological:      Mental Status: She is alert and oriented to person, place, and time.   Psychiatric:         Thought Content: Thought content normal.           Assessment/Plan   Diagnoses and all orders for this visit:    1. Essential hypertension (Primary)  Comments:  No Changes in medication she needs no refills today.      Was noted to be 37.6 which is considered obese.  Diet and exercise information will be provided the patient.         Answers for HPI/ROS submitted by the patient on 12/18/2020   What is the primary reason for your visit?: Other  Please describe your symptoms.: Check up  Have you had these symptoms before?: No  How long have you been having these symptoms?: 1-4 days

## 2021-01-07 ENCOUNTER — OFFICE VISIT (OUTPATIENT)
Dept: OBSTETRICS AND GYNECOLOGY | Facility: CLINIC | Age: 41
End: 2021-01-07

## 2021-01-07 VITALS
SYSTOLIC BLOOD PRESSURE: 142 MMHG | WEIGHT: 271 LBS | DIASTOLIC BLOOD PRESSURE: 84 MMHG | BODY MASS INDEX: 36.7 KG/M2 | HEIGHT: 72 IN

## 2021-01-07 DIAGNOSIS — R10.2 PELVIC PAIN: ICD-10-CM

## 2021-01-07 DIAGNOSIS — N94.6 DYSMENORRHEA: Primary | ICD-10-CM

## 2021-01-07 DIAGNOSIS — D25.1 INTRAMURAL LEIOMYOMA OF UTERUS: ICD-10-CM

## 2021-01-07 PROCEDURE — 99213 OFFICE O/P EST LOW 20 MIN: CPT | Performed by: OBSTETRICS & GYNECOLOGY

## 2021-01-07 NOTE — PROGRESS NOTES
Marta Chambers is a 40 y.o. y/o female.     Chief Complaint: Fibroid uterus    HPI:   40 y.o. .  Patient's last menstrual period was 2021 (exact date)..  Patient presents for follow-up on fibroid uterus.  Patient had wanted to wait for surgery till after the first of the year, but is now thinking she needs to wait for an additional few months.  One of her children is going to have surgery and she needs to be able to take care of him.  Other than fullness in her pelvis she is not really having any other symptoms from these fibroids although fibroids are very large.  Plan to have patient get repeat ultrasound to evaluate for any change in the size of the fibroids and then she will continue to consider hysterectomy.  Patient had been started on Micronor however her nephrologist did not feel comfortable with her on this given her kidney problems.     Review of Systems   Constitutional: Negative for chills, fatigue and fever.   HENT: Negative for sore throat.    Eyes: Negative for visual disturbance.   Respiratory: Negative for cough, shortness of breath and wheezing.    Cardiovascular: Negative for chest pain, palpitations and leg swelling.   Gastrointestinal: Negative for abdominal pain, diarrhea, nausea and vomiting.   Genitourinary: Positive for pelvic pain. Negative for dysuria, flank pain, frequency, menstrual problem, vaginal bleeding, vaginal discharge and vaginal pain.   Neurological: Negative for syncope, light-headedness and headaches.   Psychiatric/Behavioral: Negative for dysphoric mood and suicidal ideas. The patient is not nervous/anxious.         The following portions of the patient's history were reviewed and updated as appropriate: allergies, current medications, past family history, past medical history, past social history, past surgical history and problem list.    No Known Allergies     Prior to Admission medications    Medication Sig Start Date End Date Taking? Authorizing  Provider   amLODIPine (NORVASC) 10 MG tablet Take 1 tablet by mouth Daily. 20  Yes Katerina Solis MD   aspirin 81 MG chewable tablet Chew 81 mg Daily.   Yes ProviderMarleen MD   ferrous sulfate 325 (65 FE) MG tablet TAKE ONE TABLET BY MOUTH TWICE A DAY 20  Yes Suad Hummel APRN   hydrocortisone (ANUSOL-HC) 25 MG suppository Insert 1 suppository into the rectum 2 (Two) Times a Day As Needed for Hemorrhoids. 19  Yes Suad Hummel APRN   losartan (COZAAR) 100 MG tablet Take 100 mg by mouth.   Yes ProviderMarleen MD   metoprolol tartrate (LOPRESSOR) 50 MG tablet Take 1 tablet by mouth Every 12 (Twelve) Hours. 20  Yes Katerina Solis MD   Multiple Vitamins-Minerals (MULTIVITAMIN ADULT PO) Take 1 tablet by mouth Daily.   Yes ProviderMarleen MD   triamcinolone (KENALOG) 0.1 % cream  20  Yes ProviderMarleen MD   vitamin D (ERGOCALCIFEROL) 1.25 MG (94925 UT) capsule capsule TAKE ONE CAPSULE BY MOUTH ONCE WEEKLY 20  Yes Suad Hummel APRN   norethindrone (MICRONOR) 0.35 MG tablet Take 1 tablet by mouth Daily. 10/1/20 10/1/21  Bo Mccallum, DO        The patient has a family history of   Family History   Problem Relation Age of Onset   • Sudden death Father         cardiac   • Other Father         ruptured aortic aneurysm   • Aortic dissection Father    • Diabetes Other    • Hypertension Other    • Diabetes Mother    • Hypertension Mother    • COPD Mother    • Asthma Brother    • Hypertension Sister         Past Medical History:   Diagnosis Date   • Acute renal failure (CMS/HCC)    • Anemia    • Aneurysm, aortic (CMS/HCC)    • Aortic dissection (CMS/HCC)    • Chest pain    • GERD (gastroesophageal reflux disease)    • Hemorrhoids    • Hypertension    • Obesity    • Sjogren's disease (CMS/HCC)    • Uterine leiomyoma 2020        OB History        2    Para   2    Term   2            AB        Living   2  "      SAB        TAB        Ectopic        Molar        Multiple        Live Births   2                 Social History     Socioeconomic History   • Marital status: Single     Spouse name: Not on file   • Number of children: Not on file   • Years of education: Not on file   • Highest education level: Not on file   Tobacco Use   • Smoking status: Current Every Day Smoker     Packs/day: 1.00     Years: 24.00     Pack years: 24.00     Types: Cigarettes   • Smokeless tobacco: Never Used   Substance and Sexual Activity   • Alcohol use: Yes     Comment: social   • Drug use: No   • Sexual activity: Yes     Partners: Male     Birth control/protection: Surgical        Past Surgical History:   Procedure Laterality Date   • ASCENDING ARCH/HEMIARCH REPLACEMENT     • CARDIAC VALVE SURGERY     • HAND SURGERY     • OTHER SURGICAL HISTORY      aortic dissection repair   • TUBAL ABDOMINAL LIGATION          Patient Active Problem List   Diagnosis   • Aortic dissection, thoracic (CMS/HCC)   • H/O aortic arch repair   • History of aortic root repair   • CKD (chronic kidney disease)   • Essential hypertension   • Sjogren's syndrome (CMS/HCC)   • Chronic renal insufficiency, stage III (moderate)   • Uterine leiomyoma   • Dysmenorrhea   • Pelvic pain        Documented Vitals    01/07/21 1156   BP: 142/84   Weight: 123 kg (271 lb)   Height: 182.9 cm (72\")   PainSc: 0-No pain        Body mass index is 36.75 kg/m².    Physical Exam  Vitals signs reviewed.   Constitutional:       General: She is not in acute distress.     Appearance: Normal appearance. She is not ill-appearing, toxic-appearing or diaphoretic.   HENT:      Head: Normocephalic.      Mouth/Throat:      Mouth: Mucous membranes are moist.   Neck:      Musculoskeletal: Normal range of motion.   Musculoskeletal: Normal range of motion.   Skin:     General: Skin is warm and dry.   Neurological:      General: No focal deficit present.      Mental Status: She is alert and oriented to " person, place, and time.   Psychiatric:         Mood and Affect: Mood normal.         Behavior: Behavior normal.         Thought Content: Thought content normal.         Judgment: Judgment normal.         Laboratory Data:   Lab Results - Last 18 Months   Lab Units 01/27/20  0255 12/19/19  0925   GLUCOSE mg/dL 80 70   BUN mg/dL 23* 18   CREATININE mg/dL 1.86* 1.73*   SODIUM mmol/L 138 141   POTASSIUM mmol/L 4.4 4.1   CHLORIDE mmol/L 106 109*   CO2 mmol/L 22.0 20.8*   CALCIUM mg/dL 9.1 8.9   TOTAL PROTEIN g/dL 7.5 7.2   ALBUMIN g/dL 3.90 3.80   ALT (SGPT) U/L 15 18   AST (SGOT) U/L 19 18   ALK PHOS U/L 42 39   BILIRUBIN mg/dL <0.2* <0.2*   GLOBULIN gm/dL 3.6 3.4   A/G RATIO g/dL 1.1 1.1   BUN / CREAT RATIO  12.4 10.4   ANION GAP mmol/L 10.0 11.2     Lab Results - Last 18 Months   Lab Units 06/19/20  1033 01/27/20  0255 12/19/19  0925   WBC 10*3/mm3 4.88 4.55 3.54   RBC 10*6/mm3 4.30 4.19 3.68*   HEMOGLOBIN g/dL 13.4 13.0 11.5*   HEMATOCRIT % 40.7 39.1 35.4   MCV fL 94.7 93.3 96.2   MCH pg 31.2 31.0 31.3   MCHC g/dL 32.9 33.2 32.5   RDW % 13.2 12.6 13.6   RDW-SD fl 45.1 43.0 48.1   MPV fL 11.6 10.7 11.5   PLATELETS 10*3/mm3 193 147 200     No results for input(s): HCGQUAL in the last 83353 hours.    Assessment   Patient with fibroid uterus however is not wanting to undergo surgery at this time.  Would like to wait possibly 6 more months.  We will have patient get repeat ultrasound to evaluate for any significant change in size of fibroids if those are stable we will continue to monitor at this time.     Diagnosis Plan   1. Dysmenorrhea     2. Pelvic pain  US Non-ob Transvaginal   3. Intramural leiomyoma of uterus  US Non-ob Transvaginal             This document has been electronically signed by Bo Mccallum DO on January 7, 2021 13:21 CST

## 2021-01-13 DIAGNOSIS — E55.9 VITAMIN D DEFICIENCY: ICD-10-CM

## 2021-01-14 RX ORDER — ERGOCALCIFEROL 1.25 MG/1
CAPSULE ORAL
Qty: 4 CAPSULE | Refills: 3 | Status: SHIPPED | OUTPATIENT
Start: 2021-01-14 | End: 2021-05-27 | Stop reason: SDUPTHER

## 2021-01-20 ENCOUNTER — OFFICE VISIT (OUTPATIENT)
Dept: OBSTETRICS AND GYNECOLOGY | Facility: CLINIC | Age: 41
End: 2021-01-20

## 2021-01-20 VITALS
WEIGHT: 267 LBS | DIASTOLIC BLOOD PRESSURE: 76 MMHG | BODY MASS INDEX: 36.16 KG/M2 | SYSTOLIC BLOOD PRESSURE: 122 MMHG | HEIGHT: 72 IN

## 2021-01-20 DIAGNOSIS — D25.1 INTRAMURAL LEIOMYOMA OF UTERUS: ICD-10-CM

## 2021-01-20 DIAGNOSIS — N92.0 MENORRHAGIA WITH REGULAR CYCLE: ICD-10-CM

## 2021-01-20 DIAGNOSIS — N93.9 ABNORMAL UTERINE BLEEDING (AUB): ICD-10-CM

## 2021-01-20 DIAGNOSIS — D25.9 UTERINE LEIOMYOMA, UNSPECIFIED LOCATION: ICD-10-CM

## 2021-01-20 DIAGNOSIS — N94.6 DYSMENORRHEA: ICD-10-CM

## 2021-01-20 DIAGNOSIS — R10.2 PELVIC PAIN: Primary | ICD-10-CM

## 2021-01-20 PROCEDURE — 99213 OFFICE O/P EST LOW 20 MIN: CPT | Performed by: OBSTETRICS & GYNECOLOGY

## 2021-01-20 NOTE — PROGRESS NOTES
Marta Chambers is a 40 y.o. y/o female.     Chief Complaint: Follow-up on fibroid uterus    HPI:   40 y.o. .  Patient's last menstrual period was 2021 (exact date)..  Patient presents to follow-up on fibroid uterus.  Patient is here for repeat ultrasound approximately 3 months after last to verify that there is no significant growth in uterus or fibroids.  Reviewed preliminary results from today and uterus size has increased slightly about 1 cm but no other significant changes fibroid is about the same size my concern for uterine cancer is low at this point.  Patient does continue to have heavy bleeding and had a very heavy period last month.  Is getting a new nephrologist and will readdress whether this provider is comfortable with her taking progesterone therapy or not.  Patient is still wanting to have surgery in .  Recommended that she return to see me in April or May to schedule surgery.  I explained to patient that if bleeding gets significantly worse then we may need to do surgery sooner but that will depend on her symptoms.  Patient will wait and see how surgery for her son goes and then make decisions from there.     Review of Systems   Constitutional: Negative for chills, fatigue and fever.   HENT: Negative for sore throat.    Eyes: Negative for visual disturbance.   Respiratory: Negative for cough, shortness of breath and wheezing.    Cardiovascular: Negative for chest pain, palpitations and leg swelling.   Gastrointestinal: Negative for abdominal pain, diarrhea, nausea and vomiting.   Endocrine: Negative for cold intolerance and heat intolerance.   Genitourinary: Positive for menstrual problem, pelvic pain and vaginal bleeding. Negative for dysuria, flank pain, frequency, vaginal discharge and vaginal pain.   Skin: Negative for color change and pallor.   Neurological: Negative for syncope, light-headedness and headaches.   Psychiatric/Behavioral: Negative for dysphoric mood and  suicidal ideas. The patient is not nervous/anxious.         The following portions of the patient's history were reviewed and updated as appropriate: allergies, current medications, past family history, past medical history, past social history, past surgical history and problem list.    No Known Allergies     Prior to Admission medications    Medication Sig Start Date End Date Taking? Authorizing Provider   amLODIPine (NORVASC) 10 MG tablet Take 1 tablet by mouth Daily. 9/18/20  Yes Katerina Solis MD   aspirin 81 MG chewable tablet Chew 81 mg Daily.   Yes Marleen Bravo MD   ferrous sulfate 325 (65 FE) MG tablet TAKE ONE TABLET BY MOUTH TWICE A DAY 8/17/20  Yes Suad Hummel APRN   hydrocortisone (ANUSOL-HC) 25 MG suppository Insert 1 suppository into the rectum 2 (Two) Times a Day As Needed for Hemorrhoids. 12/19/19  Yes Suad Hummel APRN   losartan (COZAAR) 100 MG tablet Take 100 mg by mouth.   Yes Marleen Bravo MD   metoprolol tartrate (LOPRESSOR) 50 MG tablet Take 1 tablet by mouth Every 12 (Twelve) Hours. 11/17/20  Yes Katerina Solis MD   Multiple Vitamins-Minerals (MULTIVITAMIN ADULT PO) Take 1 tablet by mouth Daily.   Yes Marleen Bravo MD   norethindrone (MICRONOR) 0.35 MG tablet Take 1 tablet by mouth Daily. 10/1/20 10/1/21 Yes Bo Mccallum DO   triamcinolone (KENALOG) 0.1 % cream  9/14/20  Yes Marleen Bravo MD   vitamin D (ERGOCALCIFEROL) 1.25 MG (23908 UT) capsule capsule TAKE ONE CAPSULE BY MOUTH ONCE WEEKLY 1/14/21  Yes Suad Hummel APRN        The patient has a family history of   Family History   Problem Relation Age of Onset   • Sudden death Father         cardiac   • Other Father         ruptured aortic aneurysm   • Aortic dissection Father    • Diabetes Other    • Hypertension Other    • Diabetes Mother    • Hypertension Mother    • COPD Mother    • Asthma Brother    • Hypertension Sister         Past Medical  "History:   Diagnosis Date   • Acute renal failure (CMS/HCC)    • Anemia    • Aneurysm, aortic (CMS/HCC)    • Aortic dissection (CMS/HCC)    • Chest pain    • GERD (gastroesophageal reflux disease)    • Hemorrhoids    • Hypertension    • Obesity    • Sjogren's disease (CMS/HCC)    • Uterine leiomyoma 2020        OB History        2    Para   2    Term   2            AB        Living   2       SAB        TAB        Ectopic        Molar        Multiple        Live Births   2                 Social History     Socioeconomic History   • Marital status: Single     Spouse name: Not on file   • Number of children: Not on file   • Years of education: Not on file   • Highest education level: Not on file   Tobacco Use   • Smoking status: Current Every Day Smoker     Packs/day: 1.00     Years: 24.00     Pack years: 24.00     Types: Cigarettes   • Smokeless tobacco: Never Used   Substance and Sexual Activity   • Alcohol use: Yes     Comment: social   • Drug use: No   • Sexual activity: Yes     Partners: Male     Birth control/protection: Surgical        Past Surgical History:   Procedure Laterality Date   • ASCENDING ARCH/HEMIARCH REPLACEMENT     • CARDIAC VALVE SURGERY     • HAND SURGERY     • OTHER SURGICAL HISTORY      aortic dissection repair   • TUBAL ABDOMINAL LIGATION          Patient Active Problem List   Diagnosis   • Aortic dissection, thoracic (CMS/HCC)   • H/O aortic arch repair   • History of aortic root repair   • CKD (chronic kidney disease)   • Essential hypertension   • Sjogren's syndrome (CMS/HCC)   • Chronic renal insufficiency, stage III (moderate)   • Uterine leiomyoma   • Dysmenorrhea   • Pelvic pain        Documented Vitals    21 1047   BP: 122/76   Weight: 121 kg (267 lb)   Height: 182.9 cm (72\")   PainSc: 0-No pain        Body mass index is 36.21 kg/m².    Physical Exam  Vitals signs and nursing note reviewed.   Constitutional:       General: She is not in acute distress.     " Appearance: Normal appearance. She is well-developed. She is not ill-appearing, toxic-appearing or diaphoretic.   HENT:      Head: Normocephalic.      Mouth/Throat:      Mouth: Mucous membranes are moist.   Neck:      Musculoskeletal: Normal range of motion.      Thyroid: No thyromegaly.   Genitourinary:     Vagina: Normal.   Musculoskeletal: Normal range of motion.         General: No swelling, tenderness or deformity.   Skin:     General: Skin is warm and dry.      Findings: No erythema.   Neurological:      General: No focal deficit present.      Mental Status: She is alert and oriented to person, place, and time.   Psychiatric:         Mood and Affect: Mood normal.         Behavior: Behavior normal.         Thought Content: Thought content normal.         Judgment: Judgment normal.         Laboratory Data:   Lab Results - Last 18 Months   Lab Units 01/27/20  0255 12/19/19  0925   GLUCOSE mg/dL 80 70   BUN mg/dL 23* 18   CREATININE mg/dL 1.86* 1.73*   SODIUM mmol/L 138 141   POTASSIUM mmol/L 4.4 4.1   CHLORIDE mmol/L 106 109*   CO2 mmol/L 22.0 20.8*   CALCIUM mg/dL 9.1 8.9   TOTAL PROTEIN g/dL 7.5 7.2   ALBUMIN g/dL 3.90 3.80   ALT (SGPT) U/L 15 18   AST (SGOT) U/L 19 18   ALK PHOS U/L 42 39   BILIRUBIN mg/dL <0.2* <0.2*   GLOBULIN gm/dL 3.6 3.4   A/G RATIO g/dL 1.1 1.1   BUN / CREAT RATIO  12.4 10.4   ANION GAP mmol/L 10.0 11.2     Lab Results - Last 18 Months   Lab Units 06/19/20  1033 01/27/20  0255 12/19/19  0925   WBC 10*3/mm3 4.88 4.55 3.54   RBC 10*6/mm3 4.30 4.19 3.68*   HEMOGLOBIN g/dL 13.4 13.0 11.5*   HEMATOCRIT % 40.7 39.1 35.4   MCV fL 94.7 93.3 96.2   MCH pg 31.2 31.0 31.3   MCHC g/dL 32.9 33.2 32.5   RDW % 13.2 12.6 13.6   RDW-SD fl 45.1 43.0 48.1   MPV fL 11.6 10.7 11.5   PLATELETS 10*3/mm3 193 147 200     No results for input(s): HCGQUAL in the last 27119 hours.      Assessment/Plan   Diagnoses and all orders for this visit:    1. Pelvic pain (Primary)    2. Dysmenorrhea    3. Intramural  leiomyoma of uterus    4. Uterine leiomyoma, unspecified location    5. Abnormal uterine bleeding (AUB)    6. Menorrhagia with regular cycle      Patient with fibroid uterus causing significant abnormal bleeding and pelvic pain.  Will eventually desire definitive management with hysterectomy but timing is not good for her right now.  Patient to return to see me April or May to schedule surgery.  We will need to do endometrial biopsy at that time.  Patient to return sooner if needed.  Will discuss with her nephrologist whether she can start taking the Micronor to see if this will help with her bleeding at all.    I spent 20 minutes caring for Marta on this date of service. This time includes time spent by me in the following activities:preparing for the visit, reviewing tests, counseling and educating the patient/family/caregiver and documenting information in the medical record    This document has been electronically signed by Bo Mccallum DO on January 20, 2021 11:12 CST

## 2021-03-15 DIAGNOSIS — K64.9 HEMORRHOIDS, UNSPECIFIED HEMORRHOID TYPE: ICD-10-CM

## 2021-03-16 RX ORDER — HYDROCORTISONE ACETATE 25 MG/1
25 SUPPOSITORY RECTAL 2 TIMES DAILY PRN
Qty: 24 SUPPOSITORY | Refills: 3 | Status: SHIPPED | OUTPATIENT
Start: 2021-03-16 | End: 2022-03-21 | Stop reason: SDUPTHER

## 2021-03-24 ENCOUNTER — OFFICE VISIT (OUTPATIENT)
Dept: CARDIOLOGY | Facility: CLINIC | Age: 41
End: 2021-03-24

## 2021-03-24 VITALS
HEART RATE: 57 BPM | DIASTOLIC BLOOD PRESSURE: 84 MMHG | WEIGHT: 272 LBS | HEIGHT: 72 IN | BODY MASS INDEX: 36.84 KG/M2 | OXYGEN SATURATION: 98 % | SYSTOLIC BLOOD PRESSURE: 132 MMHG

## 2021-03-24 DIAGNOSIS — I35.9 NONRHEUMATIC AORTIC VALVE DISORDER: ICD-10-CM

## 2021-03-24 DIAGNOSIS — I71.019 AORTIC DISSECTION, THORACIC (HCC): Primary | ICD-10-CM

## 2021-03-24 DIAGNOSIS — Z98.890 HISTORY OF AORTIC ROOT REPAIR: ICD-10-CM

## 2021-03-24 DIAGNOSIS — I10 ESSENTIAL HYPERTENSION: ICD-10-CM

## 2021-03-24 DIAGNOSIS — Z98.890 H/O AORTIC ARCH REPAIR: ICD-10-CM

## 2021-03-24 LAB
QT INTERVAL: 408 MS
QTC INTERVAL: 397 MS

## 2021-03-24 PROCEDURE — 93000 ELECTROCARDIOGRAM COMPLETE: CPT | Performed by: INTERNAL MEDICINE

## 2021-03-24 PROCEDURE — 99214 OFFICE O/P EST MOD 30 MIN: CPT | Performed by: INTERNAL MEDICINE

## 2021-03-24 RX ORDER — FLUTICASONE PROPIONATE 50 MCG
2 SPRAY, SUSPENSION (ML) NASAL DAILY
COMMUNITY
End: 2021-09-21 | Stop reason: SDUPTHER

## 2021-03-24 RX ORDER — ASPIRIN 81 MG/1
81 TABLET ORAL EVERY OTHER DAY
COMMUNITY

## 2021-03-24 NOTE — PROGRESS NOTES
Marta Chambers  40 y.o. female      1. Aortic dissection, thoracic (CMS/HCC)    2. H/O aortic arch repair    3. History of aortic root repair    4. Essential hypertension    5. Nonrheumatic aortic valve disorder        History of Present Illness   Ms. aMrta Chambers is a 40 y.o. female with a history of obesity and hypertension who presented with [place] type A dissection in October 2014 and underwent repair by  in [place], [place].  PROCEDURES PERFORMED:   1. Urgent repair of a type A aortic dissection using a #36 Vascutek  interposition graft to reconstruct the aortic arch as a hemiarch  reconstruction, and a total replacement of the ascending aorta.   2. Resuspension and reconstruction of the aortic root and valve using BioGlue.    Her other medical issues include CKD which is being followed closely by nephrology, obesity and recently she was evaluated by Dr. Mccallum for multiple fibroids in the uterus.    The patient has done well from a cardiac standpoint with no chest pain, shortness of breath or palpitation.  She has been compliant with her medication.  Her blood pressure was 132/84 mmHg.  No signs of congestive heart failure was noted.    Echocardiogram performed in August 2019 showed showed normal LV systolic function with an EF of 65% with moderate LVH.  There was mild to moderate aortic valve insufficiency.  There was mild dilatation of the aortic root at 3.8 cm.    EKG today showed sinus rhythm with heart rate of 57 bpm.  Poor R wave progression V1 and V2.  Unchanged from previous EKG.      No Known Allergies      Past Medical History:   Diagnosis Date   • Acute renal failure (CMS/HCC)    • Anemia    • Aneurysm, aortic (CMS/HCC)    • Aortic dissection (CMS/HCC)    • Chest pain    • GERD (gastroesophageal reflux disease)    • Hemorrhoids    • Hypertension    • Obesity    • Sjogren's disease (CMS/HCC)    • Uterine leiomyoma 08/12/2020         Past Surgical History:   Procedure Laterality Date   •  ASCENDING ARCH/HEMIARCH REPLACEMENT     • CARDIAC VALVE SURGERY     • HAND SURGERY     • OTHER SURGICAL HISTORY      aortic dissection repair   • TUBAL ABDOMINAL LIGATION           Family History   Problem Relation Age of Onset   • Sudden death Father         cardiac   • Other Father         ruptured aortic aneurysm   • Aortic dissection Father    • Diabetes Other    • Hypertension Other    • Diabetes Mother    • Hypertension Mother    • COPD Mother    • Asthma Brother    • Hypertension Sister          Social History     Socioeconomic History   • Marital status: Single     Spouse name: Not on file   • Number of children: Not on file   • Years of education: Not on file   • Highest education level: Not on file   Tobacco Use   • Smoking status: Current Every Day Smoker     Packs/day: 1.00     Years: 24.00     Pack years: 24.00     Types: Cigarettes   • Smokeless tobacco: Never Used   Substance and Sexual Activity   • Alcohol use: Yes     Comment: social   • Drug use: No   • Sexual activity: Yes     Partners: Male     Birth control/protection: Surgical         Current Outpatient Medications   Medication Sig Dispense Refill   • amLODIPine (NORVASC) 10 MG tablet Take 1 tablet by mouth Daily. 90 tablet 3   • aspirin (aspirin) 81 MG EC tablet Aspir-81 mg tablet,delayed release   Take 1 tablet every day by oral route.     • ferrous sulfate 325 (65 FE) MG tablet TAKE ONE TABLET BY MOUTH TWICE A  tablet 2   • fluticasone (Flonase) 50 MCG/ACT nasal spray Flonase Allergy Relief 50 mcg/actuation nasal spray,suspension   instill 2 sprays into each nostril once daily     • hydrocortisone (ANUSOL-HC) 25 MG suppository Insert 1 suppository into the rectum 2 (Two) Times a Day As Needed for Hemorrhoids. 24 suppository 3   • losartan (COZAAR) 100 MG tablet Take 100 mg by mouth.     • metoprolol tartrate (LOPRESSOR) 50 MG tablet Take 1 tablet by mouth Every 12 (Twelve) Hours. 90 tablet 3   • Multiple Vitamins-Minerals  "(MULTIVITAMIN ADULT PO) Take 1 tablet by mouth Daily.     • triamcinolone (KENALOG) 0.1 % cream      • vitamin D (ERGOCALCIFEROL) 1.25 MG (95984 UT) capsule capsule TAKE ONE CAPSULE BY MOUTH ONCE WEEKLY 4 capsule 3   • norethindrone (MICRONOR) 0.35 MG tablet Take 1 tablet by mouth Daily. 28 tablet 12     No current facility-administered medications for this visit.         OBJECTIVE    /84   Pulse 57   Ht 182.9 cm (72\")   Wt 123 kg (272 lb)   SpO2 98%   BMI 36.89 kg/m²         Review of Systems : The following systems were reviewed and no changes were noted    Constitutional:  Denies recent weight loss, weight gain, fever or chills, no change in exercise tolerance     HENT:  Denies any hearing loss, epistaxis, hoarseness, or difficulty speaking.     Eyes: Wears eyeglasses or contact lenses     Respiratory:  Denies dyspnea with exertion,no cough, wheezing, or hemoptysis.     Cardiovascular: Negative for palpations, chest pain    Gastrointestinal:  Denies change in bowel habits, dyspepsia, ulcer disease, hematochezia, or melena.     Endocrine: Negative for cold intolerance, heat intolerance, polydipsia, polyphagia and polyuria.     Genitourinary: CKD, fibroid uterus      Musculoskeletal: Denies any history of arthritic symptoms or back problems     Neurological:  Denies any history of recurrent headaches, strokes, TIA, or seizure disorder.     Hematological: Denies any food allergies, seasonal allergies, bleeding disorders, or lymphadenopathy.     Psychiatric/Behavioral: Denies any history of depression, substance abuse, or change in cognitive function.         Physical Exam : The following systems were reassessed and no changes noted    Constitutional: Cooperative, alert and oriented,  in no acute distress.     HENT:   Head: Normocephalic, normal hair patterns, no masses or tenderness.  Ears, Nose, and Throat: No gross abnormalities. No pallor or cyanosis. Dentition good.   Eyes: EOMS intact, PERRL, " conjunctivae and lids unremarkable. Fundoscopic exam and visual fields not performed.   Neck: No palpable masses or adenopathy, no thyromegaly, no JVD, carotid pulses are full and equal bilaterally and without  Bruits.     Cardiovascular: Regular rhythm, S1 and S2 normal, no S3 or S4. 2/6 systolic murmur, no gallops, or rubs detected.     Pulmonary/Chest: Chest: normal symmetry,  normal respiratory excursion, no intercostal retraction, no use of accessory muscles.            Pulmonary: Normal breath sounds. No rales or ronchi.    Abdominal: Abdomen soft, bowel sounds normoactive, no masses, no hepatosplenomegaly, non-tender, no bruits.     Musculoskeletal: No deformities, clubbing, cyanosis, erythema, or edema observed.     Neurological: No gross motor or sensory deficits noted, affect appropriate, oriented to time, person, place.     Skin: Warm and dry to the touch, no apparent skin lesions or masses noted.     Psychiatric: She has a normal mood and affect. Her behavior is normal. Judgment and thought content normal.         Procedures      Lab Results   Component Value Date    WBC 4.88 06/19/2020    HGB 13.4 06/19/2020    HCT 40.7 06/19/2020    MCV 94.7 06/19/2020     06/19/2020     Lab Results   Component Value Date    GLUCOSE 80 01/27/2020    BUN 23 (H) 01/27/2020    CREATININE 1.86 (H) 01/27/2020    EGFRIFAFRI 36 (L) 01/27/2020    BCR 12.4 01/27/2020    CO2 22.0 01/27/2020    CALCIUM 9.1 01/27/2020    ALBUMIN 3.90 01/27/2020    AST 19 01/27/2020    ALT 15 01/27/2020     Lab Results   Component Value Date    CHOL 157 06/29/2018    CHOL 159 07/06/2017     Lab Results   Component Value Date    TRIG 84 06/29/2018    TRIG 163 07/06/2017    TRIG 77 10/22/2014     Lab Results   Component Value Date    HDL 48 (L) 06/29/2018    HDL 43 (L) 07/06/2017    HDL 44 10/22/2014     No components found for: LDLCALC  Lab Results   Component Value Date    LDL 80 06/29/2018    LDL 83 07/06/2017    LDL 84 10/22/2014     No  results found for: HDLLDLRATIO  No components found for: CHOLHDL  Lab Results   Component Value Date    HGBA1C 5.3 10/23/2014     Lab Results   Component Value Date    TSH 1.450 06/18/2019           ASSESSMENT AND PLAN  Ms. Chambers is stable with regards to her heart with no evidence of cardiac symptoms at the present time.  No signs of angina, arrhythmia or congestive heart failure noted.  Her aortic valve insufficiency appears to be hemodynamically stable.     I have continued antiplatelet therapy with aspirin, antihypertensive therapy with amlodipine, losartan and metoprolol tartrate.   An echocardiogram to reassess left ventricular and valvular function is being arranged.  The patient may be considered for hysterectomy in June this year.  Based on the information I have she will be low to moderate risk for perioperative cardiac events.    Diagnoses and all orders for this visit:    1. Aortic dissection, thoracic (CMS/HCC) (Primary)  -     Adult Transthoracic Echo Complete w/ Color, Spectral and Contrast if Necessary Per Protocol; Future    2. H/O aortic arch repair  -     Adult Transthoracic Echo Complete w/ Color, Spectral and Contrast if Necessary Per Protocol; Future    3. History of aortic root repair  -     Adult Transthoracic Echo Complete w/ Color, Spectral and Contrast if Necessary Per Protocol; Future    4. Essential hypertension  -     ECG 12 Lead  -     Adult Transthoracic Echo Complete w/ Color, Spectral and Contrast if Necessary Per Protocol; Future    5. Nonrheumatic aortic valve disorder  -     Adult Transthoracic Echo Complete w/ Color, Spectral and Contrast if Necessary Per Protocol; Future        Patient's Body mass index is 36.89 kg/m². BMI is above normal parameters. Recommendations include: exercise counseling and nutrition counseling.  Patient is a non-smoker.    Katerina Solis MD  3/24/2021  17:53 CDT

## 2021-04-20 ENCOUNTER — DOCUMENTATION (OUTPATIENT)
Dept: CARDIOLOGY | Facility: CLINIC | Age: 41
End: 2021-04-20

## 2021-04-20 NOTE — PROGRESS NOTES
Echo results and recommendations per Dr Winters    EF normal  Aortic valve leak mild-moderate (no change from previous)  Mild dilation of aorta   Continue to monitor  Follow up as scheduled  Optimal Blood Pressure monitoring and control.    Patient verbalized understanding and she had no questions or concerns at this time

## 2021-05-06 ENCOUNTER — LAB (OUTPATIENT)
Dept: LAB | Facility: HOSPITAL | Age: 41
End: 2021-05-06

## 2021-05-06 ENCOUNTER — PROCEDURE VISIT (OUTPATIENT)
Dept: OBSTETRICS AND GYNECOLOGY | Facility: CLINIC | Age: 41
End: 2021-05-06

## 2021-05-06 VITALS
WEIGHT: 275 LBS | DIASTOLIC BLOOD PRESSURE: 80 MMHG | HEIGHT: 72 IN | SYSTOLIC BLOOD PRESSURE: 136 MMHG | BODY MASS INDEX: 37.25 KG/M2

## 2021-05-06 DIAGNOSIS — N92.0 MENORRHAGIA WITH REGULAR CYCLE: ICD-10-CM

## 2021-05-06 DIAGNOSIS — N94.6 DYSMENORRHEA: ICD-10-CM

## 2021-05-06 DIAGNOSIS — D25.1 INTRAMURAL LEIOMYOMA OF UTERUS: ICD-10-CM

## 2021-05-06 DIAGNOSIS — R10.2 PELVIC PAIN: Primary | ICD-10-CM

## 2021-05-06 DIAGNOSIS — N93.9 ABNORMAL UTERINE BLEEDING (AUB): ICD-10-CM

## 2021-05-06 PROCEDURE — 58100 BIOPSY OF UTERUS LINING: CPT | Performed by: OBSTETRICS & GYNECOLOGY

## 2021-05-06 PROCEDURE — 99214 OFFICE O/P EST MOD 30 MIN: CPT | Performed by: OBSTETRICS & GYNECOLOGY

## 2021-05-06 RX ORDER — SODIUM CHLORIDE 0.9 % (FLUSH) 0.9 %
3 SYRINGE (ML) INJECTION EVERY 12 HOURS SCHEDULED
Status: CANCELLED | OUTPATIENT
Start: 2021-06-07

## 2021-05-06 RX ORDER — CEFAZOLIN SODIUM IN 0.9 % NACL 3 G/100 ML
3 INTRAVENOUS SOLUTION, PIGGYBACK (ML) INTRAVENOUS ONCE
Status: CANCELLED | OUTPATIENT
Start: 2021-06-07 | End: 2021-05-06

## 2021-05-06 RX ORDER — SODIUM CHLORIDE 0.9 % (FLUSH) 0.9 %
10 SYRINGE (ML) INJECTION AS NEEDED
Status: CANCELLED | OUTPATIENT
Start: 2021-06-07

## 2021-05-06 RX ORDER — SODIUM CHLORIDE, SODIUM LACTATE, POTASSIUM CHLORIDE, CALCIUM CHLORIDE 600; 310; 30; 20 MG/100ML; MG/100ML; MG/100ML; MG/100ML
125 INJECTION, SOLUTION INTRAVENOUS CONTINUOUS
Status: CANCELLED | OUTPATIENT
Start: 2021-06-07

## 2021-05-06 NOTE — PROGRESS NOTES
Marta Chambers is a 40 y.o. y/o female.     Chief Complaint: Fibroid uterus abnormal bleeding and pelvic pain    HPI:   40 y.o. .  Patient's last menstrual period was 2021..  Patient presents for follow-up on heavy vaginal bleeding pelvic pain and fibroids.  Patient is now desiring definitive management as we have previously discussed the symptoms.  We will plan for total abdominal hysterectomy with bilateral salpingectomy and cystoscopy on .  Patient continues to have the same symptoms that she has been having.  Patient also here for endometrial biopsy today this procedure was explained in detail.  Patient comfortable with proceeding.    I reviewed the risks, benefits, and alternatives of total abdominal hysterectomy with bilateral salpingectomy and cystoscopy today including the small but real risk of mortality. I reviewed the risk of vesicovaginal fistula with incontinence until it can be repaired. I reviewed the risk of damage to ureters with need for additional surgery and possibly nephrectomy. I reviewed the risk of damage to the bowel with possible colostomy and/or sepsis and/or late return to OR.  I discussed the risk of bleeding with the patient and possible risk of blood transfusion and/or late return to OR .  Blood products carry risk of HIV, infection, hepatitis, and transfusion reaction. Patient is willing to accept blood products. She understands the risk of infection in the abdominal wall, pelvis, abdomen and other parts of the body. I reviewed the risk of blood clots in the leg with possible emboli to the lungs. I reviewed how her medical and surgical history affect her risk. I reviewed alternate methods of hysterectomy and why I am recommending abdominal approach and offered referral if desired. I reviewed alternate methods of treatment for her condition. Questions answered at length. Patient expressed understanding and desires to proceed with surgery.     Note from last  visit:Patient presents to follow-up on fibroid uterus.  Patient is here for repeat ultrasound approximately 3 months after last to verify that there is no significant growth in uterus or fibroids.  Reviewed preliminary results from today and uterus size has increased slightly about 1 cm but no other significant changes fibroid is about the same size my concern for uterine cancer is low at this point.  Patient does continue to have heavy bleeding and had a very heavy period last month.  Is getting a new nephrologist and will readdress whether this provider is comfortable with her taking progesterone therapy or not.  Patient is still wanting to have surgery in June.  Recommended that she return to see me in April or May to schedule surgery.  I explained to patient that if bleeding gets significantly worse then we may need to do surgery sooner but that will depend on her symptoms.  Patient will wait and see how surgery for her son goes and then make decisions from there.     Review of Systems   Constitutional: Negative for chills, fatigue and fever.   HENT: Negative for sore throat.    Eyes: Negative for visual disturbance.   Respiratory: Negative for cough, shortness of breath and wheezing.    Cardiovascular: Negative for chest pain, palpitations and leg swelling.   Gastrointestinal: Negative for abdominal pain, diarrhea, nausea and vomiting.   Endocrine: Negative for cold intolerance and heat intolerance.   Genitourinary: Positive for menstrual problem, pelvic pain and vaginal bleeding. Negative for dysuria, flank pain, frequency, vaginal discharge and vaginal pain.   Skin: Negative for color change and pallor.   Neurological: Negative for syncope, light-headedness and headaches.   Psychiatric/Behavioral: Negative for dysphoric mood and suicidal ideas. The patient is not nervous/anxious.         The following portions of the patient's history were reviewed and updated as appropriate: allergies, current medications,  past family history, past medical history, past social history, past surgical history and problem list.    No Known Allergies     Prior to Admission medications    Medication Sig Start Date End Date Taking? Authorizing Provider   amLODIPine (NORVASC) 10 MG tablet Take 1 tablet by mouth Daily. 9/18/20  Yes Katerina Solis MD   aspirin (aspirin) 81 MG EC tablet Aspir-81 mg tablet,delayed release   Take 1 tablet every day by oral route.   Yes Marleen Bravo MD   ferrous sulfate 325 (65 FE) MG tablet TAKE ONE TABLET BY MOUTH TWICE A DAY 8/17/20  Yes Suad Hummel APRN   fluticasone (Flonase) 50 MCG/ACT nasal spray Flonase Allergy Relief 50 mcg/actuation nasal spray,suspension   instill 2 sprays into each nostril once daily   Yes Marleen Bravo MD   hydrocortisone (ANUSOL-HC) 25 MG suppository Insert 1 suppository into the rectum 2 (Two) Times a Day As Needed for Hemorrhoids. 3/16/21  Yes Suad Hummel APRN   losartan (COZAAR) 100 MG tablet Take 100 mg by mouth.   Yes Marleen Bravo MD   metoprolol tartrate (LOPRESSOR) 50 MG tablet Take 1 tablet by mouth Every 12 (Twelve) Hours. 11/17/20  Yes Katerina Solis MD   Multiple Vitamins-Minerals (MULTIVITAMIN ADULT PO) Take 1 tablet by mouth Daily.   Yes Marleen Bravo MD   triamcinolone (KENALOG) 0.1 % cream  9/14/20  Yes Marleen Bravo MD   vitamin D (ERGOCALCIFEROL) 1.25 MG (40081 UT) capsule capsule TAKE ONE CAPSULE BY MOUTH ONCE WEEKLY 1/14/21  Yes Suad Hummel APRN   norethindrone (MICRONOR) 0.35 MG tablet Take 1 tablet by mouth Daily. 10/1/20 10/1/21  Bo Mccallum, DO        The patient has a family history of   Family History   Problem Relation Age of Onset   • Sudden death Father         cardiac   • Other Father         ruptured aortic aneurysm   • Aortic dissection Father    • Diabetes Other    • Hypertension Other    • Diabetes Mother    • Hypertension Mother    • COPD Mother    •  "Asthma Brother    • Hypertension Sister         Past Medical History:   Diagnosis Date   • Acute renal failure (CMS/HCC)    • Anemia    • Aneurysm, aortic (CMS/HCC)    • Aortic dissection (CMS/HCC)    • Chest pain    • GERD (gastroesophageal reflux disease)    • Hemorrhoids    • Hypertension    • Obesity    • Sjogren's disease (CMS/HCC)    • Uterine leiomyoma 2020        OB History        2    Para   2    Term   2            AB        Living   2       SAB        TAB        Ectopic        Molar        Multiple        Live Births   2                 Social History     Socioeconomic History   • Marital status: Single     Spouse name: Not on file   • Number of children: Not on file   • Years of education: Not on file   • Highest education level: Not on file   Tobacco Use   • Smoking status: Current Every Day Smoker     Packs/day: 1.00     Years: 24.00     Pack years: 24.00     Types: Cigarettes   • Smokeless tobacco: Never Used   Substance and Sexual Activity   • Alcohol use: Yes     Comment: social   • Drug use: No   • Sexual activity: Yes     Partners: Male     Birth control/protection: Surgical        Past Surgical History:   Procedure Laterality Date   • ASCENDING ARCH/HEMIARCH REPLACEMENT     • CARDIAC VALVE SURGERY     • HAND SURGERY     • OTHER SURGICAL HISTORY      aortic dissection repair   • TUBAL ABDOMINAL LIGATION          Patient Active Problem List   Diagnosis   • Aortic dissection, thoracic (CMS/HCC)   • H/O aortic arch repair   • History of aortic root repair   • CKD (chronic kidney disease)   • Essential hypertension   • Sjogren's syndrome (CMS/HCC)   • Chronic renal insufficiency, stage III (moderate) (CMS/HCC)   • Uterine leiomyoma   • Dysmenorrhea   • Pelvic pain   • Nonrheumatic aortic valve disorder        Documented Vitals    21 0952   BP: 136/80   Weight: 125 kg (275 lb)   Height: 182.9 cm (72\")        Body mass index is 37.3 kg/m².    Physical Exam  Vitals and nursing " note reviewed.   Constitutional:       General: She is not in acute distress.     Appearance: Normal appearance. She is well-developed. She is not ill-appearing, toxic-appearing or diaphoretic.   HENT:      Head: Normocephalic.      Mouth/Throat:      Mouth: Mucous membranes are moist.   Neck:      Thyroid: No thyromegaly.   Cardiovascular:      Rate and Rhythm: Normal rate and regular rhythm.      Heart sounds: Normal heart sounds. No murmur heard.     Pulmonary:      Effort: Pulmonary effort is normal. No respiratory distress.      Breath sounds: Normal breath sounds. No wheezing.   Abdominal:      General: Bowel sounds are normal. There is no distension.      Palpations: Abdomen is soft. There is no mass.      Tenderness: There is no abdominal tenderness. There is no guarding or rebound.   Genitourinary:     General: Normal vulva.      Vagina: Normal. No vaginal discharge.      Comments: Endometrial biopsy done without difficulty.  Anterior lip of the cervix grasped with Allis clamp.  Uterus sounded to approximately 13 or 14 cm.  Moderate amount of tissue obtained.  Patient tolerated procedure well.  Bimanual exam with enlarged uterus approximately 16 to 18 weeks size.  No adnexal masses palpated.  Musculoskeletal:         General: No swelling, tenderness or deformity. Normal range of motion.      Cervical back: Normal range of motion.   Skin:     General: Skin is warm and dry.      Findings: No erythema.   Neurological:      General: No focal deficit present.      Mental Status: She is alert and oriented to person, place, and time.   Psychiatric:         Mood and Affect: Mood normal.         Behavior: Behavior normal.         Thought Content: Thought content normal.         Judgment: Judgment normal.         Laboratory Data:   Lab Results - Last 18 Months   Lab Units 01/27/20  0255 12/19/19  0925   GLUCOSE mg/dL 80 70   BUN mg/dL 23* 18   CREATININE mg/dL 1.86* 1.73*   SODIUM mmol/L 138 141   POTASSIUM mmol/L  4.4 4.1   CHLORIDE mmol/L 106 109*   CO2 mmol/L 22.0 20.8*   CALCIUM mg/dL 9.1 8.9   TOTAL PROTEIN g/dL 7.5 7.2   ALBUMIN g/dL 3.90 3.80   ALT (SGPT) U/L 15 18   AST (SGOT) U/L 19 18   ALK PHOS U/L 42 39   BILIRUBIN mg/dL <0.2* <0.2*   GLOBULIN gm/dL 3.6 3.4   A/G RATIO g/dL 1.1 1.1   BUN / CREAT RATIO  12.4 10.4   ANION GAP mmol/L 10.0 11.2     Lab Results - Last 18 Months   Lab Units 06/19/20  1033 01/27/20  0255 12/19/19  0925   WBC 10*3/mm3 4.88 4.55 3.54   RBC 10*6/mm3 4.30 4.19 3.68*   HEMOGLOBIN g/dL 13.4 13.0 11.5*   HEMATOCRIT % 40.7 39.1 35.4   MCV fL 94.7 93.3 96.2   MCH pg 31.2 31.0 31.3   MCHC g/dL 32.9 33.2 32.5   RDW % 13.2 12.6 13.6   RDW-SD fl 45.1 43.0 48.1   MPV fL 11.6 10.7 11.5   PLATELETS 10*3/mm3 193 147 200     No results for input(s): HCGQUAL in the last 19413 hours.    Last Pap smear:   Last Completed Pap Smear          PAP SMEAR (Every 3 Years) Next due on 9/3/2023    09/03/2020  Liquid-based Pap Smear, Screening    06/04/2018  Patient-Reported (Performed Externally) - 3 yrs ago and will schecule.    11/02/2007  Converted (Historical) Gyn Cytology            Most recent normal  Last mammography:   Last Completed Mammogram     This patient has no relevant Health Maintenance data.      Will order after surgery    Assessment/Plan   Diagnoses and all orders for this visit:    1. Pelvic pain (Primary)  -     Case Request; Standing  -     sodium chloride 0.9 % flush 3 mL  -     sodium chloride 0.9 % flush 10 mL  -     lactated ringers infusion  -     CBC and Differential; Future  -     Basic Metabolic Panel; Future  -     hCG, Serum, QUALITATIVE; Future  -     ceFAZolin (ANCEF) 3 g in sodium chloride 0.9 % 100 mL IVPB  -     Case Request    2. Dysmenorrhea  -     Case Request; Standing  -     sodium chloride 0.9 % flush 3 mL  -     sodium chloride 0.9 % flush 10 mL  -     lactated ringers infusion  -     CBC and Differential; Future  -     Basic Metabolic Panel; Future  -     hCG, Serum,  QUALITATIVE; Future  -     ceFAZolin (ANCEF) 3 g in sodium chloride 0.9 % 100 mL IVPB  -     Case Request    3. Intramural leiomyoma of uterus  -     Case Request; Standing  -     sodium chloride 0.9 % flush 3 mL  -     sodium chloride 0.9 % flush 10 mL  -     lactated ringers infusion  -     CBC and Differential; Future  -     Basic Metabolic Panel; Future  -     hCG, Serum, QUALITATIVE; Future  -     ceFAZolin (ANCEF) 3 g in sodium chloride 0.9 % 100 mL IVPB  -     Case Request    4. Abnormal uterine bleeding (AUB)  -     Case Request; Standing  -     sodium chloride 0.9 % flush 3 mL  -     sodium chloride 0.9 % flush 10 mL  -     lactated ringers infusion  -     CBC and Differential; Future  -     Basic Metabolic Panel; Future  -     hCG, Serum, QUALITATIVE; Future  -     ceFAZolin (ANCEF) 3 g in sodium chloride 0.9 % 100 mL IVPB  -     Case Request    5. Menorrhagia with regular cycle  -     Case Request; Standing  -     sodium chloride 0.9 % flush 3 mL  -     sodium chloride 0.9 % flush 10 mL  -     lactated ringers infusion  -     CBC and Differential; Future  -     Basic Metabolic Panel; Future  -     hCG, Serum, QUALITATIVE; Future  -     ceFAZolin (ANCEF) 3 g in sodium chloride 0.9 % 100 mL IVPB  -     Case Request    Other orders  -     Follow Anesthesia Guidelines / Standing Orders; Future  -     Chlorhexidine Skin Prep; Future  -     Follow Anesthesia Guidelines / Standing Orders; Standing  -     Obtain Informed Consent; Standing  -     Place Sequential Compression Device; Standing  -     Verify / Perform Chlorhexidine Skin Prep; Standing  -     Pregnancy, Urine -; Standing  -     Type & Screen; Standing  -     Insert Peripheral IV; Standing  -     Saline Lock & Maintain IV Access; Standing      Patient with fibroid uterus with heavy bleeding and pain now desiring definitive management.  We will plan for total abdominal hysterectomy bilateral salpingectomy and cystoscopy on 7 June, patient return to see  me approximately 1 week after surgery, sooner as needed.  Uncomplicated endometrial biopsy today.    This document has been electronically signed by Bo Mccallum DO on May 6, 2021 10:17 CDT

## 2021-05-10 LAB
LAB AP CASE REPORT: NORMAL
LAB AP CLINICAL INFORMATION: NORMAL
PATH REPORT.FINAL DX SPEC: NORMAL

## 2021-05-14 DIAGNOSIS — I10 ESSENTIAL HYPERTENSION: ICD-10-CM

## 2021-05-14 RX ORDER — METOPROLOL TARTRATE 50 MG/1
TABLET, FILM COATED ORAL
Qty: 180 TABLET | Refills: 2 | Status: SHIPPED | OUTPATIENT
Start: 2021-05-14 | End: 2021-06-04

## 2021-05-25 ENCOUNTER — DOCUMENTATION (OUTPATIENT)
Dept: OBSTETRICS AND GYNECOLOGY | Facility: CLINIC | Age: 41
End: 2021-05-25

## 2021-05-25 NOTE — PROGRESS NOTES
Patient is a 40-year-old female with large fibroid uterus with significant abnormal uterine bleeding.  Patient is scheduled for total abdominal hysterectomy with bilateral salpingo-oophorectomy to be done on 7 June.  Patient has multiple chronic heart problems to include history of aortic dissection with repair, also a history of aortic root repair.  Patient has essential hypertension and nonrheumatic aortic valve disease.  Patient also with chronic kidney disease and Sjogren's syndrome.  Patient is on multiple blood pressure medications.  Along with baby aspirin.  Please see and evaluate patient for surgical clearance prior to her scheduled surgery.  Please make recommendation as far as any changes in blood pressure medication that may be needed this should not affect surgery and I do not believe they need to be stopped.  Preferably patient would stop baby aspirin prior to surgery and could be restarted within 1 week of surgery.  Please make recommendations for surgery.  Any other recommendations that may be helpful would also be appreciated.  Appreciate cardiology and nephrology's help in managing this patient and her preoperative and postoperative course.

## 2021-05-26 ENCOUNTER — TELEPHONE (OUTPATIENT)
Dept: FAMILY MEDICINE CLINIC | Facility: CLINIC | Age: 41
End: 2021-05-26

## 2021-05-26 NOTE — TELEPHONE ENCOUNTER
Called to get patient appointment with same day to get refills. Shes going to call back because shes working from home

## 2021-05-27 DIAGNOSIS — E55.9 VITAMIN D DEFICIENCY: ICD-10-CM

## 2021-05-28 RX ORDER — ERGOCALCIFEROL 1.25 MG/1
50000 CAPSULE ORAL WEEKLY
Qty: 4 CAPSULE | Refills: 3 | Status: SHIPPED | OUTPATIENT
Start: 2021-05-28 | End: 2021-09-29

## 2021-06-04 ENCOUNTER — LAB (OUTPATIENT)
Dept: LAB | Facility: HOSPITAL | Age: 41
End: 2021-06-04

## 2021-06-04 ENCOUNTER — PRE-ADMISSION TESTING (OUTPATIENT)
Dept: PREADMISSION TESTING | Facility: HOSPITAL | Age: 41
End: 2021-06-04

## 2021-06-04 VITALS
OXYGEN SATURATION: 95 % | WEIGHT: 275 LBS | BODY MASS INDEX: 37.25 KG/M2 | SYSTOLIC BLOOD PRESSURE: 160 MMHG | HEART RATE: 76 BPM | HEIGHT: 72 IN | RESPIRATION RATE: 18 BRPM | DIASTOLIC BLOOD PRESSURE: 90 MMHG

## 2021-06-04 DIAGNOSIS — R10.2 PELVIC PAIN: ICD-10-CM

## 2021-06-04 DIAGNOSIS — N92.0 MENORRHAGIA WITH REGULAR CYCLE: ICD-10-CM

## 2021-06-04 DIAGNOSIS — N94.6 DYSMENORRHEA: ICD-10-CM

## 2021-06-04 DIAGNOSIS — D25.1 INTRAMURAL LEIOMYOMA OF UTERUS: ICD-10-CM

## 2021-06-04 DIAGNOSIS — Z01.818 PREOP TESTING: Primary | ICD-10-CM

## 2021-06-04 DIAGNOSIS — N93.9 ABNORMAL UTERINE BLEEDING (AUB): ICD-10-CM

## 2021-06-04 LAB
ANION GAP SERPL CALCULATED.3IONS-SCNC: 5 MMOL/L (ref 5–15)
BASOPHILS # BLD AUTO: 0.02 10*3/MM3 (ref 0–0.2)
BASOPHILS NFR BLD AUTO: 0.4 % (ref 0–1.5)
BUN SERPL-MCNC: 25 MG/DL (ref 6–20)
BUN/CREAT SERPL: 12 (ref 7–25)
CALCIUM SPEC-SCNC: 9.4 MG/DL (ref 8.6–10.5)
CHLORIDE SERPL-SCNC: 111 MMOL/L (ref 98–107)
CO2 SERPL-SCNC: 24 MMOL/L (ref 22–29)
CREAT SERPL-MCNC: 2.08 MG/DL (ref 0.57–1)
DEPRECATED RDW RBC AUTO: 49.7 FL (ref 37–54)
EOSINOPHIL # BLD AUTO: 0.12 10*3/MM3 (ref 0–0.4)
EOSINOPHIL NFR BLD AUTO: 2.1 % (ref 0.3–6.2)
ERYTHROCYTE [DISTWIDTH] IN BLOOD BY AUTOMATED COUNT: 14.6 % (ref 12.3–15.4)
GFR SERPL CREATININE-BSD FRML MDRD: 32 ML/MIN/1.73
GLUCOSE SERPL-MCNC: 69 MG/DL (ref 65–99)
HCG SERPL QL: NEGATIVE
HCT VFR BLD AUTO: 39.2 % (ref 34–46.6)
HGB BLD-MCNC: 12.6 G/DL (ref 12–15.9)
IMM GRANULOCYTES # BLD AUTO: 0.02 10*3/MM3 (ref 0–0.05)
IMM GRANULOCYTES NFR BLD AUTO: 0.4 % (ref 0–0.5)
LYMPHOCYTES # BLD AUTO: 1.23 10*3/MM3 (ref 0.7–3.1)
LYMPHOCYTES NFR BLD AUTO: 21.8 % (ref 19.6–45.3)
MCH RBC QN AUTO: 29.8 PG (ref 26.6–33)
MCHC RBC AUTO-ENTMCNC: 32.1 G/DL (ref 31.5–35.7)
MCV RBC AUTO: 92.7 FL (ref 79–97)
MONOCYTES # BLD AUTO: 0.56 10*3/MM3 (ref 0.1–0.9)
MONOCYTES NFR BLD AUTO: 9.9 % (ref 5–12)
NEUTROPHILS NFR BLD AUTO: 3.69 10*3/MM3 (ref 1.7–7)
NEUTROPHILS NFR BLD AUTO: 65.4 % (ref 42.7–76)
NRBC BLD AUTO-RTO: 0 /100 WBC (ref 0–0.2)
PLATELET # BLD AUTO: 201 10*3/MM3 (ref 140–450)
PMV BLD AUTO: 11.4 FL (ref 6–12)
POTASSIUM SERPL-SCNC: 4.8 MMOL/L (ref 3.5–5.2)
RBC # BLD AUTO: 4.23 10*6/MM3 (ref 3.77–5.28)
SARS-COV-2 N GENE RESP QL NAA+PROBE: NOT DETECTED
SODIUM SERPL-SCNC: 140 MMOL/L (ref 136–145)
WBC # BLD AUTO: 5.64 10*3/MM3 (ref 3.4–10.8)

## 2021-06-04 PROCEDURE — 87635 SARS-COV-2 COVID-19 AMP PRB: CPT

## 2021-06-04 PROCEDURE — 85025 COMPLETE CBC W/AUTO DIFF WBC: CPT

## 2021-06-04 PROCEDURE — 86900 BLOOD TYPING SEROLOGIC ABO: CPT

## 2021-06-04 PROCEDURE — 80048 BASIC METABOLIC PNL TOTAL CA: CPT

## 2021-06-04 PROCEDURE — C9803 HOPD COVID-19 SPEC COLLECT: HCPCS

## 2021-06-04 PROCEDURE — 36415 COLL VENOUS BLD VENIPUNCTURE: CPT

## 2021-06-04 PROCEDURE — 86901 BLOOD TYPING SEROLOGIC RH(D): CPT

## 2021-06-04 PROCEDURE — 84703 CHORIONIC GONADOTROPIN ASSAY: CPT

## 2021-06-04 RX ORDER — METOPROLOL TARTRATE 50 MG/1
50 TABLET, FILM COATED ORAL 2 TIMES DAILY
COMMUNITY
End: 2021-10-06

## 2021-06-04 RX ORDER — FERROUS SULFATE 325(65) MG
325 TABLET ORAL EVERY OTHER DAY
COMMUNITY
End: 2022-05-03 | Stop reason: SDUPTHER

## 2021-06-04 NOTE — PAT
Chlorhexidine scrub given with instruction sheet.  Instruction reviewed in PAT, understanding verbalized.    Dr Mccallum had noted that he wanted cardiac clearance as well as clearance from kidney doctor.  I spoke with him via phone to make him aware that I did not see any documentation from kidney doctor.  He stated he was fine with proceeding with surgery with just the cardiac clearance.

## 2021-06-04 NOTE — DISCHARGE INSTRUCTIONS
[facility]  Pre-op Information and Guidelines    You will be called after 2 p.m. the day before your surgery (Friday for Monday surgery) and notified of your time for arrival and approximate surgery time.  If you have not received a call by 4P.M., please contact Same Day Surgery at [phone] of if outside [facility] call [phone].    Please Follow these Important Safety Guidelines:    • The morning of your procedure, take only the medications listed below with   A sip of water:_____________________________________________       ______________________________________________    • DO NOT eat or drink anything after 12:00 midnight the night before surgery  Specific instructions concerning drinking clear liquids will be discussed during  the pre-surgery instruction call the day before your surgery.    • If you take a blood thinner (ex. Plavix, Coumadin, aspirin), ask your doctor when to stop it before surgery  STOP DATE: _________________    • Only 2 visitors are allowed in patient rooms at a time  Your visitors will be asked to wait in the lobby until the admission process is complete with the exception of a parent with a child and patients in need of special assistance.    • YOU CANNOT DRIVE YOURSELF HOME  You must be accompanied by someone who will be responsible for driving you home after surgery and for your care at home.    • DO NOT chew gum, use breath mints, hard candy, or smoke the day of surgery  • DO NOT drink alcohol for at least 24 hours before your surgery  • DO NOT wear any jewelry and remove all body piercing before coming to the hospital  • DO NOT wear make-up to the hospital  • If you are having surgery on an extremity (arm/leg/foot) remove nail polish/artificial nails on the surgical side  • Clothing, glasses, contacts, dentures, and hairpieces must be removed before surgery  • Bathe the night before or the morning of your surgery and do not use powders/lotions on  skin.

## 2021-06-05 ENCOUNTER — ANESTHESIA EVENT (OUTPATIENT)
Dept: PERIOP | Facility: HOSPITAL | Age: 41
End: 2021-06-05

## 2021-06-07 ENCOUNTER — HOSPITAL ENCOUNTER (INPATIENT)
Facility: HOSPITAL | Age: 41
LOS: 2 days | Discharge: HOME | End: 2021-06-09
Attending: OBSTETRICS & GYNECOLOGY | Admitting: OBSTETRICS & GYNECOLOGY

## 2021-06-07 ENCOUNTER — ANESTHESIA (OUTPATIENT)
Dept: PERIOP | Facility: HOSPITAL | Age: 41
End: 2021-06-07

## 2021-06-07 DIAGNOSIS — N94.6 DYSMENORRHEA: ICD-10-CM

## 2021-06-07 DIAGNOSIS — Z90.710 STATUS POST ABDOMINAL HYSTERECTOMY: Primary | ICD-10-CM

## 2021-06-07 DIAGNOSIS — N92.0 MENORRHAGIA WITH REGULAR CYCLE: ICD-10-CM

## 2021-06-07 DIAGNOSIS — N93.9 ABNORMAL UTERINE BLEEDING (AUB): ICD-10-CM

## 2021-06-07 DIAGNOSIS — D25.1 INTRAMURAL LEIOMYOMA OF UTERUS: ICD-10-CM

## 2021-06-07 DIAGNOSIS — R10.2 PELVIC PAIN: ICD-10-CM

## 2021-06-07 LAB
ABO GROUP BLD: NORMAL
B-HCG UR QL: NEGATIVE
BLD GP AB SCN SERPL QL: NEGATIVE
Lab: NORMAL
RH BLD: POSITIVE
T&S EXPIRATION DATE: NORMAL

## 2021-06-07 PROCEDURE — 81025 URINE PREGNANCY TEST: CPT | Performed by: OBSTETRICS & GYNECOLOGY

## 2021-06-07 PROCEDURE — 86850 RBC ANTIBODY SCREEN: CPT | Performed by: OBSTETRICS & GYNECOLOGY

## 2021-06-07 PROCEDURE — 94799 UNLISTED PULMONARY SVC/PX: CPT

## 2021-06-07 PROCEDURE — 25010000002 NEOSTIGMINE 10 MG/10ML SOLUTION: Performed by: NURSE ANESTHETIST, CERTIFIED REGISTERED

## 2021-06-07 PROCEDURE — 25010000002 FENTANYL CITRATE (PF) 50 MCG/ML SOLUTION: Performed by: NURSE ANESTHETIST, CERTIFIED REGISTERED

## 2021-06-07 PROCEDURE — 25010000002 DEXAMETHASONE PER 1 MG: Performed by: NURSE ANESTHETIST, CERTIFIED REGISTERED

## 2021-06-07 PROCEDURE — 86901 BLOOD TYPING SEROLOGIC RH(D): CPT | Performed by: OBSTETRICS & GYNECOLOGY

## 2021-06-07 PROCEDURE — 88307 TISSUE EXAM BY PATHOLOGIST: CPT

## 2021-06-07 PROCEDURE — C1889 IMPLANT/INSERT DEVICE, NOC: HCPCS | Performed by: OBSTETRICS & GYNECOLOGY

## 2021-06-07 PROCEDURE — 86900 BLOOD TYPING SEROLOGIC ABO: CPT | Performed by: OBSTETRICS & GYNECOLOGY

## 2021-06-07 PROCEDURE — 0TJB8ZZ INSPECTION OF BLADDER, VIA NATURAL OR ARTIFICIAL OPENING ENDOSCOPIC: ICD-10-PCS | Performed by: OBSTETRICS & GYNECOLOGY

## 2021-06-07 PROCEDURE — 25010000002 HYDROMORPHONE 1 MG/ML SOLUTION: Performed by: NURSE ANESTHETIST, CERTIFIED REGISTERED

## 2021-06-07 PROCEDURE — 25010000002 ONDANSETRON PER 1 MG: Performed by: OBSTETRICS & GYNECOLOGY

## 2021-06-07 PROCEDURE — 99406 BEHAV CHNG SMOKING 3-10 MIN: CPT

## 2021-06-07 PROCEDURE — S0260 H&P FOR SURGERY: HCPCS | Performed by: OBSTETRICS & GYNECOLOGY

## 2021-06-07 PROCEDURE — 25010000002 PROPOFOL 10 MG/ML EMULSION: Performed by: NURSE ANESTHETIST, CERTIFIED REGISTERED

## 2021-06-07 PROCEDURE — 25010000002 ONDANSETRON PER 1 MG: Performed by: NURSE ANESTHETIST, CERTIFIED REGISTERED

## 2021-06-07 PROCEDURE — 0UT70ZZ RESECTION OF BILATERAL FALLOPIAN TUBES, OPEN APPROACH: ICD-10-PCS | Performed by: OBSTETRICS & GYNECOLOGY

## 2021-06-07 PROCEDURE — 25010000002 MIDAZOLAM PER 1 MG: Performed by: NURSE ANESTHETIST, CERTIFIED REGISTERED

## 2021-06-07 PROCEDURE — 58150 TOTAL HYSTERECTOMY: CPT | Performed by: SPECIALIST/TECHNOLOGIST, OTHER

## 2021-06-07 PROCEDURE — 58150 TOTAL HYSTERECTOMY: CPT | Performed by: OBSTETRICS & GYNECOLOGY

## 2021-06-07 PROCEDURE — 25010000002 SUCCINYLCHOLINE PER 20 MG: Performed by: NURSE ANESTHETIST, CERTIFIED REGISTERED

## 2021-06-07 PROCEDURE — 0UT90ZZ RESECTION OF UTERUS, OPEN APPROACH: ICD-10-PCS | Performed by: OBSTETRICS & GYNECOLOGY

## 2021-06-07 PROCEDURE — 25010000003 METHYLENE BLUE 50 MG/10ML SOLUTION: Performed by: OBSTETRICS & GYNECOLOGY

## 2021-06-07 DEVICE — HEMOST ABS SURGICEL PWDR 3GM: Type: IMPLANTABLE DEVICE | Site: ABDOMEN | Status: FUNCTIONAL

## 2021-06-07 RX ORDER — ROCURONIUM BROMIDE 10 MG/ML
INJECTION, SOLUTION INTRAVENOUS AS NEEDED
Status: DISCONTINUED | OUTPATIENT
Start: 2021-06-07 | End: 2021-06-07 | Stop reason: SURG

## 2021-06-07 RX ORDER — CEFAZOLIN SODIUM IN 0.9 % NACL 3 G/100 ML
3 INTRAVENOUS SOLUTION, PIGGYBACK (ML) INTRAVENOUS ONCE
Status: COMPLETED | OUTPATIENT
Start: 2021-06-07 | End: 2021-06-07

## 2021-06-07 RX ORDER — SCOLOPAMINE TRANSDERMAL SYSTEM 1 MG/1
1 PATCH, EXTENDED RELEASE TRANSDERMAL
Status: DISCONTINUED | OUTPATIENT
Start: 2021-06-07 | End: 2021-06-09 | Stop reason: HOSPADM

## 2021-06-07 RX ORDER — OXYCODONE HYDROCHLORIDE 5 MG/1
10 TABLET ORAL EVERY 4 HOURS PRN
Status: DISCONTINUED | OUTPATIENT
Start: 2021-06-07 | End: 2021-06-09 | Stop reason: HOSPADM

## 2021-06-07 RX ORDER — SODIUM CHLORIDE, SODIUM GLUCONATE, SODIUM ACETATE, POTASSIUM CHLORIDE AND MAGNESIUM CHLORIDE 526; 502; 368; 37; 30 MG/100ML; MG/100ML; MG/100ML; MG/100ML; MG/100ML
INJECTION, SOLUTION INTRAVENOUS CONTINUOUS PRN
Status: DISCONTINUED | OUTPATIENT
Start: 2021-06-07 | End: 2021-06-07 | Stop reason: SURG

## 2021-06-07 RX ORDER — SODIUM CHLORIDE, SODIUM LACTATE, POTASSIUM CHLORIDE, CALCIUM CHLORIDE 600; 310; 30; 20 MG/100ML; MG/100ML; MG/100ML; MG/100ML
125 INJECTION, SOLUTION INTRAVENOUS CONTINUOUS
Status: DISCONTINUED | OUTPATIENT
Start: 2021-06-07 | End: 2021-06-08

## 2021-06-07 RX ORDER — NEOSTIGMINE METHYLSULFATE 1 MG/ML
INJECTION, SOLUTION INTRAVENOUS AS NEEDED
Status: DISCONTINUED | OUTPATIENT
Start: 2021-06-07 | End: 2021-06-07 | Stop reason: SURG

## 2021-06-07 RX ORDER — METOPROLOL TARTRATE 50 MG/1
50 TABLET, FILM COATED ORAL EVERY 12 HOURS SCHEDULED
Status: DISCONTINUED | OUTPATIENT
Start: 2021-06-07 | End: 2021-06-09 | Stop reason: HOSPADM

## 2021-06-07 RX ORDER — SUCCINYLCHOLINE CHLORIDE 20 MG/ML
INJECTION INTRAMUSCULAR; INTRAVENOUS AS NEEDED
Status: DISCONTINUED | OUTPATIENT
Start: 2021-06-07 | End: 2021-06-07 | Stop reason: SURG

## 2021-06-07 RX ORDER — POLYETHYLENE GLYCOL 3350 17 G/17G
17 POWDER, FOR SOLUTION ORAL DAILY PRN
Status: DISCONTINUED | OUTPATIENT
Start: 2021-06-07 | End: 2021-06-09 | Stop reason: HOSPADM

## 2021-06-07 RX ORDER — SODIUM CHLORIDE 0.9 % (FLUSH) 0.9 %
3 SYRINGE (ML) INJECTION EVERY 12 HOURS SCHEDULED
Status: DISCONTINUED | OUTPATIENT
Start: 2021-06-07 | End: 2021-06-07 | Stop reason: HOSPADM

## 2021-06-07 RX ORDER — ALBUTEROL SULFATE 2.5 MG/3ML
2.5 SOLUTION RESPIRATORY (INHALATION) ONCE AS NEEDED
Status: DISCONTINUED | OUTPATIENT
Start: 2021-06-07 | End: 2021-06-07 | Stop reason: HOSPADM

## 2021-06-07 RX ORDER — SODIUM CHLORIDE, SODIUM LACTATE, POTASSIUM CHLORIDE, CALCIUM CHLORIDE 600; 310; 30; 20 MG/100ML; MG/100ML; MG/100ML; MG/100ML
125 INJECTION, SOLUTION INTRAVENOUS CONTINUOUS
Status: DISCONTINUED | OUTPATIENT
Start: 2021-06-07 | End: 2021-06-07 | Stop reason: SDUPTHER

## 2021-06-07 RX ORDER — SODIUM CHLORIDE 0.9 % (FLUSH) 0.9 %
10 SYRINGE (ML) INJECTION AS NEEDED
Status: DISCONTINUED | OUTPATIENT
Start: 2021-06-07 | End: 2021-06-07 | Stop reason: HOSPADM

## 2021-06-07 RX ORDER — BISACODYL 10 MG
10 SUPPOSITORY, RECTAL RECTAL DAILY PRN
Status: DISCONTINUED | OUTPATIENT
Start: 2021-06-07 | End: 2021-06-09 | Stop reason: HOSPADM

## 2021-06-07 RX ORDER — BUPIVACAINE HYDROCHLORIDE 2.5 MG/ML
INJECTION, SOLUTION EPIDURAL; INFILTRATION; INTRACAUDAL AS NEEDED
Status: DISCONTINUED | OUTPATIENT
Start: 2021-06-07 | End: 2021-06-07 | Stop reason: HOSPADM

## 2021-06-07 RX ORDER — ACETAMINOPHEN 500 MG
1000 TABLET ORAL EVERY 8 HOURS
Status: DISCONTINUED | OUTPATIENT
Start: 2021-06-07 | End: 2021-06-09 | Stop reason: HOSPADM

## 2021-06-07 RX ORDER — ONDANSETRON 2 MG/ML
4 INJECTION INTRAMUSCULAR; INTRAVENOUS ONCE AS NEEDED
Status: DISCONTINUED | OUTPATIENT
Start: 2021-06-07 | End: 2021-06-07 | Stop reason: HOSPADM

## 2021-06-07 RX ORDER — AMLODIPINE BESYLATE 10 MG/1
10 TABLET ORAL DAILY
Status: DISCONTINUED | OUTPATIENT
Start: 2021-06-08 | End: 2021-06-09 | Stop reason: HOSPADM

## 2021-06-07 RX ORDER — PROPOFOL 10 MG/ML
VIAL (ML) INTRAVENOUS AS NEEDED
Status: DISCONTINUED | OUTPATIENT
Start: 2021-06-07 | End: 2021-06-07 | Stop reason: SURG

## 2021-06-07 RX ORDER — LIDOCAINE HYDROCHLORIDE 20 MG/ML
INJECTION, SOLUTION INFILTRATION; PERINEURAL AS NEEDED
Status: DISCONTINUED | OUTPATIENT
Start: 2021-06-07 | End: 2021-06-07 | Stop reason: SURG

## 2021-06-07 RX ORDER — ASPIRIN 81 MG/1
81 TABLET ORAL EVERY OTHER DAY
Status: DISCONTINUED | OUTPATIENT
Start: 2021-06-08 | End: 2021-06-09 | Stop reason: HOSPADM

## 2021-06-07 RX ORDER — MIDAZOLAM HYDROCHLORIDE 1 MG/ML
INJECTION INTRAMUSCULAR; INTRAVENOUS AS NEEDED
Status: DISCONTINUED | OUTPATIENT
Start: 2021-06-07 | End: 2021-06-07 | Stop reason: SURG

## 2021-06-07 RX ORDER — ONDANSETRON 4 MG/1
4 TABLET, FILM COATED ORAL EVERY 6 HOURS PRN
Status: DISCONTINUED | OUTPATIENT
Start: 2021-06-07 | End: 2021-06-09 | Stop reason: HOSPADM

## 2021-06-07 RX ORDER — ONDANSETRON 2 MG/ML
INJECTION INTRAMUSCULAR; INTRAVENOUS AS NEEDED
Status: DISCONTINUED | OUTPATIENT
Start: 2021-06-07 | End: 2021-06-07 | Stop reason: SURG

## 2021-06-07 RX ORDER — DEXAMETHASONE SODIUM PHOSPHATE 4 MG/ML
INJECTION, SOLUTION INTRA-ARTICULAR; INTRALESIONAL; INTRAMUSCULAR; INTRAVENOUS; SOFT TISSUE AS NEEDED
Status: DISCONTINUED | OUTPATIENT
Start: 2021-06-07 | End: 2021-06-07 | Stop reason: SURG

## 2021-06-07 RX ORDER — FENTANYL CITRATE 50 UG/ML
INJECTION, SOLUTION INTRAMUSCULAR; INTRAVENOUS AS NEEDED
Status: DISCONTINUED | OUTPATIENT
Start: 2021-06-07 | End: 2021-06-07 | Stop reason: SURG

## 2021-06-07 RX ORDER — PROMETHAZINE HYDROCHLORIDE 12.5 MG/1
12.5 SUPPOSITORY RECTAL EVERY 6 HOURS PRN
Status: DISCONTINUED | OUTPATIENT
Start: 2021-06-07 | End: 2021-06-09 | Stop reason: HOSPADM

## 2021-06-07 RX ORDER — PROMETHAZINE HYDROCHLORIDE 12.5 MG/1
12.5 TABLET ORAL EVERY 6 HOURS PRN
Status: DISCONTINUED | OUTPATIENT
Start: 2021-06-07 | End: 2021-06-09 | Stop reason: HOSPADM

## 2021-06-07 RX ORDER — KETOROLAC TROMETHAMINE 30 MG/ML
30 INJECTION, SOLUTION INTRAMUSCULAR; INTRAVENOUS EVERY 6 HOURS
Status: DISCONTINUED | OUTPATIENT
Start: 2021-06-07 | End: 2021-06-08

## 2021-06-07 RX ORDER — LOSARTAN POTASSIUM 50 MG/1
100 TABLET ORAL DAILY
Status: DISCONTINUED | OUTPATIENT
Start: 2021-06-07 | End: 2021-06-09 | Stop reason: HOSPADM

## 2021-06-07 RX ORDER — OXYCODONE HYDROCHLORIDE 5 MG/1
5 TABLET ORAL EVERY 4 HOURS PRN
Status: DISCONTINUED | OUTPATIENT
Start: 2021-06-07 | End: 2021-06-09 | Stop reason: HOSPADM

## 2021-06-07 RX ORDER — MEPERIDINE HYDROCHLORIDE 25 MG/ML
12.5 INJECTION INTRAMUSCULAR; INTRAVENOUS; SUBCUTANEOUS
Status: DISCONTINUED | OUTPATIENT
Start: 2021-06-07 | End: 2021-06-07 | Stop reason: HOSPADM

## 2021-06-07 RX ORDER — ONDANSETRON 2 MG/ML
4 INJECTION INTRAMUSCULAR; INTRAVENOUS EVERY 6 HOURS PRN
Status: DISCONTINUED | OUTPATIENT
Start: 2021-06-07 | End: 2021-06-09 | Stop reason: HOSPADM

## 2021-06-07 RX ORDER — NALOXONE HCL 0.4 MG/ML
0.1 VIAL (ML) INJECTION
Status: DISCONTINUED | OUTPATIENT
Start: 2021-06-07 | End: 2021-06-09 | Stop reason: HOSPADM

## 2021-06-07 RX ADMIN — ONDANSETRON HYDROCHLORIDE 4 MG: 2 INJECTION INTRAMUSCULAR; INTRAVENOUS at 13:15

## 2021-06-07 RX ADMIN — ROCURONIUM BROMIDE 40 MG: 50 INJECTION INTRAVENOUS at 11:14

## 2021-06-07 RX ADMIN — OXYCODONE 10 MG: 5 TABLET ORAL at 15:54

## 2021-06-07 RX ADMIN — SODIUM CHLORIDE, POTASSIUM CHLORIDE, SODIUM LACTATE AND CALCIUM CHLORIDE 125 ML/HR: 600; 310; 30; 20 INJECTION, SOLUTION INTRAVENOUS at 14:41

## 2021-06-07 RX ADMIN — ONDANSETRON 4 MG: 2 INJECTION INTRAMUSCULAR; INTRAVENOUS at 20:06

## 2021-06-07 RX ADMIN — LIDOCAINE HYDROCHLORIDE 100 MG: 20 INJECTION, SOLUTION INFILTRATION; PERINEURAL at 11:05

## 2021-06-07 RX ADMIN — CEFAZOLIN 3 G: 1 INJECTION, POWDER, FOR SOLUTION INTRAMUSCULAR; INTRAVENOUS; PARENTERAL at 11:11

## 2021-06-07 RX ADMIN — ACETAMINOPHEN 1000 MG: 500 TABLET, FILM COATED ORAL at 15:54

## 2021-06-07 RX ADMIN — OXYCODONE 10 MG: 5 TABLET ORAL at 20:17

## 2021-06-07 RX ADMIN — HYDROMORPHONE HYDROCHLORIDE 0.5 MG: 1 INJECTION, SOLUTION INTRAMUSCULAR; INTRAVENOUS; SUBCUTANEOUS at 13:39

## 2021-06-07 RX ADMIN — ROCURONIUM BROMIDE 15 MG: 50 INJECTION INTRAVENOUS at 12:18

## 2021-06-07 RX ADMIN — HYDROMORPHONE HYDROCHLORIDE 0.5 MG: 1 INJECTION, SOLUTION INTRAMUSCULAR; INTRAVENOUS; SUBCUTANEOUS at 12:54

## 2021-06-07 RX ADMIN — SUCCINYLCHOLINE CHLORIDE 160 MG: 20 INJECTION, SOLUTION INTRAMUSCULAR; INTRAVENOUS at 11:05

## 2021-06-07 RX ADMIN — SODIUM CHLORIDE, SODIUM GLUCONATE, SODIUM ACETATE, POTASSIUM CHLORIDE AND MAGNESIUM CHLORIDE: 526; 502; 368; 37; 30 INJECTION, SOLUTION INTRAVENOUS at 12:05

## 2021-06-07 RX ADMIN — SCOLOPAMINE TRANSDERMAL SYSTEM 1 PATCH: 1 PATCH, EXTENDED RELEASE TRANSDERMAL at 13:23

## 2021-06-07 RX ADMIN — SODIUM CHLORIDE, POTASSIUM CHLORIDE, SODIUM LACTATE AND CALCIUM CHLORIDE 125 ML/HR: 600; 310; 30; 20 INJECTION, SOLUTION INTRAVENOUS at 08:27

## 2021-06-07 RX ADMIN — METOPROLOL TARTRATE 50 MG: 50 TABLET, FILM COATED ORAL at 20:55

## 2021-06-07 RX ADMIN — HYDROMORPHONE HYDROCHLORIDE 0.5 MG: 1 INJECTION, SOLUTION INTRAMUSCULAR; INTRAVENOUS; SUBCUTANEOUS at 13:24

## 2021-06-07 RX ADMIN — ACETAMINOPHEN 1000 MG: 500 TABLET, FILM COATED ORAL at 23:29

## 2021-06-07 RX ADMIN — HYDROMORPHONE HYDROCHLORIDE 0.5 MG: 1 INJECTION, SOLUTION INTRAMUSCULAR; INTRAVENOUS; SUBCUTANEOUS at 11:44

## 2021-06-07 RX ADMIN — DEXAMETHASONE SODIUM PHOSPHATE 4 MG: 4 INJECTION, SOLUTION INTRAMUSCULAR; INTRAVENOUS at 12:46

## 2021-06-07 RX ADMIN — NEOSTIGMINE METHYLSULFATE 4 MG: 1 INJECTION, SOLUTION INTRAVENOUS at 12:46

## 2021-06-07 RX ADMIN — LOSARTAN POTASSIUM 100 MG: 50 TABLET, FILM COATED ORAL at 15:55

## 2021-06-07 RX ADMIN — ROCURONIUM BROMIDE 10 MG: 50 INJECTION INTRAVENOUS at 11:06

## 2021-06-07 RX ADMIN — PROPOFOL 200 MG: 10 INJECTION, EMULSION INTRAVENOUS at 11:05

## 2021-06-07 RX ADMIN — MIDAZOLAM HYDROCHLORIDE 2 MG: 2 INJECTION, SOLUTION INTRAMUSCULAR; INTRAVENOUS at 10:58

## 2021-06-07 RX ADMIN — GLYCOPYRROLATE 0.8 MG: 0.2 INJECTION, SOLUTION INTRAMUSCULAR; INTRAVITREAL at 12:46

## 2021-06-07 RX ADMIN — DEXAMETHASONE SODIUM PHOSPHATE 4 MG: 4 INJECTION, SOLUTION INTRAMUSCULAR; INTRAVENOUS at 13:15

## 2021-06-07 RX ADMIN — HYDROMORPHONE HYDROCHLORIDE 0.5 MG: 1 INJECTION, SOLUTION INTRAMUSCULAR; INTRAVENOUS; SUBCUTANEOUS at 13:56

## 2021-06-07 RX ADMIN — ONDANSETRON HYDROCHLORIDE 4 MG: 2 INJECTION INTRAMUSCULAR; INTRAVENOUS at 12:46

## 2021-06-07 RX ADMIN — FENTANYL CITRATE 100 MCG: 50 INJECTION INTRAMUSCULAR; INTRAVENOUS at 11:05

## 2021-06-07 RX ADMIN — SODIUM CHLORIDE, POTASSIUM CHLORIDE, SODIUM LACTATE AND CALCIUM CHLORIDE 125 ML/HR: 600; 310; 30; 20 INJECTION, SOLUTION INTRAVENOUS at 22:31

## 2021-06-07 NOTE — ANESTHESIA PROCEDURE NOTES
Airway  Urgency: elective    Date/Time: 6/7/2021 11:12 AM  Airway not difficult    General Information and Staff    Patient location during procedure: OR    Indications and Patient Condition  Indications for airway management: airway protection    Preoxygenated: yes  MILS maintained throughout  Mask difficulty assessment: 0 - not attempted    Final Airway Details  Final airway type: endotracheal airway      Successful airway: ETT  Cuffed: yes   Successful intubation technique: direct laryngoscopy  Facilitating devices/methods: Bougie, intubating stylet and cricoid pressure  Endotracheal tube insertion site: oral  Blade: Marco  Blade size: 3  ETT size (mm): 7.5  Cormack-Lehane Classification: grade III - view of epiglottis only  Placement verified by: chest auscultation, capnometry and palpation of cuff   Cuff volume (mL): 8  Measured from: lips  ETT/EBT  to lips (cm): 22  Number of attempts at approach: 1  Assessment: lips, teeth, and gum same as pre-op and atraumatic intubation

## 2021-06-07 NOTE — ANESTHESIA POSTPROCEDURE EVALUATION
Patient: Marta Chambers    Procedure Summary     Date: 06/07/21 Room / Location: [facility] OR [address] [place] [address] OR    Anesthesia Start: 1101 Anesthesia Stop: 1316    Procedure: TOTAL ABDOMINAL HYSTERECTOMY, bilateral salpingectomy, cystoscopy (Bilateral Abdomen) Diagnosis:       Pelvic pain      Dysmenorrhea      Intramural leiomyoma of uterus      Abnormal uterine bleeding (AUB)      Menorrhagia with regular cycle      (Pelvic pain [R10.2])      (Dysmenorrhea [N94.6])      (Intramural leiomyoma of uterus [D25.1])      (Abnormal uterine bleeding (AUB) [N93.9])      (Menorrhagia with regular cycle [N92.0])    Surgeons: Bo Mccallum DO Provider: Verona Akhtar CRNA    Anesthesia Type: general ASA Status: 3          Anesthesia Type: general    Vitals  No vitals data found for the desired time range.          Post Anesthesia Care and Evaluation    Patient location during evaluation: bedside  Patient participation: complete - patient participated  Level of consciousness: awake  Pain score: 0  Pain management: satisfactory to patient  Airway patency: patent  Anesthetic complications: No anesthetic complications  PONV Status: none (complains of nausea in PACU, meds given and scope patcho ordered)  Cardiovascular status: acceptable  Respiratory status: acceptable  Hydration status: acceptable    Comments: 120/60, 80 20 96%.

## 2021-06-07 NOTE — OP NOTE
OPERATIVE NOTE  Marta Yee Day  1980  6/7/2021    PREOP DIAGNOSES:  Pelvic pain [R10.2]  Dysmenorrhea [N94.6]  Intramural leiomyoma of uterus [D25.1]  Abnormal uterine bleeding (AUB) [N93.9]  Menorrhagia with regular cycle [N92.0]    POSTOP DIAGNOSES:  Post-Op Diagnosis Codes:     * Pelvic pain [R10.2]     * Dysmenorrhea [N94.6]     * Intramural leiomyoma of uterus [D25.1]     * Abnormal uterine bleeding (AUB) [N93.9]     * Menorrhagia with regular cycle [N92.0]    Procedure(s):  TOTAL ABDOMINAL HYSTERECTOMY, bilateral salpingectomy, cystoscopy    SURGEON: Bo Mccallum DO, FACOG    Assistant: Ling Choudhury CSA was responsible for performing the following activities: Retraction, Suction, Irrigation, Suturing and Closing and their skilled assistance was necessary for the success of this case.     STAFF:   Circulator: Hermelinda Ascencio RN; Stacey Jean-Baptiste RN; Stevan Rojo RN  Scrub Person: Trena Barker; Kita Marshall  Assistant: Ling Choudhury CSA    ANESTHESIA: Choice    ANESTHESIA STAFF:  CRNA: Verona Akhtar CRNA  Student Nurse Anesthetist: Silvia Rascon SRNA    ESTIMATED BLOOD LOSS: 100 ml     SPECIMEN:   ID Type Source Tests Collected by Time   A (Not marked as sent) : Uterus, cervix, bilateral tubes and fibroids Tissue Uterus, Cervix, Bilateral Fallopian Tubes  TISSUE PATHOLOGY EXAM Bo Mccallum DO 6/7/2021 1245       FINDINGS: Normal-appearing ovaries bilaterally significant Garland enlarged uterus approximately 16 weeks size with large 4 cm fibroid in the left broad ligament, normal-appearing tubes.  Normal-appearing bladder with no injuries noted E flux noted from bilateral ureteral orifices.    COMPLICATIONS: None    DESCRIPTION OF OPERATION: After obtaining informed consent the patient was taken the operating room where general endotracheal anesthesia was found to be adequate she was then prepped and draped in the normal sterile fashion in the low lithotomy position  a final timeout was performed and preoperative antibiotics were given.  A Pfannenstiel skin incision was made and carried through to the underlying layer of fascia the fascia was incised in the midline.  The incision on the rectus fascia was then extended laterally with traction.  The rectus muscles were then  in the midline and the peritoneum was entered digitally.  The peritoneal incision was extended superiorly inferiorly with Bovie cautery.  An O'Jai-O'Rdz retractor was then placed for visualization with findings as noted above.  The fundus of the uterus was grasped with a thyroid Sotero clamp and elevated.  The left utero-ovarian ligament was then cauterized and transected and the left fallopian tube was amputated and passed off the operative field.  The left ovary was then allowed to fall out of the operative field.  Attention was then turned to the right and the right round ligament was cauterized and transected using the Enseal device after having been suture-ligated.  The broad ligament was then  into the anterior and posterior sheaths on the right and the anterior sheath was taken down sharply using the Enseal device to approximately the midline to start the right side of the bladder flap.  The right utero-ovarian ligament was then identified cauterized and transected using the Enseal device and the right ovary was allowed to fall out of the operative field.  Attention was turned to the left round ligament which was suture-ligated using 0 Vicryl suture and then cauterized and transected using the Enseal device.  The left broad ligament was  into the anterior and posterior sheaths.  The anterior sheath was taken down sharply using the Enseal device to the midline the remainder of the bladder flap was created and the bladder was pushed out of the operative field using a sponge stick.  With more mobility of the uterus the right uterine artery was able to be visualized it was  cauterized and transected using the Enseal device.  The same procedure was performed on the left.  The remainder of the broad ligament and part of the cardinal ligament was then cauterized and transected first on the right using the Enseal device done on the left.  This was done to the level of the uterosacral ligament.  Sharply curved zeppelin clamps were then placed across the top of the vagina at the level of the external cervical os.  The uterosacral ligaments were incorporated into these clamps.  The uterus and cervix were then amputated and passed off the operative field.  The right vaginal angle was then suture ligated using 0 Vicryl.  Followed by the left.  The vaginal cuff was then closed using 4 interrupted 0 Vicryl sutures.  The pelvis was then copiously irrigated with hemostasis noted.  Surgicel powder was placed over the vaginal cuff to ensure hemostasis.  The suture tails were all cut and the O'Jai-O'Rdz retractor was removed.  The rectus muscles were then reapproximated with 4 interrupted 3-0 plain gut sutures.  The rectus fascia was then closed in a running fashion using 0 Vicryl suture.  The deep subcutaneous tissue was then closed using plain gut and the skin was closed using 3-0 Monocryl and Dermabond.  Attention was then turned to the vagina and a cystoscopy was performed with normal-appearing bladder with no bladder injuries and E flux noted from the bilateral ureteral orifices.  A rectal exam was done with no obvious rectal injuries noted.  Patient tolerated the procedure well sponge lap and needle count were correct x2.  The patient was taken to the recovery room in stable condition.          This document has been electronically signed by Bo Mccallum DO on June 7, 2021 13:00 CDT

## 2021-06-07 NOTE — ANESTHESIA PREPROCEDURE EVALUATION
Anesthesia Evaluation     Patient summary reviewed and Nursing notes reviewed   no history of anesthetic complications:  NPO Solid Status: > 8 hours  NPO Liquid Status: > 8 hours           Airway   Mallampati: III  TM distance: >3 FB  Neck ROM: full  Possible difficult intubation  Dental      Pulmonary     breath sounds clear to auscultation  (+) a smoker Current, decreased breath sounds,   (-) asthma, sleep apnea  Cardiovascular   Exercise tolerance: good (4-7 METS)    ECG reviewed  Patient on routine beta blocker and Beta blocker given within 24 hours of surgery  Rhythm: regular  Rate: normal    (+) hypertension poorly controlled 2 medications or greater, valvular problems/murmurs, dysrhythmias Bradycardia, murmur,   (-) pacemaker, past MI, CAD, CHF, cardiac stents, DVT, hyperlipidemia    ROS comment: EKG 3/24/2021:  Sinus bradycardia  Septal infarct (cited on or before 27-JAN-2020)  Abnormal ECG    TTE 4/16/2021:  · Left ventricular wall thickness is consistent with mild concentric hypertrophy.  · Estimated left ventricular EF = 63% Left ventricular ejection fraction appears to be 61 - 65%. Left ventricular systolic function is normal.  · Left ventricular diastolic function is consistent with (grade I) impaired relaxation.  · The right ventricular cavity is mildly dilated.  · Estimated right ventricular systolic pressure from tricuspid regurgitation is normal (<35 mmHg).  · Mild dilation of the aortic root is present (3.8 cms), Mild dilation of the proximal aorta is present (4.1 cms)    Neuro/Psych  (-) seizures  GI/Hepatic/Renal/Endo    (+) obesity,  GERD well controlled,  renal disease CRI,   (-) morbid obesity, diabetes    Musculoskeletal     Abdominal   (+) obese,    Substance History - negative use     OB/GYN negative ob/gyn ROS   (-)  Pregnant        Other   arthritis,      ROS/Med Hx Other:  Ms. Marta Chambers is a 39 y.o. 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  PROCEDURES PERFORMED:   1. Urgent repair of a type A aortic dissection using a #36 Vascutek  interposition graft to reconstruct the aortic arch as a hemiarch  reconstruction, and a total replacement of the ascending aorta.   2. Resuspension and reconstruction of the aortic root and valve using BioGlue.       Hgb=12.6    BHcg negative    Hx of dissecting aortic arch w/ repair that led to open heart w/ aortic valve repair (2014)- Follows with Dr. Rodríguez    Tubal 2021    GERD controlled on PRN prilosec      Phys Exam Other: Macroglossia- No uvula on mallampati exam. Kimball    Denies anything loose or chipped in mouth                Anesthesia Plan    ASA 3     general   (Kimball due to mallampati 3 & macroglossia)  intravenous induction     Anesthetic plan, all risks, benefits, and alternatives have been provided, discussed and informed consent has been obtained with: patient.  Use of blood products discussed with patient  Consented to blood products.   Plan discussed with CRNA.

## 2021-06-07 NOTE — H&P
Marta Chambers is a 40 y.o. y/o female.     Chief Complaint: Fibroid uterus    HPI:   40 y.o. .  No LMP recorded..  Patient presents for scheduled hysterectomy secondary to enlarged fibroid uterus now desiring definitive management.  Patient has no questions or concerns about surgery at this time.  No changes to her health history.  Patient has gotten cardiac clearance from cardiology.  Did not hear back from nephrology although I feel that surgery is still warranted and will be low risk for her.    I reviewed the risks, benefits, and alternatives of total abdominal hysterectomy with bilateral salpingectomy and cystoscopy today including the small but real risk of mortality. I reviewed the risk of vesicovaginal fistula with incontinence until it can be repaired. I reviewed the risk of damage to ureters with need for additional surgery and possibly nephrectomy. I reviewed the risk of damage to the bowel with possible colostomy and/or sepsis and/or late return to OR.  I discussed the risk of bleeding with the patient and possible risk of blood transfusion and/or late return to OR .  Blood products carry risk of HIV, infection, hepatitis, and transfusion reaction. Patient is willing to accept blood products. She understands the risk of infection in the abdominal wall, pelvis, abdomen and other parts of the body. I reviewed the risk of blood clots in the leg with possible emboli to the lungs. I reviewed how her medical and surgical history affect her risk. I reviewed alternate methods of hysterectomy and why I am recommending abdominal approach and offered referral if desired. I reviewed alternate methods of treatment for her condition. Questions answered at length. Patient expressed understanding and desires to proceed with surgery.     Note from last visit:Patient presents for follow-up on heavy vaginal bleeding pelvic pain and fibroids.  Patient is now desiring definitive management as we have previously  discussed the symptoms.  We will plan for total abdominal hysterectomy with bilateral salpingectomy and cystoscopy on 7 June.  Patient continues to have the same symptoms that she has been having.  Patient also here for endometrial biopsy today this procedure was explained in detail.  Patient comfortable with proceeding.     I reviewed the risks, benefits, and alternatives of total abdominal hysterectomy with bilateral salpingectomy and cystoscopy today including the small but real risk of mortality. I reviewed the risk of vesicovaginal fistula with incontinence until it can be repaired. I reviewed the risk of damage to ureters with need for additional surgery and possibly nephrectomy. I reviewed the risk of damage to the bowel with possible colostomy and/or sepsis and/or late return to OR.  I discussed the risk of bleeding with the patient and possible risk of blood transfusion and/or late return to OR .  Blood products carry risk of HIV, infection, hepatitis, and transfusion reaction. Patient is willing to accept blood products. She understands the risk of infection in the abdominal wall, pelvis, abdomen and other parts of the body. I reviewed the risk of blood clots in the leg with possible emboli to the lungs. I reviewed how her medical and surgical history affect her risk. I reviewed alternate methods of hysterectomy and why I am recommending abdominal approach and offered referral if desired. I reviewed alternate methods of treatment for her condition. Questions answered at length. Patient expressed understanding and desires to proceed with surgery.      Note from last visit:Patient presents to follow-up on fibroid uterus.  Patient is here for repeat ultrasound approximately 3 months after last to verify that there is no significant growth in uterus or fibroids.  Reviewed preliminary results from today and uterus size has increased slightly about 1 cm but no other significant changes fibroid is about the same  size my concern for uterine cancer is low at this point.  Patient does continue to have heavy bleeding and had a very heavy period last month.  Is getting a new nephrologist and will readdress whether this provider is comfortable with her taking progesterone therapy or not.  Patient is still wanting to have surgery in June.  Recommended that she return to see me in April or May to schedule surgery.  I explained to patient that if bleeding gets significantly worse then we may need to do surgery sooner but that will depend on her symptoms.  Patient will wait and see how surgery for her son goes and then make decisions from there.     Review of Systems   Constitutional: Negative for chills, fatigue and fever.   HENT: Negative for sore throat.    Eyes: Negative for visual disturbance.   Respiratory: Negative for cough, shortness of breath and wheezing.    Cardiovascular: Negative for chest pain, palpitations and leg swelling.   Gastrointestinal: Negative for abdominal pain, diarrhea, nausea and vomiting.   Endocrine: Negative for cold intolerance and heat intolerance.   Genitourinary: Positive for pelvic pain and vaginal bleeding. Negative for dysuria, flank pain, frequency, menstrual problem, vaginal discharge and vaginal pain.   Skin: Negative for color change and pallor.   Neurological: Negative for syncope, light-headedness and headaches.   Psychiatric/Behavioral: Negative for dysphoric mood and suicidal ideas. The patient is not nervous/anxious.         The following portions of the patient's history were reviewed and updated as appropriate: allergies, current medications, past family history, past medical history, past social history, past surgical history and problem list.    No Known Allergies     Prior to Admission medications    Medication Sig Start Date End Date Taking? Authorizing Provider   amLODIPine (NORVASC) 10 MG tablet Take 1 tablet by mouth Daily. 9/18/20   Katerina Solis MD   aspirin  (aspirin) 81 MG EC tablet Take 81 mg by mouth Every Other Day.    ProviderMarleen MD   ferrous sulfate 325 (65 FE) MG tablet Take 325 mg by mouth Every Night.    Marleen Bravo MD   fluticasone (Flonase) 50 MCG/ACT nasal spray 2 sprays into the nostril(s) as directed by provider Daily.    Marleen Bravo MD   hydrocortisone (ANUSOL-HC) 25 MG suppository Insert 1 suppository into the rectum 2 (Two) Times a Day As Needed for Hemorrhoids. 3/16/21   Elizabeth Diaz APRN   losartan (COZAAR) 100 MG tablet Take 100 mg by mouth Daily. 1/2 tablet in the morning and 1/2 tablet at night    Marleen Bravo MD   metoprolol tartrate (LOPRESSOR) 50 MG tablet Take 50 mg by mouth 2 (Two) Times a Day.    Marleen Bravo MD   Pediatric Multivitamins-Iron (CHILDRENS MULTIVITAMIN/IRON PO) Take  by mouth Daily.    Marleen Bravo MD   triamcinolone (KENALOG) 0.1 % cream Apply  topically to the appropriate area as directed As Needed. 9/14/20   Marleen Bravo MD   vitamin D (ERGOCALCIFEROL) 1.25 MG (39800 UT) capsule capsule Take 1 capsule by mouth 1 (One) Time Per Week.  Patient taking differently: Take 50,000 Units by mouth 1 (One) Time Per Week. thursday 5/28/21   Marco A Stanley MD        The patient has a family history of   Family History   Problem Relation Age of Onset   • Sudden death Father         cardiac   • Other Father         ruptured aortic aneurysm   • Aortic dissection Father    • Diabetes Other    • Hypertension Other    • Diabetes Mother    • Hypertension Mother    • COPD Mother    • Asthma Brother    • Hypertension Sister         Past Medical History:   Diagnosis Date   • Acute renal failure (CMS/HCC)    • Anemia    • Aneurysm, aortic (CMS/HCC)    • Aortic dissection (CMS/HCC) 2014   • Arthritis    • Chest pain    • GERD (gastroesophageal reflux disease)    • Hemorrhoids    • Hypertension    • Leaky heart valve    • Obesity    • Sjogren's disease (CMS/HCC)    • Stage  "3 chronic kidney disease (CMS/HCC)    • Uterine leiomyoma 2020        OB History        2    Para   2    Term   2            AB        Living   2       SAB        TAB        Ectopic        Molar        Multiple        Live Births   2                 Social History     Socioeconomic History   • Marital status: Single     Spouse name: Not on file   • Number of children: Not on file   • Years of education: Not on file   • Highest education level: Not on file   Tobacco Use   • Smoking status: Current Every Day Smoker     Packs/day: 1.00     Years: 24.00     Pack years: 24.00     Types: Cigarettes   • Smokeless tobacco: Never Used   Vaping Use   • Vaping Use: Every day   • Substances: Flavoring   Substance and Sexual Activity   • Alcohol use: Yes     Comment: social   • Drug use: No   • Sexual activity: Defer        Past Surgical History:   Procedure Laterality Date   • ASCENDING ARCH/HEMIARCH REPLACEMENT     • CARDIAC VALVE SURGERY     • HAND SURGERY Left     secondary to trauma   • TUBAL ABDOMINAL LIGATION          Patient Active Problem List   Diagnosis   • Aortic dissection, thoracic (CMS/HCC)   • H/O aortic arch repair   • History of aortic root repair   • CKD (chronic kidney disease)   • Essential hypertension   • Sjogren's syndrome (CMS/HCC)   • Chronic renal insufficiency, stage III (moderate) (CMS/HCC)   • Uterine leiomyoma   • Dysmenorrhea   • Pelvic pain   • Nonrheumatic aortic valve disorder   • Intramural leiomyoma of uterus   • Abnormal uterine bleeding (AUB)   • Menorrhagia with regular cycle        Documented Vitals    21 0816   BP: 174/85   Pulse: 68   Resp: 20   Temp: 97.7 °F (36.5 °C)   SpO2: 100%   Weight: 124 kg (273 lb 9.5 oz)   Height: 182.9 cm (72\")        Body mass index is 37.11 kg/m².    Physical Exam  Vitals and nursing note reviewed.   Constitutional:       General: She is not in acute distress.     Appearance: Normal appearance. She is well-developed. She is not " ill-appearing, toxic-appearing or diaphoretic.   HENT:      Head: Normocephalic.      Mouth/Throat:      Mouth: Mucous membranes are moist.   Neck:      Thyroid: No thyromegaly.   Cardiovascular:      Rate and Rhythm: Normal rate and regular rhythm.      Heart sounds: Normal heart sounds. No murmur heard.     Pulmonary:      Effort: Pulmonary effort is normal. No respiratory distress.      Breath sounds: Normal breath sounds. No wheezing.   Abdominal:      General: Bowel sounds are normal. There is no distension.      Palpations: Abdomen is soft. There is no mass.      Tenderness: There is no abdominal tenderness. There is no guarding or rebound.   Genitourinary:     Vagina: Normal.   Musculoskeletal:         General: No swelling, tenderness or deformity. Normal range of motion.      Cervical back: Normal range of motion.   Skin:     General: Skin is warm and dry.      Findings: No erythema.   Neurological:      General: No focal deficit present.      Mental Status: She is alert and oriented to person, place, and time.   Psychiatric:         Mood and Affect: Mood normal.         Behavior: Behavior normal.         Thought Content: Thought content normal.         Judgment: Judgment normal.         Laboratory Data:   Results for COLLEEN MALIK (MRN [id number]) as of 6/7/2021 10:38   Ref. Range 6/4/2021 10:25 6/7/2021 08:30   ABO Type Unknown  O   RH type Unknown  Positive   Antibody Screen Unknown  Negative   T&S Expiration Date Unknown  6/10/2021 11:59:59 PM   Glucose Latest Ref Range: 65 - 99 mg/dL 69    Sodium Latest Ref Range: 136 - 145 mmol/L 140    Potassium Latest Ref Range: 3.5 - 5.2 mmol/L 4.8    CO2 Latest Ref Range: 22.0 - 29.0 mmol/L 24.0    Chloride Latest Ref Range: 98 - 107 mmol/L 111 (H)    Anion Gap Latest Ref Range: 5.0 - 15.0 mmol/L 5.0    Creatinine Latest Ref Range: 0.57 - 1.00 mg/dL 2.08 (H)    BUN Latest Ref Range: 6 - 20 mg/dL 25 (H)    BUN/Creatinine Ratio Latest Ref Range: 7.0 - 25.0   12.0    Calcium Latest Ref Range: 8.6 - 10.5 mg/dL 9.4    eGFR  Am Latest Ref Range: >60 mL/min/1.73 32 (L)    HCG Qualitative Latest Ref Range: Negative  Negative    WBC Latest Ref Range: 3.40 - 10.80 10*3/mm3 5.64    RBC Latest Ref Range: 3.77 - 5.28 10*6/mm3 4.23    Hemoglobin Latest Ref Range: 12.0 - 15.9 g/dL 12.6    Hematocrit Latest Ref Range: 34.0 - 46.6 % 39.2    RDW Latest Ref Range: 12.3 - 15.4 % 14.6    MCV Latest Ref Range: 79.0 - 97.0 fL 92.7    MCH Latest Ref Range: 26.6 - 33.0 pg 29.8    MCHC Latest Ref Range: 31.5 - 35.7 g/dL 32.1    MPV Latest Ref Range: 6.0 - 12.0 fL 11.4    Platelets Latest Ref Range: 140 - 450 10*3/mm3 201    RDW-SD Latest Ref Range: 37.0 - 54.0 fl 49.7    Neutrophil Rel % Latest Ref Range: 42.7 - 76.0 % 65.4    Lymphocyte Rel % Latest Ref Range: 19.6 - 45.3 % 21.8    Monocyte Rel % Latest Ref Range: 5.0 - 12.0 % 9.9    Eosinophil Rel % Latest Ref Range: 0.3 - 6.2 % 2.1    Basophil Rel % Latest Ref Range: 0.0 - 1.5 % 0.4    Immature Granulocyte Rel % Latest Ref Range: 0.0 - 0.5 % 0.4    Neutrophils Absolute Latest Ref Range: 1.70 - 7.00 10*3/mm3 3.69    Lymphocytes Absolute Latest Ref Range: 0.70 - 3.10 10*3/mm3 1.23    Monocytes Absolute Latest Ref Range: 0.10 - 0.90 10*3/mm3 0.56    Eosinophils Absolute Latest Ref Range: 0.00 - 0.40 10*3/mm3 0.12    Basophils Absolute Latest Ref Range: 0.00 - 0.20 10*3/mm3 0.02    Immature Grans, Absolute Latest Ref Range: 0.00 - 0.05 10*3/mm3 0.02    nRBC Latest Ref Range: 0.0 - 0.2 /100 WBC 0.0    HCG, Urine QL Latest Ref Range: Negative   Negative       Study Result    Narrative & Impression   PROCEDURE: US PELVIS     Clinical History: fibroid, R10.2 Pelvic and perineal pain D25.1  Intramural leiomyoma of uterus     Indication: Same as above.     Comparison: 7/3/2020 .     TECHNIQUE:      A transabdominal pelvic ultrasound was done, followed by Doppler  evaluation of the ovaries.     FINDINGS:      The uterus measures measures 13.6 x  7.3 x 8.7 cm. The uterine  volume is 452.16 mL     Multiple uterine fibroids are seen, with the largest fibroid seen  in the uterine fundus and measuring 8.8 x 5.1 x 6.6 cm (previous  measurement on 7/3/2020 was 7.4 x 6.3 x 7.0 cm)     The endometrium measures 23.0 mm., in double layer thickness and  is thickened. This could be due to endometrial polyps,  endometrial hyperplasia or endometrial malignancy.        The right ovary measures 5.7 x 3.0 x 4.6 centimeters. The right  ovarian  volume is 41.84 mL.     The left ovary is not visualized. In the right ovary there is a  septated, indeterminate cyst measuring 3.0 x 4.6 x 3.1 cm and can  be followed up in 6-12 weeks interval     There are no adnexal masses.     There is no free fluid in the pelvis.      IMPRESSION:  Multiple uterine fibroids are seen, with the largest fibroid seen  in the uterine fundus and measuring 8.8 x 5.1 x 6.6 cm (previous  measurement on 7/3/2020 was 7.4 x 6.3 x 7.0 cm)  The endometrium measures 23.0 mm., in double layer thickness and  is thickened. This could be due to endometrial polyps,  endometrial hyperplasia or endometrial malignancy.     The left ovary is not visualized.   In the right ovary there is a septated, indeterminate cyst  measuring 3.0 x 4.6 x 3.1 cm and can be followed up in 6-12 weeks  interval            Assessment/Plan   Diagnoses and all orders for this visit:    1. Pelvic pain  -     lactated ringers infusion  -     sodium chloride 0.9 % flush 10 mL  -     sodium chloride 0.9 % flush 3 mL  -     ceFAZolin in Sodium Chloride (ANCEF) IVPB solution 3 g    2. Dysmenorrhea  -     lactated ringers infusion  -     sodium chloride 0.9 % flush 10 mL  -     sodium chloride 0.9 % flush 3 mL  -     ceFAZolin in Sodium Chloride (ANCEF) IVPB solution 3 g    3. Intramural leiomyoma of uterus  -     lactated ringers infusion  -     sodium chloride 0.9 % flush 10 mL  -     sodium chloride 0.9 % flush 3 mL  -     ceFAZolin in Sodium  Chloride (ANCEF) IVPB solution 3 g    4. Abnormal uterine bleeding (AUB)  -     lactated ringers infusion  -     sodium chloride 0.9 % flush 10 mL  -     sodium chloride 0.9 % flush 3 mL  -     ceFAZolin in Sodium Chloride (ANCEF) IVPB solution 3 g    5. Menorrhagia with regular cycle  -     lactated ringers infusion  -     sodium chloride 0.9 % flush 10 mL  -     sodium chloride 0.9 % flush 3 mL  -     ceFAZolin in Sodium Chloride (ANCEF) IVPB solution 3 g    Other orders  -     Type & Screen; Standing  -     Follow Anesthesia Guidelines / Standing Orders; Standing  -     Insert Peripheral IV; Standing  -     Saline Lock & Maintain IV Access; Standing  -     Pregnancy, Urine -; Standing  -     Obtain Informed Consent; Standing  -     Place Sequential Compression Device; Standing  -     Verify / Perform Chlorhexidine Skin Prep; Standing  -     Type & Screen  -     Follow Anesthesia Guidelines / Standing Orders  -     Insert Peripheral IV  -     Saline Lock & Maintain IV Access  -     Pregnancy, Urine -  -     Obtain Informed Consent  -     Place Sequential Compression Device  -     PREVIOUS HISTORY; Standing  -     PREVIOUS HISTORY  -     Inpatient Admission; Standing  -     Inpatient Admission      Patient with enlarged fibroid uterus desiring definitive management.  Will undergo total abdominal hysterectomy with bilateral salpingectomy and cystoscopy today.  All questions answered.  We will plan to have patient return to see me in approximately 1 week, sooner as needed.    This document has been electronically signed by Bo Mccallum DO on June 7, 2021 10:35 CDT

## 2021-06-08 LAB
DEPRECATED RDW RBC AUTO: 49.3 FL (ref 37–54)
ERYTHROCYTE [DISTWIDTH] IN BLOOD BY AUTOMATED COUNT: 14.6 % (ref 12.3–15.4)
HCT VFR BLD AUTO: 32.8 % (ref 34–46.6)
HGB BLD-MCNC: 10.9 G/DL (ref 12–15.9)
MCH RBC QN AUTO: 30.4 PG (ref 26.6–33)
MCHC RBC AUTO-ENTMCNC: 33.2 G/DL (ref 31.5–35.7)
MCV RBC AUTO: 91.4 FL (ref 79–97)
PLATELET # BLD AUTO: 172 10*3/MM3 (ref 140–450)
PMV BLD AUTO: 10.6 FL (ref 6–12)
RBC # BLD AUTO: 3.59 10*6/MM3 (ref 3.77–5.28)
WBC # BLD AUTO: 8.66 10*3/MM3 (ref 3.4–10.8)

## 2021-06-08 PROCEDURE — 85027 COMPLETE CBC AUTOMATED: CPT | Performed by: OBSTETRICS & GYNECOLOGY

## 2021-06-08 PROCEDURE — 99024 POSTOP FOLLOW-UP VISIT: CPT | Performed by: OBSTETRICS & GYNECOLOGY

## 2021-06-08 PROCEDURE — 63710000001 ONDANSETRON PER 8 MG: Performed by: OBSTETRICS & GYNECOLOGY

## 2021-06-08 RX ORDER — POLYETHYLENE GLYCOL 3350 17 G/17G
17 POWDER, FOR SOLUTION ORAL 2 TIMES DAILY
Qty: 72 EACH | Refills: 3 | Status: SHIPPED | OUTPATIENT
Start: 2021-06-08 | End: 2021-09-21

## 2021-06-08 RX ORDER — OXYCODONE HYDROCHLORIDE 5 MG/1
5 TABLET ORAL EVERY 4 HOURS PRN
Qty: 20 TABLET | Refills: 0 | Status: SHIPPED | OUTPATIENT
Start: 2021-06-08 | End: 2021-06-14

## 2021-06-08 RX ORDER — IBUPROFEN 800 MG/1
800 TABLET ORAL EVERY 6 HOURS PRN
Qty: 60 TABLET | Refills: 2 | Status: SHIPPED | OUTPATIENT
Start: 2021-06-08 | End: 2021-09-21

## 2021-06-08 RX ORDER — ACETAMINOPHEN 500 MG
1000 TABLET ORAL EVERY 8 HOURS
Qty: 60 TABLET | Refills: 2 | Status: SHIPPED | OUTPATIENT
Start: 2021-06-08

## 2021-06-08 RX ORDER — IBUPROFEN 800 MG/1
800 TABLET ORAL EVERY 8 HOURS SCHEDULED
Status: DISCONTINUED | OUTPATIENT
Start: 2021-06-08 | End: 2021-06-08

## 2021-06-08 RX ORDER — ONDANSETRON 4 MG/1
4 TABLET, FILM COATED ORAL DAILY PRN
Qty: 30 TABLET | Refills: 1 | Status: SHIPPED | OUTPATIENT
Start: 2021-06-08 | End: 2021-09-21

## 2021-06-08 RX ADMIN — ASPIRIN 81 MG: 81 TABLET, FILM COATED ORAL at 09:18

## 2021-06-08 RX ADMIN — OXYCODONE 10 MG: 5 TABLET ORAL at 17:27

## 2021-06-08 RX ADMIN — ONDANSETRON HYDROCHLORIDE 4 MG: 4 TABLET, FILM COATED ORAL at 12:27

## 2021-06-08 RX ADMIN — METOPROLOL TARTRATE 50 MG: 50 TABLET, FILM COATED ORAL at 21:31

## 2021-06-08 RX ADMIN — POLYETHYLENE GLYCOL 3350 17 G: 17 POWDER, FOR SOLUTION ORAL at 17:27

## 2021-06-08 RX ADMIN — OXYCODONE 5 MG: 5 TABLET ORAL at 02:21

## 2021-06-08 RX ADMIN — ACETAMINOPHEN 1000 MG: 500 TABLET, FILM COATED ORAL at 07:25

## 2021-06-08 RX ADMIN — ONDANSETRON HYDROCHLORIDE 4 MG: 4 TABLET, FILM COATED ORAL at 17:27

## 2021-06-08 RX ADMIN — LOSARTAN POTASSIUM 100 MG: 50 TABLET, FILM COATED ORAL at 09:18

## 2021-06-08 RX ADMIN — METOPROLOL TARTRATE 50 MG: 50 TABLET, FILM COATED ORAL at 09:18

## 2021-06-08 RX ADMIN — AMLODIPINE BESYLATE 10 MG: 10 TABLET ORAL at 09:18

## 2021-06-08 RX ADMIN — OXYCODONE 10 MG: 5 TABLET ORAL at 09:18

## 2021-06-08 RX ADMIN — ACETAMINOPHEN 1000 MG: 500 TABLET, FILM COATED ORAL at 14:47

## 2021-06-08 NOTE — DISCHARGE SUMMARY
[facility]  Marta Yee Day  : 1980  MRN: [id number]  CSN: [id number]    Discharge Summary      Date of Admission: 2021   Date of Discharge: 2021   Discharge Diagnosis: 1.  Fibroid uterus, abnormal uterine bleeding, pelvic pain   Procedures Performed: Procedure(s):  TOTAL ABDOMINAL HYSTERECTOMY, bilateral salpingectomy, cystoscopy      Brief History: Patient is a 40 y.o.who presented for scheduled hysterectomy secondary to large fibroid uterus.  Patient had uncomplicated surgery and uncomplicated postoperative course.   Hospital Course: Her hospital course has been uneventful.  Today, on postoperative day # 2, she is tolerating a regular diet, ambulating without assistance, and desires to go home.  Rogers catheter was removed this morning, and she has urinated without difficulty. She has had no fever, and her pain is controlled.  She has had no nausea or vomiting.   Pending Studies: Tissue Pathology   Condition at Discharge: Stable   Discharge Diet: Diet Instructions     Diet: Regular      Discharge Diet: Regular         Discharge Activity: Activity Instructions     Bathing Restrictions      Type of Restriction: Bathing    Bathing Restrictions: Other    Explain Bathing Restrictions: No soaking in bathtub for 4 weeks, showers are fine.    Driving Restrictions      Type of Restriction: Driving    Driving Restrictions: No Driving (Time Limited)    Length: Other    Indicate Length of Restriction: No driving for 1 week or while on narcotic pain medications. Riding is car is fine.    Lifting Restrictions      Type of Restriction: Lifting    Lifting Restrictions: Other    Explain Lifing Restrictions: No lifting greater than 15 pound until cleared by surgeon.    Pelvic Rest      Nothing in the vagina for 6 weeks to include tampons or intercourse.    Sexual Activity Restrictions      Type of Restriction: Sex    Explain Sexual Activity Restrictions: No sexual intercourse for at least 6 weeks          Discharge Medications:    Your medication list      START taking these medications      Instructions Last Dose Given Next Dose Due   acetaminophen 500 MG tablet  Commonly known as: TYLENOL      Take 2 tablets by mouth Every 8 (Eight) Hours.       ibuprofen 800 MG tablet  Commonly known as: ADVIL,MOTRIN      Take 1 tablet by mouth Every 6 (Six) Hours As Needed for Mild Pain.  **Take with food**       ondansetron 4 MG tablet  Commonly known as: Zofran      Take 1 tablet by mouth Daily As Needed for Nausea or Vomiting.       oxyCODONE 5 MG immediate release tablet  Commonly known as: ROXICODONE      Take 1 tablet by mouth Every 4 (Four) Hours As Needed for Moderate Pain for up to 6 days.       polyethylene glycol 17 g packet  Commonly known as: MIRALAX      Dissolve 1 packet (17 g) in 4 to 8 ounces of beverage and drink by mouth 2 (Two) Times a Day.          CHANGE how you take these medications      Instructions Last Dose Given Next Dose Due   vitamin D 1.25 MG (24181 UT) capsule capsule  Commonly known as: ERGOCALCIFEROL  What changed: additional instructions      Take 1 capsule by mouth 1 (One) Time Per Week.          CONTINUE taking these medications      Instructions Last Dose Given Next Dose Due   amLODIPine 10 MG tablet  Commonly known as: NORVASC      Take 1 tablet by mouth Daily.       aspirin 81 MG EC tablet      Take 81 mg by mouth Every Other Day.       CHILDRENS MULTIVITAMIN/IRON PO      Take  by mouth Daily.       ferrous sulfate 325 (65 FE) MG tablet      Take 325 mg by mouth Every Night.       Flonase 50 MCG/ACT nasal spray  Generic drug: fluticasone      2 sprays into the nostril(s) as directed by provider Daily.       hydrocortisone 25 MG suppository  Commonly known as: ANUSOL-HC      Insert 1 suppository into the rectum 2 (Two) Times a Day As Needed for Hemorrhoids.       losartan 100 MG tablet  Commonly known as: COZAAR      Take 100 mg by mouth Daily. 1/2 tablet in the morning and 1/2 tablet at  night       metoprolol tartrate 50 MG tablet  Commonly known as: LOPRESSOR      Take 50 mg by mouth 2 (Two) Times a Day.       triamcinolone 0.1 % cream  Commonly known as: KENALOG      Apply  topically to the appropriate area as directed As Needed.             Where to Get Your Medications      These medications were sent to [facility] [place] [id number]    Hours: Monday through Friday 7:00am to 5:00pm Phone: [phone] ·   acetaminophen 500 MG tablet  · ibuprofen 800 MG tablet  · ondansetron 4 MG tablet  · oxyCODONE 5 MG immediate release tablet  · polyethylene glycol 17 g packet        Discharge Disposition: home   Follow-up: Future Appointments   Date Time Provider Department Center   6/15/2021  3:15 PM Bo Mccallum DO [facility] None   6/21/2021  8:30 AM Marco A Stanley MD [facility]   10/1/2021 10:15 AM Katerina Solis MD [facility] CD [facility] None            This note has been electronically signed.    Bo Mccallum DO  June 9, 2021  06:56 CDT

## 2021-06-08 NOTE — PLAN OF CARE
Goal Outcome Evaluation:  Plan of Care Reviewed With: patient   Progress: improving  Outcome Summary: Pt nauseated with eating. Zofran prn helps. Ambulated in the halls. Showered. Dermabond intact. Incision clean, dry, intact. Still has not passed gas.

## 2021-06-08 NOTE — MEDICAL STUDENT
LOS: 1 day   Patient Care Team:  Provider, No Known as PCP - General      Subjective    Ms. Marta Chambers is a 41 yo  female who presents POD1 status post total abdominal hysterectomy with bilateral salpingectomy and cystoscopy. Patient presented with enlarged fibroid uterus and desired definitive management. She states she is doing well overall this morning compared to yesterday. Patient states her pain is 4.5/10 today which has improved from 15/10 she felt yesterday.     Her incision is dry with no drainage and healing well. Her catheter was removed 20 minutes ago but she has not yet been able to void on her own. She has had no BM or flatus since yesterday. Patient only complains of occasional gas pain. Her appetite has improved this morning. She is ambulating well. She denies any chest pain, calf tenderness, or SOB.      Objective     Vital Signs  Temp:  [97 °F (36.1 °C)-98.4 °F (36.9 °C)] 98 °F (36.7 °C)  Heart Rate:  [57-77] 66  Resp:  [10-20] 18  BP: (116-174)/(60-85) 116/67    Labs:  Lab Results (last 24 hours)     Procedure Component Value Units Date/Time    Pregnancy, Urine - [[id number]]  (Normal) Collected: 21 0830    Specimen: Urine Updated: 21 0844     HCG, Urine QL Negative    Tissue Pathology Exam [[id number]] Collected: 21 1245    Specimen: Tissue from Uterus, Cervix, Bilateral Fallopian Tubes  Updated: 21 1341    CBC (No Diff) [[id number]]  (Abnormal) Collected: 21 0351    Specimen: Blood Updated: 21 0416     WBC 8.66 10*3/mm3      RBC 3.59 10*6/mm3      Hemoglobin 10.9 g/dL      Hematocrit 32.8 %      MCV 91.4 fL      MCH 30.4 pg      MCHC 33.2 g/dL      RDW 14.6 %      RDW-SD 49.3 fl      MPV 10.6 fL      Platelets 172 10*3/mm3               Medication:  Current Facility-Administered Medications   Medication Dose Route Frequency Provider Last Rate Last Admin   • acetaminophen (TYLENOL) tablet 1,000 mg  1,000 mg Oral Q8H Bo Mccallum DO   1,000 mg  at 06/07/21 2329   • amLODIPine (NORVASC) tablet 10 mg  10 mg Oral Daily Bo Mccallum DO       • aspirin EC tablet 81 mg  81 mg Oral Every Other Day Bo Mccallum DO       • bisacodyl (DULCOLAX) suppository 10 mg  10 mg Rectal Daily PRN Bo Mccallum, DO       • HYDROmorphone (DILAUDID) injection 0.5 mg  0.5 mg Intravenous Q2H PRN Bo Mccallum DO        And   • naloxone (NARCAN) injection 0.1 mg  0.1 mg Intravenous Q5 Min PRN Bo Mccallum DO       • ketorolac (TORADOL) injection 30 mg  30 mg Intravenous Q6H Bo Mccallum, DO       • lactated ringers infusion  125 mL/hr Intravenous Continuous Bo Mccallum  mL/hr at 06/07/21 2231 125 mL/hr at 06/07/21 2231   • losartan (COZAAR) tablet 100 mg  100 mg Oral Daily Bo Mccallum DO   100 mg at 06/07/21 1555   • magnesium hydroxide (MILK OF MAGNESIA) suspension 2400 mg/10mL 10 mL  10 mL Oral Daily PRN Bo Mccallum DO       • metoprolol tartrate (LOPRESSOR) tablet 50 mg  50 mg Oral Q12H Bo Mccallum DO   50 mg at 06/07/21 2055   • ondansetron (ZOFRAN) tablet 4 mg  4 mg Oral Q6H PRN Bo Mccallum DO        Or   • ondansetron (ZOFRAN) injection 4 mg  4 mg Intravenous Q6H PRN Bo Mccallum DO   4 mg at 06/07/21 2006   • oxyCODONE (ROXICODONE) immediate release tablet 10 mg  10 mg Oral Q4H PRN Bo Mccallum DO   10 mg at 06/07/21 2017   • oxyCODONE (ROXICODONE) immediate release tablet 5 mg  5 mg Oral Q4H PRN Bo Mccallum DO   5 mg at 06/08/21 0221   • polyethylene glycol (MIRALAX) packet 17 g  17 g Oral Daily PRN Bo Mccallum DO       • promethazine (PHENERGAN) suppository 12.5 mg  12.5 mg Rectal Q6H PRN XenaBo ramirez DO        Or   • promethazine (PHENERGAN) tablet 12.5 mg  12.5 mg Oral Q6H PRN Bo Mccallum DO       • Scopolamine (TRANSDERM-SCOP) 1.5 MG/3DAYS patch 1 patch  1 patch Transdermal  Q72H Verona Akhtar, CRNA   1 patch at 06/07/21 1323         Assessment/Plan       Dysmenorrhea    Pelvic pain    Intramural leiomyoma of uterus    Abnormal uterine bleeding (AUB)    Menorrhagia with regular cycle    Status post abdominal hysterectomy    Continue encouraging ambulation and proper diet.     Continue to monitor patient for ability to void and initiate BM on her own.           Vale Liu, Medical Student  06/08/21  05:59 CDT     I have seen and evaluated the patient.  I have discussed the case with the medical student. I have reviewed the notes, assessment and plan, and/or procedures performed by the medical student. I concur with the medical student’s documentation.  Patient is overall stable and doing well this morning.  Appropriate change in H&H.  No evidence of leukocytosis at this time.  Pain seems well controlled.  Continue current inpatient management.  Will reevaluate this afternoon.  Close eye to bowel function.        This document has been electronically signed by Bo Mccallum DO on June 8, 2021 06:50 CDT

## 2021-06-08 NOTE — PLAN OF CARE
Goal Outcome Evaluation:  Plan of Care Reviewed With: patient    Progress: improving  Outcome Summary: VSS,pain controlled with po meds, LTV abd dressing dry, d/c nesbitt and ambulated to the bathroom

## 2021-06-09 VITALS
HEIGHT: 72 IN | HEART RATE: 58 BPM | TEMPERATURE: 98.1 F | SYSTOLIC BLOOD PRESSURE: 141 MMHG | OXYGEN SATURATION: 100 % | BODY MASS INDEX: 37.06 KG/M2 | WEIGHT: 273.59 LBS | DIASTOLIC BLOOD PRESSURE: 78 MMHG | RESPIRATION RATE: 18 BRPM

## 2021-06-09 RX ORDER — METOCLOPRAMIDE HYDROCHLORIDE 5 MG/ML
10 INJECTION INTRAMUSCULAR; INTRAVENOUS EVERY 6 HOURS
Status: DISCONTINUED | OUTPATIENT
Start: 2021-06-09 | End: 2021-06-09 | Stop reason: HOSPADM

## 2021-06-09 RX ADMIN — ACETAMINOPHEN 1000 MG: 500 TABLET, FILM COATED ORAL at 14:45

## 2021-06-09 RX ADMIN — ACETAMINOPHEN 1000 MG: 500 TABLET, FILM COATED ORAL at 00:21

## 2021-06-09 RX ADMIN — MAGNESIUM HYDROXIDE 10 ML: 2400 SUSPENSION ORAL at 05:53

## 2021-06-09 RX ADMIN — ACETAMINOPHEN 1000 MG: 500 TABLET, FILM COATED ORAL at 07:33

## 2021-06-09 RX ADMIN — AMLODIPINE BESYLATE 10 MG: 10 TABLET ORAL at 09:05

## 2021-06-09 RX ADMIN — LOSARTAN POTASSIUM 50 MG: 50 TABLET, FILM COATED ORAL at 14:37

## 2021-06-09 RX ADMIN — METOPROLOL TARTRATE 50 MG: 50 TABLET, FILM COATED ORAL at 09:06

## 2021-06-09 NOTE — PROGRESS NOTES
[facility]  Marta Chambers  : 1980   MRN: [id number]  CSN: [id number]    Post-operative Day # 2  Subjective   Ms. Marta Chambers is a 41 yo  female who presents POD 2 status post total abdominal hysterectomy with bilateral salpingectomy and cystoscopy. Patient presented with enlarged fibroid uterus and desired definitive management. She states she is doing well this morning. Patient states her pain is 4.5/10 today which is about the same from yesterday and described the pain just as pressure at the incision site.     Her incision is dry with no drainage and healing well. Her catheter was removed yesterday and pt is voiding well. She has had no BM or flatus and requested prune juice and possibly more stool softener as it is making her uncomfortable. Her appetite has improved but is still experiencing nausea. She is ambulating well. She denies any chest pain, calf tenderness, or SOB.    Objective      Min/max vitals past 24 hours:   Temp  Min: 98.2 °F (36.8 °C)  Max: 98.9 °F (37.2 °C)  BP  Min: 108/53  Max: 148/68  Pulse  Min: 55  Max: 64  Pulse  Min: 55  Max: 64        General: well developed; well nourished  no acute distress   Abdomen: Mildly tender no guarding or rebound tenderness   Pelvic: Not performed   Ext: Calves NT     Lab Results   Component Value Date    WBC 8.66 2021    HGB 10.9 (L) 2021    HCT 32.8 (L) 2021    MCV 91.4 2021     2021    ABORH O Rh Positive 10/23/2014    RH Positive 2021        Assessment  Ms. Marta Chambers is a 41 yo  female who presents POD 2 status post total abdominal hysterectomy with bilateral salpingectomy and cystoscopy.   Dysmenorrhea    Pelvic pain    Intramural leiomyoma of uterus    Abnormal uterine bleeding (AUB)    Menorrhagia with regular cycle    Status post abdominal hysterectomy      Continue encouraging ambulation and proper diet.      Continue to monitor patient for ability to initiate BM on her own.        This document has been electronically signed by Chely Jean-Baptiste Medical Student on June 9, 2021 05:54 CDT    I have seen and evaluated the patient.  I have discussed the case with the medical student. I have reviewed the notes, assessment and plan, and/or procedures performed by the medical student. I concur with the medical student’s documentation.  Patient is overall stable at this time status post total abdominal hysterectomy however still has not passed flatus.  Concern for possible developing ileus at this time.  Will recommend inpatient management until patient is able to pass flatus and feels well.  Encourage ambulation, continue regular diet as tolerated, patient does continue to have some nausea and is requiring Zofran.  We will add Reglan to see if this will help increase bowel motility patient also on bowel regimen at this time.        This document has been electronically signed by Bo Mccallum DO on June 9, 2021 07:19 CDT

## 2021-06-09 NOTE — PLAN OF CARE
Goal Outcome Evaluation:  Plan of Care Reviewed With: patient        Progress: improving  Outcome Summary: Milk of mag given to help with BM. patient ambulating well in room, pain controlled with PO meds. VSS. incision clean, dry and intact

## 2021-06-09 NOTE — PLAN OF CARE
Goal Outcome Evaluation:           Progress: improving  Outcome Summary: VSS, tolerating po, nausea improved, ambulating, voiding, LTV incision CDI, desires discharge today pending passing flatus.

## 2021-06-10 ENCOUNTER — READMISSION MANAGEMENT (OUTPATIENT)
Dept: CALL CENTER | Facility: HOSPITAL | Age: 41
End: 2021-06-10

## 2021-06-10 ENCOUNTER — TRANSITIONAL CARE MANAGEMENT TELEPHONE ENCOUNTER (OUTPATIENT)
Dept: CALL CENTER | Facility: HOSPITAL | Age: 41
End: 2021-06-10

## 2021-06-10 LAB
LAB AP CASE REPORT: NORMAL
PATH REPORT.FINAL DX SPEC: NORMAL

## 2021-06-10 NOTE — OUTREACH NOTE
Prep Survey      Responses   [facility] patient discharged from?  [place]   Is LACE score < 7 ?  No   Emergency Room discharge w/ pulse ox?  No   Eligibility  [facility]   Date of Admission  06/07/21   Date of Discharge  06/09/21   Discharge Disposition  Home or Self Care   Discharge diagnosis  hysterectomy   Does the patient have one of the following disease processes/diagnoses(primary or secondary)?  General Surgery   Does the patient have Home health ordered?  No   Is there a DME ordered?  No   Comments regarding appointments  call for apmt   Prep survey completed?  Yes          Loreta Sears RN

## 2021-06-10 NOTE — OUTREACH NOTE
Call Center TCM Note      Responses   [facility] patient discharged from?  [place]   Does the patient have one of the following disease processes/diagnoses(primary or secondary)?  General Surgery   TCM attempt successful?  Yes   Call start time  1147   Call end time  1150   Discharge diagnosis  hysterectomy   Meds reviewed with patient/caregiver?  Yes   Is the patient having any side effects they believe may be caused by any medication additions or changes?  No   Does the patient have all medications related to this admission filled (includes all antibiotics, pain medications, etc.)  Yes   Is the patient taking all medications as directed (includes completed medication regime)?  Yes   Does the patient have a follow up appointment scheduled with their surgeon?  Yes   Has the patient kept scheduled appointments due by today?  N/A   Comments  Has a followup with surgeon on 6/15/2021 and a new patient appt with Dr Stanley on 6/21/2021   Has home health visited the patient within 72 hours of discharge?  N/A   Psychosocial issues?  No   Did the patient receive a copy of their discharge instructions?  Yes   Nursing interventions  Reviewed instructions with patient   What is the patient's perception of their health status since discharge?  Improving   Nursing interventions  Nurse provided patient education   Is the patient /caregiver able to teach back basic post-op care?  Keep incision areas clean,dry and protected, No tub bath, swimming, or hot tub until instructed by MD, Take showers only when approved by MD-sponge bathe until then   Is the patient/caregiver able to teach back signs and symptoms of incisional infection?  Increased redness, swelling or pain at the incisonal site, Increased drainage or bleeding, Incisional warmth, Pus or odor from incision, Fever   Is the patient/caregiver able to teach back steps to recovery at home?  Set small, achievable goals for return to baseline health, Rest and rebuild  strength, gradually increase activity, Make a list of questions for surgeon's appointment   If the patient is a current smoker, are they able to teach back resources for cessation?  Smoking cessation medications   Is the patient/caregiver able to teach back the hierarchy of who to call/visit for symptoms/problems? PCP, Specialist, Home health nurse, Urgent Care, ED, 911  Yes   TCM call completed?  Yes          Jacob Smith RN    6/10/2021, 11:50 EDT

## 2021-06-10 NOTE — PAYOR COMM NOTE
"Nilsa [facility]  [facility]  P: [phone]  F: [phone]    auth#[id number]    Marta Malik (40 y.o. Female)     Date of Birth Social Security Number Address Home Phone MRN    1980  [address] [place] [address] [phone] [id number]    [employer] Marital Status          Jewish Single       Admission Date Admission Type Admitting Provider Attending Provider Department, Room/Bed    21 Elective Bo Mccallum DO  [facility] MOTHER BABY, M765/    Discharge Date Discharge Disposition Discharge Destination        2021 Home or Self Care              Attending Provider: (none)   Allergies: No Known Allergies    Isolation: None   Infection: None   Code Status: Prior    Ht: 182.9 cm (72\")   Wt: 124 kg (273 lb 9.5 oz)    Admission Cmt: None   Principal Problem: None                Active Insurance as of 2021     Primary Coverage     Payor Plan Insurance Group Employer/Plan Group    ANTH MEDICAID [facility] MEDICAID [id number]     Payor Plan Address Payor Plan Phone Number Payor Plan Fax Number Effective Dates    [address] [address] [phone]  2014 - None Entered    [facility] [id number]       Subscriber Name Subscriber Birth Date Member ID       MARTA MALIK 1980 [id number]                 Emergency Contacts      (Rel.) Home Phone Work Phone Mobile Phone    Macy Woods (Sister) [phone] -- [phone]    JOANN PONCE (Significant Other) [phone] -- [phone]               Discharge Summary      Bo Mccallum DO at 21 0656          [facility]  Marta Malik  : 1980  MRN: [id number]  CSN: [id number]    Discharge Summary      Date of Admission: 2021   Date of Discharge: 2021   Discharge Diagnosis: 1.  Fibroid uterus, abnormal uterine bleeding, pelvic pain   Procedures Performed: Procedure(s):  TOTAL ABDOMINAL HYSTERECTOMY, bilateral salpingectomy, " cystoscopy      Brief History: Patient is a 40 y.o.who presented for scheduled hysterectomy secondary to large fibroid uterus.  Patient had uncomplicated surgery and uncomplicated postoperative course.   Hospital Course: Her hospital course has been uneventful.  Today, on postoperative day # 2, she is tolerating a regular diet, ambulating without assistance, and desires to go home.  Rogers catheter was removed this morning, and she has urinated without difficulty. She has had no fever, and her pain is controlled.  She has had no nausea or vomiting.   Pending Studies: Tissue Pathology   Condition at Discharge: Stable   Discharge Diet: Diet Instructions     Diet: Regular      Discharge Diet: Regular         Discharge Activity: Activity Instructions     Bathing Restrictions      Type of Restriction: Bathing    Bathing Restrictions: Other    Explain Bathing Restrictions: No soaking in bathtub for 4 weeks, showers are fine.    Driving Restrictions      Type of Restriction: Driving    Driving Restrictions: No Driving (Time Limited)    Length: Other    Indicate Length of Restriction: No driving for 1 week or while on narcotic pain medications. Riding is car is fine.    Lifting Restrictions      Type of Restriction: Lifting    Lifting Restrictions: Other    Explain Lifing Restrictions: No lifting greater than 15 pound until cleared by surgeon.    Pelvic Rest      Nothing in the vagina for 6 weeks to include tampons or intercourse.    Sexual Activity Restrictions      Type of Restriction: Sex    Explain Sexual Activity Restrictions: No sexual intercourse for at least 6 weeks         Discharge Medications:    Your medication list      START taking these medications      Instructions Last Dose Given Next Dose Due   acetaminophen 500 MG tablet  Commonly known as: TYLENOL      Take 2 tablets by mouth Every 8 (Eight) Hours.       ibuprofen 800 MG tablet  Commonly known as: ADVIL,MOTRIN      Take 1 tablet by mouth Every 6 (Six)  Hours As Needed for Mild Pain.  **Take with food**       ondansetron 4 MG tablet  Commonly known as: Zofran      Take 1 tablet by mouth Daily As Needed for Nausea or Vomiting.       oxyCODONE 5 MG immediate release tablet  Commonly known as: ROXICODONE      Take 1 tablet by mouth Every 4 (Four) Hours As Needed for Moderate Pain for up to 6 days.       polyethylene glycol 17 g packet  Commonly known as: MIRALAX      Dissolve 1 packet (17 g) in 4 to 8 ounces of beverage and drink by mouth 2 (Two) Times a Day.          CHANGE how you take these medications      Instructions Last Dose Given Next Dose Due   vitamin D 1.25 MG (94523 UT) capsule capsule  Commonly known as: ERGOCALCIFEROL  What changed: additional instructions      Take 1 capsule by mouth 1 (One) Time Per Week.          CONTINUE taking these medications      Instructions Last Dose Given Next Dose Due   amLODIPine 10 MG tablet  Commonly known as: NORVASC      Take 1 tablet by mouth Daily.       aspirin 81 MG EC tablet      Take 81 mg by mouth Every Other Day.       CHILDRENS MULTIVITAMIN/IRON PO      Take  by mouth Daily.       ferrous sulfate 325 (65 FE) MG tablet      Take 325 mg by mouth Every Night.       Flonase 50 MCG/ACT nasal spray  Generic drug: fluticasone      2 sprays into the nostril(s) as directed by provider Daily.       hydrocortisone 25 MG suppository  Commonly known as: ANUSOL-HC      Insert 1 suppository into the rectum 2 (Two) Times a Day As Needed for Hemorrhoids.       losartan 100 MG tablet  Commonly known as: COZAAR      Take 100 mg by mouth Daily. 1/2 tablet in the morning and 1/2 tablet at night       metoprolol tartrate 50 MG tablet  Commonly known as: LOPRESSOR      Take 50 mg by mouth 2 (Two) Times a Day.       triamcinolone 0.1 % cream  Commonly known as: KENALOG      Apply  topically to the appropriate area as directed As Needed.             Where to Get Your Medications      These medications were sent to [facility]  Pharmacy - [address] [address]    Hours: Monday through Friday 7:00am to 5:00pm Phone: [phone] ·   acetaminophen 500 MG tablet  · ibuprofen 800 MG tablet  · ondansetron 4 MG tablet  · oxyCODONE 5 MG immediate release tablet  · polyethylene glycol 17 g packet        Discharge Disposition: home   Follow-up: Future Appointments   Date Time Provider Department Center   6/15/2021  3:15 PM Jessica Gutierrez DO [facility]BG [facility] None   6/21/2021  8:30 AM Marco A Stanley MD [facility] FM HOP [facility]   10/1/2021 10:15 AM Katerina Solis MD [facility] CD [facility] None            This note has been electronically signed.    Jessica Gutierrez DO  June 9, 2021  06:56 CDT            Electronically signed by Jessica Gutierrez DO at 06/09/21 1710       Discharge Order (From admission, onward)     Start     Ordered    06/09/21 1712  Discharge patient  Once     Expected Discharge Date: 06/09/21    Discharge Disposition: Home or Self Care    Physician of Record for Attribution - Please select from Treatment Team: JESSICA GUTIERREZ [[id number]]    Review needed by CMO to determine Physician of Record: No       Question Answer Comment   Physician of Record for Attribution - Please select from Treatment Team JESSICA GUTIERREZ    Review needed by CMO to determine Physician of Record No        06/09/21 1711    06/08/21 0652  Discharge patient  Once,   Status:  Canceled     Expected Discharge Date: 06/08/21    Discharge Disposition: Home or Self Care    Physician of Record for Attribution - Please select from Treatment Team: JESSICA GUTIERREZ [[id number]]    Review needed by CMO to determine Physician of Record: No       Question Answer Comment   Physician of Record for Attribution - Please select from Treatment Team JESSICA GUTIERREZ    Review needed by CMO to determine Physician of Record No        06/08/21 0655

## 2021-06-15 ENCOUNTER — OFFICE VISIT (OUTPATIENT)
Dept: OBSTETRICS AND GYNECOLOGY | Facility: CLINIC | Age: 41
End: 2021-06-15

## 2021-06-15 VITALS
BODY MASS INDEX: 36.57 KG/M2 | HEIGHT: 72 IN | DIASTOLIC BLOOD PRESSURE: 84 MMHG | SYSTOLIC BLOOD PRESSURE: 140 MMHG | WEIGHT: 270 LBS

## 2021-06-15 DIAGNOSIS — Z09 POSTOPERATIVE FOLLOW-UP: ICD-10-CM

## 2021-06-15 DIAGNOSIS — Z90.710 STATUS POST ABDOMINAL HYSTERECTOMY: Primary | ICD-10-CM

## 2021-06-15 PROCEDURE — 99024 POSTOP FOLLOW-UP VISIT: CPT | Performed by: OBSTETRICS & GYNECOLOGY

## 2021-06-15 NOTE — PROGRESS NOTES
Chief complaint: Postoperative follow-up    Patient presents for postop follow-up status post total abdominal hysterectomy on 7 June 2021.  Patient is overall doing well and without major complaints at this time.  She is ambulating without difficulty, tolerating a regular diet.  She is urinating without difficulty.  Pain is well controlled with pain medications at this time.  No questions or concerns at this time.    Vital signs reviewed  No acute distress  Awake and oriented x3  Abdomen soft appropriately tender, well-healed Pfannenstiel incision    Patient is status post EDIN doing well and recovering appropriately at this time.  Patient is meeting appropriate milestones at this time.  Patient to return to see me in 4 weeks, sooner as needed.

## 2021-06-19 ENCOUNTER — READMISSION MANAGEMENT (OUTPATIENT)
Dept: CALL CENTER | Facility: HOSPITAL | Age: 41
End: 2021-06-19

## 2021-06-19 NOTE — OUTREACH NOTE
General Surgery Week 2 Survey      Responses   [facility] patient discharged from?  [place]   Does the patient have one of the following disease processes/diagnoses(primary or secondary)?  General Surgery   Week 2 attempt successful?  Yes   Call start time  0934   Call end time  0937   Discharge diagnosis  hysterectomy   Meds reviewed with patient/caregiver?  Yes   Is the patient taking all medications as directed (includes completed medication regime)?  Yes   Has the patient kept scheduled appointments due by today?  Yes   Psychosocial issues?  No   What is the patient's perception of their health status since discharge?  Improving   Nursing interventions  Nurse provided patient education   Is the patient /caregiver able to teach back basic post-op care?  Lifting as instructed by MD in discharge instructions [Dressing removed by surgeon and she has dissolvable stitches. ]   Is the patient/caregiver able to teach back signs and symptoms of incisional infection?  Increased redness, swelling or pain at the incisonal site, Increased drainage or bleeding, Fever   Is the patient/caregiver able to teach back the hierarchy of who to call/visit for symptoms/problems? PCP, Specialist, Home health nurse, Urgent Care, ED, 911  Yes   Additional teach back comments  Pt doing very well. Enc her to watch for s/s of infection and watch her lifting. She is taking Tylenol for pain.    Week 2 call completed?  Yes   Revoked  No further contact(revokes)-requires comment   Is the patient interested in additional calls from an ambulatory ?  NOTE:  applies to high risk patients requiring additional follow-up.  No   Graduated/Revoked comments  Pt doing well and feels like she is healing well.          Justine Parker RN

## 2021-06-21 ENCOUNTER — OFFICE VISIT (OUTPATIENT)
Dept: FAMILY MEDICINE CLINIC | Facility: CLINIC | Age: 41
End: 2021-06-21

## 2021-06-21 ENCOUNTER — LAB (OUTPATIENT)
Dept: LAB | Facility: HOSPITAL | Age: 41
End: 2021-06-21

## 2021-06-21 VITALS
RESPIRATION RATE: 20 BRPM | BODY MASS INDEX: 37.17 KG/M2 | WEIGHT: 274.4 LBS | OXYGEN SATURATION: 98 % | HEIGHT: 72 IN | HEART RATE: 99 BPM | SYSTOLIC BLOOD PRESSURE: 120 MMHG | DIASTOLIC BLOOD PRESSURE: 76 MMHG

## 2021-06-21 DIAGNOSIS — D50.9 IRON DEFICIENCY ANEMIA, UNSPECIFIED IRON DEFICIENCY ANEMIA TYPE: ICD-10-CM

## 2021-06-21 DIAGNOSIS — I71.019 AORTIC DISSECTION, THORACIC (HCC): ICD-10-CM

## 2021-06-21 DIAGNOSIS — N18.32 STAGE 3B CHRONIC KIDNEY DISEASE (HCC): ICD-10-CM

## 2021-06-21 DIAGNOSIS — E55.9 VITAMIN D DEFICIENCY: Primary | ICD-10-CM

## 2021-06-21 DIAGNOSIS — Z13.220 LIPID SCREENING: ICD-10-CM

## 2021-06-21 DIAGNOSIS — Z11.59 NEED FOR HEPATITIS C SCREENING TEST: ICD-10-CM

## 2021-06-21 LAB
25(OH)D3 SERPL-MCNC: 46.7 NG/ML
ALBUMIN SERPL-MCNC: 4.1 G/DL (ref 3.5–5.2)
ALBUMIN/GLOB SERPL: 1.2 G/DL
ALP SERPL-CCNC: 46 U/L (ref 39–117)
ALT SERPL W P-5'-P-CCNC: 14 U/L (ref 1–33)
ANION GAP SERPL CALCULATED.3IONS-SCNC: 7.6 MMOL/L (ref 5–15)
AST SERPL-CCNC: 13 U/L (ref 1–32)
BASOPHILS # BLD AUTO: 0.02 10*3/MM3 (ref 0–0.2)
BASOPHILS NFR BLD AUTO: 0.3 % (ref 0–1.5)
BILIRUB SERPL-MCNC: <0.2 MG/DL (ref 0–1.2)
BUN SERPL-MCNC: 19 MG/DL (ref 6–20)
BUN/CREAT SERPL: 10.9 (ref 7–25)
CALCIUM SPEC-SCNC: 9.4 MG/DL (ref 8.6–10.5)
CHLORIDE SERPL-SCNC: 106 MMOL/L (ref 98–107)
CHOLEST SERPL-MCNC: 183 MG/DL (ref 0–200)
CO2 SERPL-SCNC: 23.4 MMOL/L (ref 22–29)
CREAT SERPL-MCNC: 1.74 MG/DL (ref 0.57–1)
DEPRECATED RDW RBC AUTO: 46.5 FL (ref 37–54)
EOSINOPHIL # BLD AUTO: 0.16 10*3/MM3 (ref 0–0.4)
EOSINOPHIL NFR BLD AUTO: 2.7 % (ref 0.3–6.2)
ERYTHROCYTE [DISTWIDTH] IN BLOOD BY AUTOMATED COUNT: 13.9 % (ref 12.3–15.4)
FERRITIN SERPL-MCNC: 41.9 NG/ML (ref 13–150)
GFR SERPL CREATININE-BSD FRML MDRD: 39 ML/MIN/1.73
GLOBULIN UR ELPH-MCNC: 3.5 GM/DL
GLUCOSE SERPL-MCNC: 65 MG/DL (ref 65–99)
HCT VFR BLD AUTO: 35.3 % (ref 34–46.6)
HDLC SERPL-MCNC: 53 MG/DL (ref 40–60)
HGB BLD-MCNC: 11.5 G/DL (ref 12–15.9)
IMM GRANULOCYTES # BLD AUTO: 0.02 10*3/MM3 (ref 0–0.05)
IMM GRANULOCYTES NFR BLD AUTO: 0.3 % (ref 0–0.5)
IRON 24H UR-MRATE: 33 MCG/DL (ref 37–145)
IRON SATN MFR SERPL: 9 % (ref 20–50)
LDLC SERPL CALC-MCNC: 116 MG/DL (ref 0–100)
LDLC/HDLC SERPL: 2.16 {RATIO}
LYMPHOCYTES # BLD AUTO: 1.09 10*3/MM3 (ref 0.7–3.1)
LYMPHOCYTES NFR BLD AUTO: 18.6 % (ref 19.6–45.3)
MCH RBC QN AUTO: 30 PG (ref 26.6–33)
MCHC RBC AUTO-ENTMCNC: 32.6 G/DL (ref 31.5–35.7)
MCV RBC AUTO: 92.2 FL (ref 79–97)
MONOCYTES # BLD AUTO: 0.44 10*3/MM3 (ref 0.1–0.9)
MONOCYTES NFR BLD AUTO: 7.5 % (ref 5–12)
NEUTROPHILS NFR BLD AUTO: 4.12 10*3/MM3 (ref 1.7–7)
NEUTROPHILS NFR BLD AUTO: 70.6 % (ref 42.7–76)
NRBC BLD AUTO-RTO: 0 /100 WBC (ref 0–0.2)
PLATELET # BLD AUTO: 278 10*3/MM3 (ref 140–450)
PMV BLD AUTO: 11.1 FL (ref 6–12)
POTASSIUM SERPL-SCNC: 4.1 MMOL/L (ref 3.5–5.2)
PROT SERPL-MCNC: 7.6 G/DL (ref 6–8.5)
RBC # BLD AUTO: 3.83 10*6/MM3 (ref 3.77–5.28)
SODIUM SERPL-SCNC: 137 MMOL/L (ref 136–145)
TIBC SERPL-MCNC: 349 MCG/DL (ref 298–536)
TRANSFERRIN SERPL-MCNC: 234 MG/DL (ref 200–360)
TRIGL SERPL-MCNC: 77 MG/DL (ref 0–150)
VLDLC SERPL-MCNC: 14 MG/DL (ref 5–40)
WBC # BLD AUTO: 5.85 10*3/MM3 (ref 3.4–10.8)

## 2021-06-21 PROCEDURE — 82728 ASSAY OF FERRITIN: CPT | Performed by: STUDENT IN AN ORGANIZED HEALTH CARE EDUCATION/TRAINING PROGRAM

## 2021-06-21 PROCEDURE — 83540 ASSAY OF IRON: CPT | Performed by: STUDENT IN AN ORGANIZED HEALTH CARE EDUCATION/TRAINING PROGRAM

## 2021-06-21 PROCEDURE — 80061 LIPID PANEL: CPT | Performed by: STUDENT IN AN ORGANIZED HEALTH CARE EDUCATION/TRAINING PROGRAM

## 2021-06-21 PROCEDURE — 84466 ASSAY OF TRANSFERRIN: CPT | Performed by: STUDENT IN AN ORGANIZED HEALTH CARE EDUCATION/TRAINING PROGRAM

## 2021-06-21 PROCEDURE — 86803 HEPATITIS C AB TEST: CPT | Performed by: STUDENT IN AN ORGANIZED HEALTH CARE EDUCATION/TRAINING PROGRAM

## 2021-06-21 PROCEDURE — 99214 OFFICE O/P EST MOD 30 MIN: CPT | Performed by: STUDENT IN AN ORGANIZED HEALTH CARE EDUCATION/TRAINING PROGRAM

## 2021-06-21 PROCEDURE — 82306 VITAMIN D 25 HYDROXY: CPT | Performed by: STUDENT IN AN ORGANIZED HEALTH CARE EDUCATION/TRAINING PROGRAM

## 2021-06-21 PROCEDURE — 85025 COMPLETE CBC W/AUTO DIFF WBC: CPT | Performed by: STUDENT IN AN ORGANIZED HEALTH CARE EDUCATION/TRAINING PROGRAM

## 2021-06-21 PROCEDURE — 80053 COMPREHEN METABOLIC PANEL: CPT | Performed by: STUDENT IN AN ORGANIZED HEALTH CARE EDUCATION/TRAINING PROGRAM

## 2021-06-21 NOTE — PROGRESS NOTES
"Subjective:  Marta Chambers is a 40 y.o. female who presents for establish care.    CKD; Stage 3, states in Dec 2014 had a aortic dissection, was seeing nephrology, Cr is 1.7-2. Does see rheumatology for Sjogren's (monitroing). [facility], has follow-up next month.      Hysterectomy; had abdominal surgery ~ 2 weeks ago at [facility] in [place]. No issues post surgery, no fever, chills, nausea, vomiting, diarrhea , dysuria.     Hypertension/Aortic valve disorder; Currently on losartan 100mg daily, tolerating well, no adverse effects. BP has been well controlled at home. Recently seen cardiology, was told the valve is not severe enough to need repair. Currently without chest pain, soa, leg swelling, orthopnea.       Patient Active Problem List   Diagnosis   • Aortic dissection, thoracic (CMS/HCC)   • H/O aortic arch repair   • History of aortic root repair   • CKD (chronic kidney disease)   • Essential hypertension   • Sjogren's syndrome (CMS/HCC)   • Chronic renal insufficiency, stage III (moderate) (CMS/HCC)   • Uterine leiomyoma   • Dysmenorrhea   • Pelvic pain   • Nonrheumatic aortic valve disorder   • Intramural leiomyoma of uterus   • Abnormal uterine bleeding (AUB)   • Menorrhagia with regular cycle   • Status post abdominal hysterectomy     Vitals:    Vitals:    06/21/21 0831   BP: 120/76   Pulse: 99   Resp: 20   SpO2: 98%   Weight: 124 kg (274 lb 6.4 oz)   Height: 182.9 cm (72\")     Body mass index is 37.22 kg/m².      Current Outpatient Medications:   •  acetaminophen (TYLENOL) 500 MG tablet, Take 2 tablets by mouth Every 8 (Eight) Hours., Disp: 60 tablet, Rfl: 2  •  amLODIPine (NORVASC) 10 MG tablet, Take 1 tablet by mouth Daily., Disp: 90 tablet, Rfl: 3  •  aspirin (aspirin) 81 MG EC tablet, Take 81 mg by mouth Every Other Day., Disp: , Rfl:   •  ferrous sulfate 325 (65 FE) MG tablet, Take 325 mg by mouth Every Night., Disp: , Rfl:   •  fluticasone (Flonase) 50 MCG/ACT " nasal spray, 2 sprays into the nostril(s) as directed by provider Daily., Disp: , Rfl:   •  hydrocortisone (ANUSOL-HC) 25 MG suppository, Insert 1 suppository into the rectum 2 (Two) Times a Day As Needed for Hemorrhoids., Disp: 24 suppository, Rfl: 3  •  ibuprofen (ADVIL,MOTRIN) 800 MG tablet, Take 1 tablet by mouth Every 6 (Six) Hours As Needed for Mild Pain.  **Take with food**, Disp: 60 tablet, Rfl: 2  •  losartan (COZAAR) 100 MG tablet, Take 100 mg by mouth Daily. 1/2 tablet in the morning and 1/2 tablet at night, Disp: , Rfl:   •  metoprolol tartrate (LOPRESSOR) 50 MG tablet, Take 50 mg by mouth 2 (Two) Times a Day., Disp: , Rfl:   •  ondansetron (Zofran) 4 MG tablet, Take 1 tablet by mouth Daily As Needed for Nausea or Vomiting., Disp: 30 tablet, Rfl: 1  •  Pediatric Multivitamins-Iron (CHILDRENS MULTIVITAMIN/IRON PO), Take  by mouth Daily., Disp: , Rfl:   •  polyethylene glycol (MIRALAX) 17 g packet, Dissolve 1 packet (17 g) in 4 to 8 ounces of beverage and drink by mouth 2 (Two) Times a Day., Disp: 72 each, Rfl: 3  •  triamcinolone (KENALOG) 0.1 % cream, Apply  topically to the appropriate area as directed As Needed., Disp: , Rfl:   •  vitamin D (ERGOCALCIFEROL) 1.25 MG (36551 UT) capsule capsule, Take 1 capsule by mouth 1 (One) Time Per Week. (Patient taking differently: Take 50,000 Units by mouth 1 (One) Time Per Week. thursday), Disp: 4 capsule, Rfl: 3    Patient Active Problem List   Diagnosis   • Aortic dissection, thoracic (CMS/HCC)   • H/O aortic arch repair   • History of aortic root repair   • CKD (chronic kidney disease)   • Essential hypertension   • Sjogren's syndrome (CMS/HCC)   • Chronic renal insufficiency, stage III (moderate) (CMS/HCC)   • Uterine leiomyoma   • Dysmenorrhea   • Pelvic pain   • Nonrheumatic aortic valve disorder   • Intramural leiomyoma of uterus   • Abnormal uterine bleeding (AUB)   • Menorrhagia with regular cycle   • Status post abdominal hysterectomy     Past Surgical  History:   Procedure Laterality Date   • ASCENDING ARCH/HEMIARCH REPLACEMENT     • CARDIAC VALVE SURGERY     • HAND SURGERY Left     secondary to trauma   • TOTAL ABDOMINAL HYSTERECTOMY Bilateral 6/7/2021    Procedure: TOTAL ABDOMINAL HYSTERECTOMY, bilateral salpingectomy, cystoscopy;  Surgeon: Bo Mccallum DO;  Location: [facility];  Service: Obstetrics/Gynecology;  Laterality: Bilateral;   • TUBAL ABDOMINAL LIGATION       Social History     Socioeconomic History   • Marital status: Single     Spouse name: Not on file   • Number of children: Not on file   • Years of education: Not on file   • Highest education level: Not on file   Tobacco Use   • Smoking status: Former Smoker     Packs/day: 1.00     Years: 24.00     Pack years: 24.00     Types: Cigarettes   • Smokeless tobacco: Never Used   Vaping Use   • Vaping Use: Every day   • Substances: Flavoring   Substance and Sexual Activity   • Alcohol use: Yes     Comment: social   • Drug use: No   • Sexual activity: Defer     Family History   Problem Relation Age of Onset   • Sudden death Father         cardiac   • Other Father         ruptured aortic aneurysm   • Aortic dissection Father    • Diabetes Other    • Hypertension Other    • Diabetes Mother    • Hypertension Mother    • COPD Mother    • Asthma Brother    • Hypertension Sister      Admission on 06/07/2021, Discharged on 06/09/2021   Component Date Value Ref Range Status   • ABO Type 06/07/2021 O   Final   • RH type 06/07/2021 Positive   Final   • Antibody Screen 06/07/2021 Negative   Final   • T&S Expiration Date 06/07/2021 6/10/2021 11:59:59 PM   Final   • HCG, Urine QL 06/07/2021 Negative  Negative Final   • Previous History 06/07/2021 No record on File   Final   • Case Report 06/07/2021    Final                    Value:Surgical Pathology Report                         Case: [id number]                                  Authorizing Provider:  Bo Mccallum DO Collected:            06/07/2021 12:45 PM          Ordering Location:     [facility]             Received:            06/07/2021 01:41 PM                                 [place] [facility]                                                              Pathologist:           Yossi Desir MD                                                            Specimen:    Uterus, Cervix, Bilateral Fallopian Tubes , Uterus, cervix, bilateral tubes and                     fibroids                                                                                  • Final Diagnosis 06/07/2021    Final                    Value:This result contains rich text formatting which cannot be displayed here.   • WBC 06/08/2021 8.66  3.40 - 10.80 10*3/mm3 Final   • RBC 06/08/2021 3.59* 3.77 - 5.28 10*6/mm3 Final   • Hemoglobin 06/08/2021 10.9* 12.0 - 15.9 g/dL Final   • Hematocrit 06/08/2021 32.8* 34.0 - 46.6 % Final   • MCV 06/08/2021 91.4  79.0 - 97.0 fL Final   • MCH 06/08/2021 30.4  26.6 - 33.0 pg Final   • MCHC 06/08/2021 33.2  31.5 - 35.7 g/dL Final   • RDW 06/08/2021 14.6  12.3 - 15.4 % Final   • RDW-SD 06/08/2021 49.3  37.0 - 54.0 fl Final   • MPV 06/08/2021 10.6  6.0 - 12.0 fL Final   • Platelets 06/08/2021 172  140 - 450 10*3/mm3 Final   Pre-Admission Testing on 06/04/2021   Component Date Value Ref Range Status   • Glucose 06/04/2021 69  65 - 99 mg/dL Final   • BUN 06/04/2021 25* 6 - 20 mg/dL Final   • Creatinine 06/04/2021 2.08* 0.57 - 1.00 mg/dL Final   • Sodium 06/04/2021 140  136 - 145 mmol/L Final   • Potassium 06/04/2021 4.8  3.5 - 5.2 mmol/L Final   • Chloride 06/04/2021 111* 98 - 107 mmol/L Final   • CO2 06/04/2021 24.0  22.0 - 29.0 mmol/L Final   • Calcium 06/04/2021 9.4  8.6 - 10.5 mg/dL Final   • eGFR   Amer 06/04/2021 32* >60 mL/min/1.73 Final   • BUN/Creatinine Ratio 06/04/2021 12.0  7.0 - 25.0 Final   • Anion Gap 06/04/2021 5.0  5.0 - 15.0 mmol/L Final   • HCG Qualitative 06/04/2021 Negative  Negative Final   • WBC 06/04/2021 5.64   3.40 - 10.80 10*3/mm3 Final   • RBC 06/04/2021 4.23  3.77 - 5.28 10*6/mm3 Final   • Hemoglobin 06/04/2021 12.6  12.0 - 15.9 g/dL Final   • Hematocrit 06/04/2021 39.2  34.0 - 46.6 % Final   • MCV 06/04/2021 92.7  79.0 - 97.0 fL Final   • MCH 06/04/2021 29.8  26.6 - 33.0 pg Final   • MCHC 06/04/2021 32.1  31.5 - 35.7 g/dL Final   • RDW 06/04/2021 14.6  12.3 - 15.4 % Final   • RDW-SD 06/04/2021 49.7  37.0 - 54.0 fl Final   • MPV 06/04/2021 11.4  6.0 - 12.0 fL Final   • Platelets 06/04/2021 201  140 - 450 10*3/mm3 Final   • Neutrophil % 06/04/2021 65.4  42.7 - 76.0 % Final   • Lymphocyte % 06/04/2021 21.8  19.6 - 45.3 % Final   • Monocyte % 06/04/2021 9.9  5.0 - 12.0 % Final   • Eosinophil % 06/04/2021 2.1  0.3 - 6.2 % Final   • Basophil % 06/04/2021 0.4  0.0 - 1.5 % Final   • Immature Grans % 06/04/2021 0.4  0.0 - 0.5 % Final   • Neutrophils, Absolute 06/04/2021 3.69  1.70 - 7.00 10*3/mm3 Final   • Lymphocytes, Absolute 06/04/2021 1.23  0.70 - 3.10 10*3/mm3 Final   • Monocytes, Absolute 06/04/2021 0.56  0.10 - 0.90 10*3/mm3 Final   • Eosinophils, Absolute 06/04/2021 0.12  0.00 - 0.40 10*3/mm3 Final   • Basophils, Absolute 06/04/2021 0.02  0.00 - 0.20 10*3/mm3 Final   • Immature Grans, Absolute 06/04/2021 0.02  0.00 - 0.05 10*3/mm3 Final   • nRBC 06/04/2021 0.0  0.0 - 0.2 /100 WBC Final   Lab on 06/04/2021   Component Date Value Ref Range Status   • COVID19 06/04/2021 Not Detected  Not Detected - Ref. Range Final   Procedure visit on 05/06/2021   Component Date Value Ref Range Status   • Case Report 05/06/2021    Final                    Value:Surgical Pathology Report                         Case: [id number]                                  Authorizing Provider:  Bo Mccallum DO Collected:           05/06/2021 10:32 AM          Ordering Location:     [facility]     Received:            05/06/2021 03:23 PM                                 [facility]                                                                  Pathologist:           Frederick Kathleen MD                                                          Specimen:    Endometrium, EMB                                                                          • Clinical Information 05/06/2021    Final                    Value:This result contains rich text formatting which cannot be displayed here.   • Final Diagnosis 05/06/2021    Final                    Value:This result contains rich text formatting which cannot be displayed here.   Ancillary Procedure on 04/16/2021   Component Date Value Ref Range Status   • BSA 04/16/2021 2.4  m^2 Final   • RVIDd 04/16/2021 3.8  cm Final   • IVSd 04/16/2021 1.3  cm Final   • LVIDd 04/16/2021 5.2  cm Final   • LVIDs 04/16/2021 3.6  cm Final   • LVPWd 04/16/2021 1.2  cm Final   • IVS/LVPW 04/16/2021 1.1   Final   • FS 04/16/2021 30.8  % Final   • EDV(Teich) 04/16/2021 129.5  ml Final   • ESV(Teich) 04/16/2021 54.4  ml Final   • EF(Teich) 04/16/2021 58.0  % Final   • EDV(cubed) 04/16/2021 140.6  ml Final   • ESV(cubed) 04/16/2021 46.7  ml Final   • EF(cubed) 04/16/2021 66.8  % Final   • LV mass(C)d 04/16/2021 263.5  grams Final   • LV mass(C)dI 04/16/2021 108.5  grams/m^2 Final   • SV(Teich) 04/16/2021 75.1  ml Final   • SI(Teich) 04/16/2021 30.9  ml/m^2 Final   • SV(cubed) 04/16/2021 94.0  ml Final   • SI(cubed) 04/16/2021 38.7  ml/m^2 Final   • EPSS 04/16/2021 0.6  cm Final   • Ao root diam 04/16/2021 3.8  cm Final   • Ao root area 04/16/2021 11.3  cm^2 Final   • asc Aorta Diam 04/16/2021 3.7  cm Final   • LVOT diam 04/16/2021 2.4  cm Final   • LVOT area 04/16/2021 4.5  cm^2 Final   • LVOT area(traced) 04/16/2021 4.5  cm^2 Final   • LVLd ap4 04/16/2021 9.0  cm Final   • EDV(MOD-sp4) 04/16/2021 118.0  ml Final   • LVLs ap4 04/16/2021 8.0  cm Final   • ESV(MOD-sp4) 04/16/2021 46.0  ml Final   • EF(MOD-sp4) 04/16/2021 61.0  % Final   • LVLd ap2 04/16/2021 8.9  cm Final   • EDV(MOD-sp2) 04/16/2021 103.0  ml Final   • LVLs  ap2 04/16/2021 8.0  cm Final   • ESV(MOD-sp2) 04/16/2021 41.0  ml Final   • EF(MOD-sp2) 04/16/2021 60.2  % Final   • SV(MOD-sp4) 04/16/2021 72.0  ml Final   • SI(MOD-sp4) 04/16/2021 29.7  ml/m^2 Final   • SV(MOD-sp2) 04/16/2021 62.0  ml Final   • SI(MOD-sp2) 04/16/2021 25.5  ml/m^2 Final   • Ao root area (BSA corrected) 04/16/2021 1.6   Final   • LV [place] Vol (BSA corrected) 04/16/2021 48.6  ml/m^2 Final   • LV Sys Vol (BSA corrected) 04/16/2021 18.9  ml/m^2 Final   • MV E max nam 04/16/2021 89.8  cm/sec Final   • MV A max nam 04/16/2021 54.8  cm/sec Final   • MV E/A 04/16/2021 1.6   Final   • MV V2 max 04/16/2021 99.2  cm/sec Final   • MV max PG 04/16/2021 3.9  mmHg Final   • MV V2 mean 04/16/2021 49.1  cm/sec Final   • MV mean PG 04/16/2021 1.0  mmHg Final   • MV V2 VTI 04/16/2021 30.6  cm Final   • MVA(VTI) 04/16/2021 4.6  cm^2 Final   • MV P1/2t max nam 04/16/2021 94.1  cm/sec Final   • MV P1/2t 04/16/2021 86.9  msec Final   • MVA(P1/2t) 04/16/2021 2.5  cm^2 Final   • MV dec slope 04/16/2021 317.0  cm/sec^2 Final   • Ao pk nam 04/16/2021 142.0  cm/sec Final   • Ao max PG 04/16/2021 8.1  mmHg Final   • Ao max PG (full) 04/16/2021 1.5  mmHg Final   • Ao V2 mean 04/16/2021 106.0  cm/sec Final   • Ao mean PG 04/16/2021 5.0  mmHg Final   • Ao mean PG (full) 04/16/2021 2.0  mmHg Final   • Ao V2 VTI 04/16/2021 35.7  cm Final   • SHER(I,A) 04/16/2021 4.0  cm^2 Final   • SHER(I,D) 04/16/2021 4.0  cm^2 Final   • SHER(V,A) 04/16/2021 4.1  cm^2 Final   • SHER(V,D) 04/16/2021 4.1  cm^2 Final   • AI max nam 04/16/2021 444.0  cm/sec Final   • AI max PG 04/16/2021 78.9  mmHg Final   • AI dec slope 04/16/2021 251.0  cm/sec^2 Final   • AI P1/2t 04/16/2021 518.1  msec Final   • LV V1 max PG 04/16/2021 6.6  mmHg Final   • LV V1 mean PG 04/16/2021 3.0  mmHg Final   • LV V1 max 04/16/2021 128.0  cm/sec Final   • LV V1 mean 04/16/2021 88.1  cm/sec Final   • LV V1 VTI 04/16/2021 31.3  cm Final   • MR max nam 04/16/2021 565.0  cm/sec Final    • MR max PG 04/16/2021 127.7  mmHg Final   • MR PISA 04/16/2021 1.6  cm^2 Final   • MR flow rate 04/16/2021 48.4  cm^3/sec Final   • MR ERO 04/16/2021 0.09  cm^2 Final   • MR PISA radius 04/16/2021 0.5  cm Final   • MR alias nam 04/16/2021 30.8  cm/sec Final   • SV(Ao) 04/16/2021 404.9  ml Final   • SI(Ao) 04/16/2021 166.8  ml/m^2 Final   • SV(LVOT) 04/16/2021 141.6  ml Final   • SI(LVOT) 04/16/2021 58.3  ml/m^2 Final   • PA V2 max 04/16/2021 102.0  cm/sec Final   • PA max PG 04/16/2021 4.2  mmHg Final   • PA V2 mean 04/16/2021 70.2  cm/sec Final   • PA mean PG 04/16/2021 2.0  mmHg Final   • PA V2 VTI 04/16/2021 25.7  cm Final   • TR max nam 04/16/2021 243.0  cm/sec Final   • MVA P1/2T LCG 04/16/2021 2.3  cm^2 Final   • BH CV ECHO JEAN CARLOS - BZI_BMI 04/16/2021 36.9  kilograms/m^2 Final   • BH CV ECHO JEAN CARLOS - BSA(HAYCOCK) 04/16/2021 2.5  m^2 Final   • BH CV ECHO JEAN CARLOS - BZI_METRIC_WEIGHT 04/16/2021 123.4  kg Final   • BH CV ECHO JEAN CARLOS - BZI_METRIC_HEIGHT 04/16/2021 182.9  cm Final   • BH CV VAS BP LEFT ARM 04/16/2021 134/88  mmHg Final   • Echo EF Estimated 04/16/2021 63  % Final   Office Visit on 03/24/2021   Component Date Value Ref Range Status   • QT Interval 03/24/2021 408  ms Final   • QTC Interval 03/24/2021 397  ms Final      Adult Transthoracic Echo Complete w/ Color, Spectral and Contrast if Necessary Per Protocol  · Left ventricular wall thickness is consistent with mild concentric   hypertrophy.  · Estimated left ventricular EF = 63% Left ventricular ejection fraction   appears to be 61 - 65%. Left ventricular systolic function is normal.  · Left ventricular diastolic function is consistent with (grade I)   impaired relaxation.  · The right ventricular cavity is mildly dilated.  · Estimated right ventricular systolic pressure from tricuspid   regurgitation is normal (<35 mmHg).  · Mild dilation of the aortic root is present (3.8 cms), Mild dilation of   the proximal aorta is present (4.1 cms)        @[facility]@    There is no immunization history on file for this patient.  The following portions of the patient's history were reviewed and updated as appropriate: allergies, current medications, past family history, past medical history, past social history, past surgical history and problem list.    PHQ-9 Total Score:           Physical Exam  Constitutional:       Appearance: Normal appearance.   HENT:      Head: Normocephalic and atraumatic.      Right Ear: External ear normal.      Left Ear: External ear normal.   Eyes:      General:         Right eye: No discharge.         Left eye: No discharge.      Conjunctiva/sclera: Conjunctivae normal.   Cardiovascular:      Rate and Rhythm: Normal rate and regular rhythm.      Pulses: Normal pulses.      Heart sounds: Murmur heard.     Pulmonary:      Effort: Pulmonary effort is normal. No respiratory distress.      Breath sounds: Normal breath sounds.   Abdominal:      General: There is no distension.      Palpations: Abdomen is soft.      Tenderness: There is no abdominal tenderness.   Musculoskeletal:      Cervical back: Normal range of motion.      Right lower leg: No edema.      Left lower leg: No edema.   Lymphadenopathy:      Cervical: No cervical adenopathy.   Neurological:      Mental Status: She is alert. Mental status is at baseline.   Psychiatric:         Mood and Affect: Mood normal.         Behavior: Behavior normal.       Assessment/Plan    Diagnosis Plan   1. Vitamin D deficiency  Vitamin D 25 Hydroxy   2. Iron deficiency anemia, unspecified iron deficiency anemia type  CBC & Differential    Ferritin    Iron Profile   3. Stage 3b chronic kidney disease (CMS/HCC)  Comprehensive Metabolic Panel   4. Aortic dissection, thoracic (CMS/HCC)     5. Need for hepatitis C screening test  HCV Antibody Rfx To Qnt PCR   6. Lipid screening  Lipid Panel      Orders Placed This Encounter   Procedures   • Comprehensive Metabolic Panel     Order Specific Question:    Release to patient     Answer:   Immediate   • Ferritin     Order Specific Question:   Release to patient     Answer:   Immediate   • Iron Profile   • Vitamin D 25 Hydroxy     Order Specific Question:   Release to patient     Answer:   Immediate   • HCV Antibody Rfx To Qnt PCR     Order Specific Question:   Release to patient     Answer:   Immediate   • Lipid Panel   • CBC & Differential     Order Specific Question:   Manual Differential     Answer:   No     Multiple chronic medical issues as above.  Currently stable with appropriate follow-up.  Tolerating medication well, no adverse effects.  Will obtain labs above, adjust as needed.  L due to recent hysterectomy, likely will not longer need iron supplementation, counseled pt. follow-up in 3 months, sooner if needed.        This document has been electronically signed by Marco A Stanley MD on June 21, 2021 09:01 CDT

## 2021-06-22 LAB
HCV AB S/CO SERPL IA: <0.1 S/CO RATIO (ref 0–0.9)
HCV AB SERPL QL IA: NORMAL

## 2021-07-13 ENCOUNTER — OFFICE VISIT (OUTPATIENT)
Dept: OBSTETRICS AND GYNECOLOGY | Facility: CLINIC | Age: 41
End: 2021-07-13

## 2021-07-13 VITALS
SYSTOLIC BLOOD PRESSURE: 148 MMHG | DIASTOLIC BLOOD PRESSURE: 80 MMHG | BODY MASS INDEX: 36.84 KG/M2 | WEIGHT: 272 LBS | HEIGHT: 72 IN

## 2021-07-13 DIAGNOSIS — Z90.710 STATUS POST ABDOMINAL HYSTERECTOMY: Primary | ICD-10-CM

## 2021-07-13 DIAGNOSIS — Z09 POSTOPERATIVE FOLLOW-UP: ICD-10-CM

## 2021-07-13 PROCEDURE — 99024 POSTOP FOLLOW-UP VISIT: CPT | Performed by: OBSTETRICS & GYNECOLOGY

## 2021-07-14 NOTE — PROGRESS NOTES
Chief complaint: Postoperative follow-up    Patient presents for postop follow-up status post abdominal hysterectomy approximately 5 weeks ago.  Patient is overall doing well and without major complaints at this time.  She is ambulating without difficulty, tolerating a regular diet.  She is urinating without difficulty.  Pain is well controlled with pain medications at this time.  No questions or concerns at this time.    Vital signs reviewed  No acute distress  Awake and oriented x3  Abdomen soft appropriately tender, well-healed Pfannenstiel incision    Patient is status post total abdominal hysterectomy doing well and recovering appropriately at this time.  Patient is meeting appropriate milestones at this time.  Patient to return to see me as needed.  May return to normal full activity in 2 weeks.

## 2021-09-21 ENCOUNTER — OFFICE VISIT (OUTPATIENT)
Dept: FAMILY MEDICINE CLINIC | Facility: CLINIC | Age: 41
End: 2021-09-21

## 2021-09-21 VITALS
SYSTOLIC BLOOD PRESSURE: 130 MMHG | DIASTOLIC BLOOD PRESSURE: 78 MMHG | TEMPERATURE: 97.3 F | HEIGHT: 72 IN | OXYGEN SATURATION: 100 % | WEIGHT: 273.8 LBS | HEART RATE: 74 BPM | BODY MASS INDEX: 37.08 KG/M2

## 2021-09-21 DIAGNOSIS — Z23 NEED FOR COVID-19 VACCINE: ICD-10-CM

## 2021-09-21 DIAGNOSIS — I10 ESSENTIAL HYPERTENSION: Primary | ICD-10-CM

## 2021-09-21 DIAGNOSIS — R09.81 NASAL CONGESTION WITH RHINORRHEA: ICD-10-CM

## 2021-09-21 DIAGNOSIS — J34.89 NASAL CONGESTION WITH RHINORRHEA: ICD-10-CM

## 2021-09-21 PROCEDURE — 99213 OFFICE O/P EST LOW 20 MIN: CPT | Performed by: STUDENT IN AN ORGANIZED HEALTH CARE EDUCATION/TRAINING PROGRAM

## 2021-09-21 RX ORDER — LOSARTAN POTASSIUM 100 MG/1
TABLET ORAL
Qty: 90 TABLET | Refills: 3 | Status: SHIPPED | OUTPATIENT
Start: 2021-09-21 | End: 2022-09-26

## 2021-09-21 RX ORDER — LOSARTAN POTASSIUM 100 MG/1
100 TABLET ORAL DAILY
Qty: 90 TABLET | Refills: 3 | Status: SHIPPED | OUTPATIENT
Start: 2021-09-21 | End: 2021-09-21 | Stop reason: SDUPTHER

## 2021-09-21 RX ORDER — FLUTICASONE PROPIONATE 50 MCG
2 SPRAY, SUSPENSION (ML) NASAL DAILY
Qty: 18.2 ML | Refills: 5 | Status: SHIPPED | OUTPATIENT
Start: 2021-09-21 | End: 2022-10-26

## 2021-09-21 NOTE — PROGRESS NOTES
"Subjective:  Marta Chambers is a 40 y.o. female who presents for     Iron deficiency anemia; patient had hysterectomy recently, with hopes that she would no longer need iron supplementation.  Labs rechecked still significant for mild iron deficiency anemia.  Denies fatigue, easy bleeding, easy bruising, palpitations, chest pain, shortness of breath.    HTN; states is doing well,  BP is ranging from 120-130/70-80.  Denied headache, vision changes, numbness, tingling, weakness, leg swelling, abdominal pain.  Currently on losartan 100 mg daily, amlodipine 10 mg daily, metoprolol 50 mg twice daily.  Tolerating medications well, no adverse effects.  Needs refill on losartan today.    Seasonal allergies; patient gets rhinorrhea during allergy season, manages with Flonase, providing good relief, no adverse effects medication.  No nosebleed, headache, sore throat, fever, chills, loss of taste, loss of smell.    Patient Active Problem List   Diagnosis   • Aortic dissection, thoracic (CMS/HCC)   • H/O aortic arch repair   • History of aortic root repair   • CKD (chronic kidney disease)   • Essential hypertension   • Sjogren's syndrome (CMS/HCC)   • Chronic renal insufficiency, stage III (moderate) (CMS/HCC)   • Uterine leiomyoma   • Dysmenorrhea   • Pelvic pain   • Nonrheumatic aortic valve disorder   • Intramural leiomyoma of uterus   • Abnormal uterine bleeding (AUB)   • Menorrhagia with regular cycle   • Status post abdominal hysterectomy     Vitals:    Vitals:    09/21/21 0821   BP: 130/78   BP Location: Left arm   Patient Position: Sitting   Cuff Size: Adult   Pulse: 74   Temp: 97.3 °F (36.3 °C)   SpO2: 100%   Weight: 124 kg (273 lb 12.8 oz)   Height: 182.9 cm (72\")     Body mass index is 37.13 kg/m².      Current Outpatient Medications:   •  acetaminophen (TYLENOL) 500 MG tablet, Take 2 tablets by mouth Every 8 (Eight) Hours., Disp: 60 tablet, Rfl: 2  •  amLODIPine (NORVASC) 10 MG tablet, Take 1 tablet by mouth " Daily., Disp: 90 tablet, Rfl: 3  •  aspirin (aspirin) 81 MG EC tablet, Take 81 mg by mouth Every Other Day., Disp: , Rfl:   •  ferrous sulfate 325 (65 FE) MG tablet, Take 325 mg by mouth Every Other Day., Disp: , Rfl:   •  fluticasone (Flonase) 50 MCG/ACT nasal spray, 2 sprays into the nostril(s) as directed by provider Daily., Disp: 18.2 mL, Rfl: 5  •  hydrocortisone (ANUSOL-HC) 25 MG suppository, Insert 1 suppository into the rectum 2 (Two) Times a Day As Needed for Hemorrhoids., Disp: 24 suppository, Rfl: 3  •  losartan (COZAAR) 100 MG tablet, 1/2 tablet in the morning and 1/2 tablet at night, Disp: 90 tablet, Rfl: 3  •  metoprolol tartrate (LOPRESSOR) 50 MG tablet, Take 50 mg by mouth 2 (Two) Times a Day., Disp: , Rfl:   •  Pediatric Multivitamins-Iron (CHILDRENS MULTIVITAMIN/IRON PO), Take  by mouth Daily., Disp: , Rfl:   •  triamcinolone (KENALOG) 0.1 % cream, Apply  topically to the appropriate area as directed As Needed., Disp: , Rfl:   •  vitamin D3 125 MCG (5000 UT) capsule capsule, Take 5,000 Units by mouth Every Other Day., Disp: , Rfl:   •  vitamin D (ERGOCALCIFEROL) 1.25 MG (96368 UT) capsule capsule, Take 1 capsule by mouth 1 (One) Time Per Week. (Patient taking differently: Take 50,000 Units by mouth 1 (One) Time Per Week. thursday), Disp: 4 capsule, Rfl: 3    Patient Active Problem List   Diagnosis   • Aortic dissection, thoracic (CMS/HCC)   • H/O aortic arch repair   • History of aortic root repair   • CKD (chronic kidney disease)   • Essential hypertension   • Sjogren's syndrome (CMS/HCC)   • Chronic renal insufficiency, stage III (moderate) (CMS/HCC)   • Uterine leiomyoma   • Dysmenorrhea   • Pelvic pain   • Nonrheumatic aortic valve disorder   • Intramural leiomyoma of uterus   • Abnormal uterine bleeding (AUB)   • Menorrhagia with regular cycle   • Status post abdominal hysterectomy     Past Surgical History:   Procedure Laterality Date   • ASCENDING ARCH/HEMIARCH REPLACEMENT     • CARDIAC  VALVE SURGERY     • HAND SURGERY Left     secondary to trauma   • TOTAL ABDOMINAL HYSTERECTOMY Bilateral 2021    Procedure: TOTAL ABDOMINAL HYSTERECTOMY, bilateral salpingectomy, cystoscopy;  Surgeon: Bo Mccallum DO;  Location: [facility];  Service: Obstetrics/Gynecology;  Laterality: Bilateral;   • TUBAL ABDOMINAL LIGATION       Social History     Socioeconomic History   • Marital status: Single     Spouse name: Not on file   • Number of children: Not on file   • Years of education: Not on file   • Highest education level: Not on file   Tobacco Use   • Smoking status: Former Smoker     Packs/day: 1.00     Years: 24.00     Pack years: 24.00     Types: Cigarettes     Quit date: 2021     Years since quittin.2   • Smokeless tobacco: Never Used   Vaping Use   • Vaping Use: Former   • Substances: Flavoring   Substance and Sexual Activity   • Alcohol use: Yes     Comment: social   • Drug use: No   • Sexual activity: Defer     Family History   Problem Relation Age of Onset   • Sudden death Father         cardiac   • Other Father         ruptured aortic aneurysm   • Aortic dissection Father    • Diabetes Other    • Hypertension Other    • Diabetes Mother    • Hypertension Mother    • COPD Mother    • Asthma Brother    • Hypertension Sister      Office Visit on 2021   Component Date Value Ref Range Status   • Glucose 2021 65  65 - 99 mg/dL Final   • BUN 2021 19  6 - 20 mg/dL Final   • Creatinine 2021 1.74* 0.57 - 1.00 mg/dL Final   • Sodium 2021 137  136 - 145 mmol/L Final   • Potassium 2021 4.1  3.5 - 5.2 mmol/L Final   • Chloride 2021 106  98 - 107 mmol/L Final   • CO2 2021 23.4  22.0 - 29.0 mmol/L Final   • Calcium 2021 9.4  8.6 - 10.5 mg/dL Final   • Total Protein 2021 7.6  6.0 - 8.5 g/dL Final   • Albumin 2021 4.10  3.50 - 5.20 g/dL Final   • ALT (SGPT) 2021 14  1 - 33 U/L Final   • AST (SGOT) 2021 13  1 - 32 U/L Final    • Alkaline Phosphatase 06/21/2021 46  39 - 117 U/L Final   • Total Bilirubin 06/21/2021 <0.2  0.0 - 1.2 mg/dL Final   • eGFR   Amer 06/21/2021 39* >60 mL/min/1.73 Final   • Globulin 06/21/2021 3.5  gm/dL Final   • A/G Ratio 06/21/2021 1.2  g/dL Final   • BUN/Creatinine Ratio 06/21/2021 10.9  7.0 - 25.0 Final   • Anion Gap 06/21/2021 7.6  5.0 - 15.0 mmol/L Final   • Ferritin 06/21/2021 41.90  13.00 - 150.00 ng/mL Final   • Iron 06/21/2021 33* 37 - 145 mcg/dL Final   • Iron Saturation 06/21/2021 9* 20 - 50 % Final   • Transferrin 06/21/2021 234  200 - 360 mg/dL Final   • TIBC 06/21/2021 349  298 - 536 mcg/dL Final   • 25 Hydroxy, Vitamin D 06/21/2021 46.7  ng/ml Final   • Hepatitis C Ab 06/21/2021 <0.1  0.0 - 0.9 s/co ratio Final   • Total Cholesterol 06/21/2021 183  0 - 200 mg/dL Final   • Triglycerides 06/21/2021 77  0 - 150 mg/dL Final   • HDL Cholesterol 06/21/2021 53  40 - 60 mg/dL Final   • LDL Cholesterol  06/21/2021 116* 0 - 100 mg/dL Final   • VLDL Cholesterol 06/21/2021 14  5 - 40 mg/dL Final   • LDL/HDL Ratio 06/21/2021 2.16   Final   • WBC 06/21/2021 5.85  3.40 - 10.80 10*3/mm3 Final   • RBC 06/21/2021 3.83  3.77 - 5.28 10*6/mm3 Final   • Hemoglobin 06/21/2021 11.5* 12.0 - 15.9 g/dL Final   • Hematocrit 06/21/2021 35.3  34.0 - 46.6 % Final   • MCV 06/21/2021 92.2  79.0 - 97.0 fL Final   • MCH 06/21/2021 30.0  26.6 - 33.0 pg Final   • MCHC 06/21/2021 32.6  31.5 - 35.7 g/dL Final   • RDW 06/21/2021 13.9  12.3 - 15.4 % Final   • RDW-SD 06/21/2021 46.5  37.0 - 54.0 fl Final   • MPV 06/21/2021 11.1  6.0 - 12.0 fL Final   • Platelets 06/21/2021 278  140 - 450 10*3/mm3 Final   • Neutrophil % 06/21/2021 70.6  42.7 - 76.0 % Final   • Lymphocyte % 06/21/2021 18.6* 19.6 - 45.3 % Final   • Monocyte % 06/21/2021 7.5  5.0 - 12.0 % Final   • Eosinophil % 06/21/2021 2.7  0.3 - 6.2 % Final   • Basophil % 06/21/2021 0.3  0.0 - 1.5 % Final   • Immature Grans % 06/21/2021 0.3  0.0 - 0.5 % Final   • Neutrophils,  Absolute 06/21/2021 4.12  1.70 - 7.00 10*3/mm3 Final   • Lymphocytes, Absolute 06/21/2021 1.09  0.70 - 3.10 10*3/mm3 Final   • Monocytes, Absolute 06/21/2021 0.44  0.10 - 0.90 10*3/mm3 Final   • Eosinophils, Absolute 06/21/2021 0.16  0.00 - 0.40 10*3/mm3 Final   • Basophils, Absolute 06/21/2021 0.02  0.00 - 0.20 10*3/mm3 Final   • Immature Grans, Absolute 06/21/2021 0.02  0.00 - 0.05 10*3/mm3 Final   • nRBC 06/21/2021 0.0  0.0 - 0.2 /100 WBC Final   • Interpretation 06/21/2021 Comment   Final    Negative  Not infected with HCV, unless recent infection is suspected or other  evidence exists to indicate HCV infection.   Admission on 06/07/2021, Discharged on 06/09/2021   Component Date Value Ref Range Status   • ABO Type 06/07/2021 O   Final   • RH type 06/07/2021 Positive   Final   • Antibody Screen 06/07/2021 Negative   Final   • T&S Expiration Date 06/07/2021 6/10/2021 11:59:59 PM   Final   • HCG, Urine QL 06/07/2021 Negative  Negative Final   • Previous History 06/07/2021 No record on File   Final   • Case Report 06/07/2021    Final                    Value:Surgical Pathology Report                         Case: [id number]                                  Authorizing Provider:  Bo Mccallum DO Collected:           06/07/2021 12:45 PM          Ordering Location:     [facility]             Received:            06/07/2021 01:41 PM                                 [place]                                                              Pathologist:           Yossi Desir MD                                                            Specimen:    Uterus, Cervix, Bilateral Fallopian Tubes , Uterus, cervix, bilateral tubes and                     fibroids                                                                                  • Final Diagnosis 06/07/2021    Final                    Value:This result contains rich text formatting which cannot be displayed here.   • WBC 06/08/2021 8.66  3.40 -  10.80 10*3/mm3 Final   • RBC 06/08/2021 3.59* 3.77 - 5.28 10*6/mm3 Final   • Hemoglobin 06/08/2021 10.9* 12.0 - 15.9 g/dL Final   • Hematocrit 06/08/2021 32.8* 34.0 - 46.6 % Final   • MCV 06/08/2021 91.4  79.0 - 97.0 fL Final   • MCH 06/08/2021 30.4  26.6 - 33.0 pg Final   • MCHC 06/08/2021 33.2  31.5 - 35.7 g/dL Final   • RDW 06/08/2021 14.6  12.3 - 15.4 % Final   • RDW-SD 06/08/2021 49.3  37.0 - 54.0 fl Final   • MPV 06/08/2021 10.6  6.0 - 12.0 fL Final   • Platelets 06/08/2021 172  140 - 450 10*3/mm3 Final   Pre-Admission Testing on 06/04/2021   Component Date Value Ref Range Status   • Glucose 06/04/2021 69  65 - 99 mg/dL Final   • BUN 06/04/2021 25* 6 - 20 mg/dL Final   • Creatinine 06/04/2021 2.08* 0.57 - 1.00 mg/dL Final   • Sodium 06/04/2021 140  136 - 145 mmol/L Final   • Potassium 06/04/2021 4.8  3.5 - 5.2 mmol/L Final   • Chloride 06/04/2021 111* 98 - 107 mmol/L Final   • CO2 06/04/2021 24.0  22.0 - 29.0 mmol/L Final   • Calcium 06/04/2021 9.4  8.6 - 10.5 mg/dL Final   • eGFR   Amer 06/04/2021 32* >60 mL/min/1.73 Final   • BUN/Creatinine Ratio 06/04/2021 12.0  7.0 - 25.0 Final   • Anion Gap 06/04/2021 5.0  5.0 - 15.0 mmol/L Final   • HCG Qualitative 06/04/2021 Negative  Negative Final   • WBC 06/04/2021 5.64  3.40 - 10.80 10*3/mm3 Final   • RBC 06/04/2021 4.23  3.77 - 5.28 10*6/mm3 Final   • Hemoglobin 06/04/2021 12.6  12.0 - 15.9 g/dL Final   • Hematocrit 06/04/2021 39.2  34.0 - 46.6 % Final   • MCV 06/04/2021 92.7  79.0 - 97.0 fL Final   • MCH 06/04/2021 29.8  26.6 - 33.0 pg Final   • MCHC 06/04/2021 32.1  31.5 - 35.7 g/dL Final   • RDW 06/04/2021 14.6  12.3 - 15.4 % Final   • RDW-SD 06/04/2021 49.7  37.0 - 54.0 fl Final   • MPV 06/04/2021 11.4  6.0 - 12.0 fL Final   • Platelets 06/04/2021 201  140 - 450 10*3/mm3 Final   • Neutrophil % 06/04/2021 65.4  42.7 - 76.0 % Final   • Lymphocyte % 06/04/2021 21.8  19.6 - 45.3 % Final   • Monocyte % 06/04/2021 9.9  5.0 - 12.0 % Final   • Eosinophil %  06/04/2021 2.1  0.3 - 6.2 % Final   • Basophil % 06/04/2021 0.4  0.0 - 1.5 % Final   • Immature Grans % 06/04/2021 0.4  0.0 - 0.5 % Final   • Neutrophils, Absolute 06/04/2021 3.69  1.70 - 7.00 10*3/mm3 Final   • Lymphocytes, Absolute 06/04/2021 1.23  0.70 - 3.10 10*3/mm3 Final   • Monocytes, Absolute 06/04/2021 0.56  0.10 - 0.90 10*3/mm3 Final   • Eosinophils, Absolute 06/04/2021 0.12  0.00 - 0.40 10*3/mm3 Final   • Basophils, Absolute 06/04/2021 0.02  0.00 - 0.20 10*3/mm3 Final   • Immature Grans, Absolute 06/04/2021 0.02  0.00 - 0.05 10*3/mm3 Final   • nRBC 06/04/2021 0.0  0.0 - 0.2 /100 WBC Final   Lab on 06/04/2021   Component Date Value Ref Range Status   • COVID19 06/04/2021 Not Detected  Not Detected - Ref. Range Final   Procedure visit on 05/06/2021   Component Date Value Ref Range Status   • Case Report 05/06/2021    Final                    Value:Surgical Pathology Report                         Case: [id number]                                  Authorizing Provider:  Bo Mccallum DO Collected:           05/06/2021 10:32 AM          Ordering Location:     [facility]     Received:            05/06/2021 03:23 PM                                 [facility]                                                                 Pathologist:           Frederick Kathleen MD                                                          Specimen:    Endometrium, EMB                                                                          • Clinical Information 05/06/2021    Final                    Value:This result contains rich text formatting which cannot be displayed here.   • Final Diagnosis 05/06/2021    Final                    Value:This result contains rich text formatting which cannot be displayed here.   Ancillary Procedure on 04/16/2021   Component Date Value Ref Range Status   • BSA 04/16/2021 2.4  m^2 Final   • RVIDd 04/16/2021 3.8  cm Final   • IVSd 04/16/2021 1.3  cm Final   • LVIDd  04/16/2021 5.2  cm Final   • LVIDs 04/16/2021 3.6  cm Final   • LVPWd 04/16/2021 1.2  cm Final   • IVS/LVPW 04/16/2021 1.1   Final   • FS 04/16/2021 30.8  % Final   • EDV(Teich) 04/16/2021 129.5  ml Final   • ESV(Teich) 04/16/2021 54.4  ml Final   • EF(Teich) 04/16/2021 58.0  % Final   • EDV(cubed) 04/16/2021 140.6  ml Final   • ESV(cubed) 04/16/2021 46.7  ml Final   • EF(cubed) 04/16/2021 66.8  % Final   • LV mass(C)d 04/16/2021 263.5  grams Final   • LV mass(C)dI 04/16/2021 108.5  grams/m^2 Final   • SV(Teich) 04/16/2021 75.1  ml Final   • SI(Teich) 04/16/2021 30.9  ml/m^2 Final   • SV(cubed) 04/16/2021 94.0  ml Final   • SI(cubed) 04/16/2021 38.7  ml/m^2 Final   • EPSS 04/16/2021 0.6  cm Final   • Ao root diam 04/16/2021 3.8  cm Final   • Ao root area 04/16/2021 11.3  cm^2 Final   • asc Aorta Diam 04/16/2021 3.7  cm Final   • LVOT diam 04/16/2021 2.4  cm Final   • LVOT area 04/16/2021 4.5  cm^2 Final   • LVOT area(traced) 04/16/2021 4.5  cm^2 Final   • LVLd ap4 04/16/2021 9.0  cm Final   • EDV(MOD-sp4) 04/16/2021 118.0  ml Final   • LVLs ap4 04/16/2021 8.0  cm Final   • ESV(MOD-sp4) 04/16/2021 46.0  ml Final   • EF(MOD-sp4) 04/16/2021 61.0  % Final   • LVLd ap2 04/16/2021 8.9  cm Final   • EDV(MOD-sp2) 04/16/2021 103.0  ml Final   • LVLs ap2 04/16/2021 8.0  cm Final   • ESV(MOD-sp2) 04/16/2021 41.0  ml Final   • EF(MOD-sp2) 04/16/2021 60.2  % Final   • SV(MOD-sp4) 04/16/2021 72.0  ml Final   • SI(MOD-sp4) 04/16/2021 29.7  ml/m^2 Final   • SV(MOD-sp2) 04/16/2021 62.0  ml Final   • SI(MOD-sp2) 04/16/2021 25.5  ml/m^2 Final   • Ao root area (BSA corrected) 04/16/2021 1.6   Final   • LV [place] Vol (BSA corrected) 04/16/2021 48.6  ml/m^2 Final   • LV Sys Vol (BSA corrected) 04/16/2021 18.9  ml/m^2 Final   • MV E max nam 04/16/2021 89.8  cm/sec Final   • MV A max nam 04/16/2021 54.8  cm/sec Final   • MV E/A 04/16/2021 1.6   Final   • MV V2 max 04/16/2021 99.2  cm/sec Final   • MV max PG 04/16/2021 3.9  mmHg Final   • MV  V2 mean 04/16/2021 49.1  cm/sec Final   • MV mean PG 04/16/2021 1.0  mmHg Final   • MV V2 VTI 04/16/2021 30.6  cm Final   • MVA(VTI) 04/16/2021 4.6  cm^2 Final   • MV P1/2t max nam 04/16/2021 94.1  cm/sec Final   • MV P1/2t 04/16/2021 86.9  msec Final   • MVA(P1/2t) 04/16/2021 2.5  cm^2 Final   • MV dec slope 04/16/2021 317.0  cm/sec^2 Final   • Ao pk nam 04/16/2021 142.0  cm/sec Final   • Ao max PG 04/16/2021 8.1  mmHg Final   • Ao max PG (full) 04/16/2021 1.5  mmHg Final   • Ao V2 mean 04/16/2021 106.0  cm/sec Final   • Ao mean PG 04/16/2021 5.0  mmHg Final   • Ao mean PG (full) 04/16/2021 2.0  mmHg Final   • Ao V2 VTI 04/16/2021 35.7  cm Final   • SHER(I,A) 04/16/2021 4.0  cm^2 Final   • SHER(I,D) 04/16/2021 4.0  cm^2 Final   • SHER(V,A) 04/16/2021 4.1  cm^2 Final   • SHER(V,D) 04/16/2021 4.1  cm^2 Final   • AI max nam 04/16/2021 444.0  cm/sec Final   • AI max PG 04/16/2021 78.9  mmHg Final   • AI dec slope 04/16/2021 251.0  cm/sec^2 Final   • AI P1/2t 04/16/2021 518.1  msec Final   • LV V1 max PG 04/16/2021 6.6  mmHg Final   • LV V1 mean PG 04/16/2021 3.0  mmHg Final   • LV V1 max 04/16/2021 128.0  cm/sec Final   • LV V1 mean 04/16/2021 88.1  cm/sec Final   • LV V1 VTI 04/16/2021 31.3  cm Final   • MR max nam 04/16/2021 565.0  cm/sec Final   • MR max PG 04/16/2021 127.7  mmHg Final   • MR PISA 04/16/2021 1.6  cm^2 Final   • MR flow rate 04/16/2021 48.4  cm^3/sec Final   • MR ERO 04/16/2021 0.09  cm^2 Final   • MR PISA radius 04/16/2021 0.5  cm Final   • MR alias nam 04/16/2021 30.8  cm/sec Final   • SV(Ao) 04/16/2021 404.9  ml Final   • SI(Ao) 04/16/2021 166.8  ml/m^2 Final   • SV(LVOT) 04/16/2021 141.6  ml Final   • SI(LVOT) 04/16/2021 58.3  ml/m^2 Final   • PA V2 max 04/16/2021 102.0  cm/sec Final   • PA max PG 04/16/2021 4.2  mmHg Final   • PA V2 mean 04/16/2021 70.2  cm/sec Final   • PA mean PG 04/16/2021 2.0  mmHg Final   • PA V2 VTI 04/16/2021 25.7  cm Final   • TR max nam 04/16/2021 243.0  cm/sec Final   •  MVA P1/2T LCG 04/16/2021 2.3  cm^2 Final   • BH CV ECHO JEAN CARLOS - BZI_BMI 04/16/2021 36.9  kilograms/m^2 Final   • BH CV ECHO JEAN CARLOS - BSA(HAYCOCK) 04/16/2021 2.5  m^2 Final   • BH CV ECHO JEAN CARLOS - BZI_METRIC_WEIGHT 04/16/2021 123.4  kg Final   • BH CV ECHO JEAN CARLOS - BZI_METRIC_HEIGHT 04/16/2021 182.9  cm Final   • BH CV VAS BP LEFT ARM 04/16/2021 134/88  mmHg Final   • Echo EF Estimated 04/16/2021 63  % Final   Office Visit on 03/24/2021   Component Date Value Ref Range Status   • QT Interval 03/24/2021 408  ms Final   • QTC Interval 03/24/2021 397  ms Final      Adult Transthoracic Echo Complete w/ Color, Spectral and Contrast if Necessary Per Protocol  · Left ventricular wall thickness is consistent with mild concentric   hypertrophy.  · Estimated left ventricular EF = 63% Left ventricular ejection fraction   appears to be 61 - 65%. Left ventricular systolic function is normal.  · Left ventricular diastolic function is consistent with (grade I)   impaired relaxation.  · The right ventricular cavity is mildly dilated.  · Estimated right ventricular systolic pressure from tricuspid   regurgitation is normal (<35 mmHg).  · Mild dilation of the aortic root is present (3.8 cms), Mild dilation of   the proximal aorta is present (4.1 cms)         [unfilled]    There is no immunization history on file for this patient.  The following portions of the patient's history were reviewed and updated as appropriate: allergies, current medications, past family history, past medical history, past social history, past surgical history and problem list.    PHQ-9 Total Score: 1         Physical Exam  Constitutional:       Appearance: Normal appearance.   HENT:      Head: Normocephalic and atraumatic.      Right Ear: External ear normal.      Left Ear: External ear normal.   Eyes:      General:         Right eye: No discharge.         Left eye: No discharge.      Conjunctiva/sclera: Conjunctivae normal.   Cardiovascular:      Rate and  Rhythm: Normal rate and regular rhythm.      Pulses: Normal pulses.      Heart sounds: Murmur heard.     Pulmonary:      Effort: Pulmonary effort is normal. No respiratory distress.      Breath sounds: Normal breath sounds.   Abdominal:      General: There is no distension.      Palpations: Abdomen is soft.      Tenderness: There is no abdominal tenderness.   Musculoskeletal:      Cervical back: Normal range of motion.      Right lower leg: No edema.      Left lower leg: No edema.   Lymphadenopathy:      Cervical: No cervical adenopathy.   Neurological:      Mental Status: She is alert. Mental status is at baseline.   Psychiatric:         Mood and Affect: Mood normal.         Behavior: Behavior normal.         Assessment/Plan    Diagnosis Plan   1. Essential hypertension  losartan (COZAAR) 100 MG tablet   2. Need for COVID-19 vaccine     3. Nasal congestion with rhinorrhea  fluticasone (Flonase) 50 MCG/ACT nasal spray      No orders of the defined types were placed in this encounter.    Hypertension; needs refill on losartan, doing well on current regiment, will refill.  Labs appropriate, follow-up in 6 months, sooner if needed.    Due for COVID-19 vaccine; patient hesitant to get vaccine due to concerns for side effects, advised on importance, minimal side effect profile of vaccine, benefit to vaccine, patient to consider.    Seasonal allergies; patient needs refill on Flonase, tolerating medication well, no adverse effects, providing good relief, will refill.      This document has been electronically signed by Marco A Stanley MD on September 21, 2021 13:29 CDT

## 2021-09-27 DIAGNOSIS — E55.9 VITAMIN D DEFICIENCY: ICD-10-CM

## 2021-09-27 NOTE — TELEPHONE ENCOUNTER
Rx Refill Note  Requested Prescriptions     Pending Prescriptions Disp Refills   • vitamin D (ERGOCALCIFEROL) 1.25 MG (31850 UT) capsule capsule [Pharmacy Med Name: VIT D2 (ERGOCAL) 1.25MG(50,000U) CP] 4 capsule 3     Sig: TAKE ONE CAPSULE BY MOUTH ONCE WEEKLY      Last office visit with prescribing clinician: 9/21/2021      Next office visit with prescribing clinician: 3/21/2022            Shine Lopez Rep  09/27/21, 08:17 CDT       On 6/21/21, pt's vit D was 46.7

## 2021-09-29 RX ORDER — ERGOCALCIFEROL 1.25 MG/1
CAPSULE ORAL
Qty: 4 CAPSULE | Refills: 3 | Status: SHIPPED | OUTPATIENT
Start: 2021-09-29 | End: 2021-10-27

## 2021-10-06 ENCOUNTER — OFFICE VISIT (OUTPATIENT)
Dept: CARDIOLOGY | Facility: CLINIC | Age: 41
End: 2021-10-06

## 2021-10-06 VITALS
BODY MASS INDEX: 36.98 KG/M2 | HEIGHT: 72 IN | HEART RATE: 75 BPM | OXYGEN SATURATION: 100 % | WEIGHT: 273 LBS | SYSTOLIC BLOOD PRESSURE: 146 MMHG | DIASTOLIC BLOOD PRESSURE: 88 MMHG | TEMPERATURE: 96.6 F

## 2021-10-06 DIAGNOSIS — Z98.890 HISTORY OF AORTIC ROOT REPAIR: ICD-10-CM

## 2021-10-06 DIAGNOSIS — I10 ESSENTIAL HYPERTENSION: ICD-10-CM

## 2021-10-06 DIAGNOSIS — I71.019 AORTIC DISSECTION, THORACIC (HCC): Primary | ICD-10-CM

## 2021-10-06 DIAGNOSIS — Z98.890 H/O AORTIC ARCH REPAIR: ICD-10-CM

## 2021-10-06 DIAGNOSIS — I35.9 NONRHEUMATIC AORTIC VALVE DISORDER: ICD-10-CM

## 2021-10-06 PROCEDURE — 99213 OFFICE O/P EST LOW 20 MIN: CPT | Performed by: INTERNAL MEDICINE

## 2021-10-06 RX ORDER — AMITRIPTYLINE HYDROCHLORIDE 10 MG/1
10 TABLET, FILM COATED ORAL
COMMUNITY
End: 2022-03-21

## 2021-10-06 RX ORDER — LABETALOL 100 MG/1
100 TABLET, FILM COATED ORAL 2 TIMES DAILY
Qty: 180 TABLET | Refills: 3 | Status: SHIPPED | OUTPATIENT
Start: 2021-10-06 | End: 2021-10-11

## 2021-10-06 NOTE — PROGRESS NOTES
Marta Chambers  40 y.o. female      1. Aortic dissection, thoracic (HCC)    2. Essential hypertension    3. History of aortic root repair    4. H/O aortic arch repair    5. Nonrheumatic aortic valve disorder        History of Present Illness   Ms. Marta Chambers is a 40 y.o. female with a history of obesity and hypertension who presented with Nicholas type A dissection in October 2014 and underwent repair by  in [place], [place].  PROCEDURES PERFORMED:   1. Urgent repair of a type A aortic dissection using a #36 Vascutek  interposition graft to reconstruct the aortic arch as a hemiarch  reconstruction, and a total replacement of the ascending aorta.   2. Resuspension and reconstruction of the aortic root and valve using BioGlue.    Her other medical issues include CKD which is being followed closely by nephrology, obesity and recently she was evaluated by Dr. Mccallum for multiple fibroids in the uterus.    The patient has progressed well and denied any chest pain, shortness of breath or palpitation.  She has been compliant with all her medicines.  Her blood pressure today was noted to be elevated at 146/88 millimeters mercury though she did indicate that it is usually normal at home.  Clinical exam did not reveal any bronchospasm or signs of congestive heart failure.  She has quit smoking.    Echocardiogram in April 2021 showed:  · Left ventricular wall thickness is consistent with mild concentric hypertrophy.  · Estimated left ventricular EF = 63% Left ventricular ejection fraction appears to be 61 - 65%. Left ventricular systolic function is normal.  · Mild to moderate aortic valve insufficiency was present.  · Left ventricular diastolic function is consistent with (grade I) impaired relaxation.  · The right ventricular cavity is mildly dilated.  · Estimated right ventricular systolic pressure from tricuspid regurgitation is normal (<35 mmHg).  · Mild dilation of the aortic root is present (3.8 cms), Mild  dilation of the proximal aorta is present (4.1 cms)      No Known Allergies      Past Medical History:   Diagnosis Date   • Acute renal failure (HCC)    • Anemia    • Aneurysm, aortic (HCC)    • Aortic dissection (HCC)    • Arthritis    • Chest pain    • GERD (gastroesophageal reflux disease)    • Hemorrhoids    • Hypertension    • Leaky heart valve    • Obesity    • Sjogren's disease (HCC)    • Stage 3 chronic kidney disease (HCC)    • Uterine leiomyoma 2020         Past Surgical History:   Procedure Laterality Date   • ASCENDING ARCH/HEMIARCH REPLACEMENT     • CARDIAC VALVE SURGERY     • HAND SURGERY Left     secondary to trauma   • TOTAL ABDOMINAL HYSTERECTOMY Bilateral 2021    Procedure: TOTAL ABDOMINAL HYSTERECTOMY, bilateral salpingectomy, cystoscopy;  Surgeon: Bo Mccallum DO;  Location: [facility];  Service: Obstetrics/Gynecology;  Laterality: Bilateral;   • TUBAL ABDOMINAL LIGATION           Family History   Problem Relation Age of Onset   • Sudden death Father         cardiac   • Other Father         ruptured aortic aneurysm   • Aortic dissection Father    • Diabetes Other    • Hypertension Other    • Diabetes Mother    • Hypertension Mother    • COPD Mother    • Asthma Brother    • Hypertension Sister          Social History     Socioeconomic History   • Marital status: Single     Spouse name: Not on file   • Number of children: Not on file   • Years of education: Not on file   • Highest education level: Not on file   Tobacco Use   • Smoking status: Former Smoker     Packs/day: 1.00     Years: 24.00     Pack years: 24.00     Types: Cigarettes     Quit date: 2021     Years since quittin.2   • Smokeless tobacco: Never Used   Vaping Use   • Vaping Use: Former   • Substances: Flavoring   Substance and Sexual Activity   • Alcohol use: Yes     Comment: social   • Drug use: No   • Sexual activity: Defer         Current Outpatient Medications   Medication Sig Dispense Refill   •  "acetaminophen (TYLENOL) 500 MG tablet Take 2 tablets by mouth Every 8 (Eight) Hours. 60 tablet 2   • amitriptyline (ELAVIL) 10 MG tablet Take 10 mg by mouth.     • amLODIPine (NORVASC) 10 MG tablet Take 1 tablet by mouth Daily. 90 tablet 3   • aspirin (aspirin) 81 MG EC tablet Take 81 mg by mouth Every Other Day.     • ferrous sulfate 325 (65 FE) MG tablet Take 325 mg by mouth Every Other Day.     • fluticasone (Flonase) 50 MCG/ACT nasal spray 2 sprays into the nostril(s) as directed by provider Daily. 18.2 mL 5   • hydrocortisone (ANUSOL-HC) 25 MG suppository Insert 1 suppository into the rectum 2 (Two) Times a Day As Needed for Hemorrhoids. 24 suppository 3   • losartan (COZAAR) 100 MG tablet 1/2 tablet in the morning and 1/2 tablet at night 90 tablet 3   • metoprolol tartrate (LOPRESSOR) 50 MG tablet Take 50 mg by mouth 2 (Two) Times a Day.     • Pediatric Multivitamins-Iron (CHILDRENS MULTIVITAMIN/IRON PO) Take  by mouth Daily.     • triamcinolone (KENALOG) 0.1 % cream Apply  topically to the appropriate area as directed As Needed.     • vitamin D (ERGOCALCIFEROL) 1.25 MG (38346 UT) capsule capsule TAKE ONE CAPSULE BY MOUTH ONCE WEEKLY 4 capsule 3     No current facility-administered medications for this visit.         OBJECTIVE    /88   Pulse 75   Temp 96.6 °F (35.9 °C)   Ht 182.9 cm (72\")   Wt 124 kg (273 lb)   LMP 04/25/2021   SpO2 100%   BMI 37.03 kg/m²         Review of Systems : The following systems were reviewed and no changes were noted    Constitutional:  Denies recent weight loss, weight gain, fever or chills, no change in exercise tolerance     HENT:  Denies any hearing loss, epistaxis, hoarseness, or difficulty speaking.     Eyes: Wears eyeglasses or contact lenses     Respiratory:  Denies dyspnea with exertion,no cough, wheezing, or hemoptysis.     Cardiovascular: Negative for palpations, chest pain    Gastrointestinal:  Denies change in bowel habits, dyspepsia, ulcer disease, " hematochezia, or melena.     Endocrine: Negative for cold intolerance, heat intolerance, polydipsia, polyphagia and polyuria.     Genitourinary: CKD, fibroid uterus      Musculoskeletal: Denies any history of arthritic symptoms or back problems     Neurological:  Denies any history of recurrent headaches, strokes, TIA, or seizure disorder.     Hematological: Denies any food allergies, seasonal allergies, bleeding disorders, or lymphadenopathy.     Psychiatric/Behavioral: Denies any history of depression, substance abuse, or change in cognitive function.         Physical Exam : The following systems were reassessed and no changes noted    Constitutional: Cooperative, alert and oriented,  in no acute distress.     HENT:   Head: Normocephalic, normal hair patterns, no masses or tenderness.  Ears, Nose, and Throat: No gross abnormalities. No pallor or cyanosis. Dentition good.   Eyes: EOMS intact, PERRL, conjunctivae and lids unremarkable. Fundoscopic exam and visual fields not performed.   Neck: No palpable masses or adenopathy, no thyromegaly, no JVD, carotid pulses are full and equal bilaterally and without  Bruits.     Cardiovascular: Regular rhythm, S1 and S2 normal, no S3 or S4. 2/6 systolic murmur, no gallops, or rubs detected.     Pulmonary/Chest: Chest: normal symmetry,  normal respiratory excursion, no intercostal retraction, no use of accessory muscles.            Pulmonary: Normal breath sounds. No rales or ronchi.    Abdominal: Abdomen soft, bowel sounds normoactive, no masses, no hepatosplenomegaly, non-tender, no bruits.     Musculoskeletal: No deformities, clubbing, cyanosis, erythema, or edema observed.     Neurological: No gross motor or sensory deficits noted, affect appropriate, oriented to time, person, place.     Skin: Warm and dry to the touch, no apparent skin lesions or masses noted.     Psychiatric: She has a normal mood and affect. Her behavior is normal. Judgment and thought content normal.          Procedures      Lab Results   Component Value Date    WBC 5.85 06/21/2021    HGB 11.5 (L) 06/21/2021    HCT 35.3 06/21/2021    MCV 92.2 06/21/2021     06/21/2021     Lab Results   Component Value Date    GLUCOSE 65 06/21/2021    BUN 19 06/21/2021    CREATININE 1.74 (H) 06/21/2021    EGFRIFAFRI 39 (L) 06/21/2021    BCR 10.9 06/21/2021    CO2 23.4 06/21/2021    CALCIUM 9.4 06/21/2021    ALBUMIN 4.10 06/21/2021    AST 13 06/21/2021    ALT 14 06/21/2021     Lab Results   Component Value Date    CHOL 183 06/21/2021    CHOL 157 06/29/2018    CHOL 159 07/06/2017     Lab Results   Component Value Date    TRIG 77 06/21/2021    TRIG 84 06/29/2018    TRIG 163 07/06/2017     Lab Results   Component Value Date    HDL 53 06/21/2021    HDL 48 (L) 06/29/2018    HDL 43 (L) 07/06/2017     No components found for: LDLCALC  Lab Results   Component Value Date     (H) 06/21/2021    LDL 80 06/29/2018    LDL 83 07/06/2017     No results found for: HDLLDLRATIO  No components found for: CHOLHDL  Lab Results   Component Value Date    HGBA1C 5.3 10/23/2014     Lab Results   Component Value Date    TSH 1.450 06/18/2019           ASSESSMENT AND PLAN  Ms. Chambers is stable with regards to her heart with no evidence of cardiac symptoms at the present time.  I stressed the importance of optimization of blood pressure and have changed to metoprolol tartrate to labetalol 100 mg twice daily.  The dose can be titrated depending on the response.  I have continued other antihypertensive medications including amlodipine, losartan.  Antiplatelet therapy with aspirin has been continued.    We did have discussion about appropriate diet and weight reduction measures.    Diagnoses and all orders for this visit:    1. Aortic dissection, thoracic (HCC) (Primary)    2. Essential hypertension    3. History of aortic root repair    4. H/O aortic arch repair    5. Nonrheumatic aortic valve disorder        Patient's Body mass index is 37.03 kg/m².  BMI is above normal parameters. Recommendations include: exercise counseling and nutrition counseling.  Patient is a non-smoker.    Katerina Solis MD  10/6/2021  14:48 CDT

## 2021-10-11 ENCOUNTER — TELEPHONE (OUTPATIENT)
Dept: CARDIOLOGY | Facility: CLINIC | Age: 41
End: 2021-10-11

## 2021-10-11 NOTE — TELEPHONE ENCOUNTER
----- Message from Katerina Solis MD sent at 10/11/2021  1:27 PM CDT -----  Regarding: FW: Visit Follow-Up Question  Contact: [phone]  If she is not tolerating Labetolol, go back to metoprolol tartarate 50 mg twice a day.  Keep an eye on the blood pressure.  If it starts going up again we could increase the dose of metoprolol tartrate to 1.5 tablet twice a day (75 mg BID  PER EMILY...    Good morning. I was prescribed lobetalol 100mg. I have been experiencing a higher heart rate with this medication getting as high as 92 and feeling shakey. I contacted my pharmacists Sunday and he recommended to reach out to you. I on my own took a dose of metoprolol and my heart rate dropped back in the 60's. My blood pressure this morning is 123/75 pulse 70. I was wondering if I could stay on the metoprolol and maybe increase the dose. Thanks in advance. Marta Chambers

## 2021-10-27 DIAGNOSIS — E55.9 VITAMIN D DEFICIENCY: ICD-10-CM

## 2021-10-27 RX ORDER — ERGOCALCIFEROL 1.25 MG/1
CAPSULE ORAL
Qty: 4 CAPSULE | Refills: 3 | Status: SHIPPED | OUTPATIENT
Start: 2021-10-27 | End: 2022-03-21 | Stop reason: ALTCHOICE

## 2022-01-25 RX ORDER — AMLODIPINE BESYLATE 10 MG/1
10 TABLET ORAL DAILY
Qty: 90 TABLET | Refills: 3 | Status: SHIPPED | OUTPATIENT
Start: 2022-01-25 | End: 2023-01-13 | Stop reason: SDUPTHER

## 2022-03-21 ENCOUNTER — TRANSCRIBE ORDERS (OUTPATIENT)
Dept: LAB | Facility: HOSPITAL | Age: 42
End: 2022-03-21

## 2022-03-21 ENCOUNTER — LAB (OUTPATIENT)
Dept: LAB | Facility: HOSPITAL | Age: 42
End: 2022-03-21

## 2022-03-21 ENCOUNTER — OFFICE VISIT (OUTPATIENT)
Dept: FAMILY MEDICINE CLINIC | Facility: CLINIC | Age: 42
End: 2022-03-21

## 2022-03-21 VITALS
SYSTOLIC BLOOD PRESSURE: 126 MMHG | DIASTOLIC BLOOD PRESSURE: 74 MMHG | BODY MASS INDEX: 36.03 KG/M2 | TEMPERATURE: 98 F | HEART RATE: 65 BPM | OXYGEN SATURATION: 98 % | HEIGHT: 72 IN | WEIGHT: 266 LBS

## 2022-03-21 DIAGNOSIS — Z00.00 ROUTINE GENERAL MEDICAL EXAMINATION AT A HEALTH CARE FACILITY: ICD-10-CM

## 2022-03-21 DIAGNOSIS — K64.9 HEMORRHOIDS, UNSPECIFIED HEMORRHOID TYPE: ICD-10-CM

## 2022-03-21 DIAGNOSIS — I10 ESSENTIAL HYPERTENSION: Primary | ICD-10-CM

## 2022-03-21 DIAGNOSIS — D50.9 IRON DEFICIENCY ANEMIA, UNSPECIFIED IRON DEFICIENCY ANEMIA TYPE: ICD-10-CM

## 2022-03-21 DIAGNOSIS — Z00.00 ROUTINE GENERAL MEDICAL EXAMINATION AT A HEALTH CARE FACILITY: Primary | ICD-10-CM

## 2022-03-21 DIAGNOSIS — E78.5 HYPERLIPIDEMIA, UNSPECIFIED HYPERLIPIDEMIA TYPE: ICD-10-CM

## 2022-03-21 DIAGNOSIS — E55.9 VITAMIN D DEFICIENCY: ICD-10-CM

## 2022-03-21 LAB
25(OH)D3 SERPL-MCNC: 33.6 NG/ML (ref 30–100)
C3 SERPL-MCNC: 129 MG/DL (ref 82–167)
C4 SERPL-MCNC: 31 MG/DL (ref 14–44)
CHOLEST SERPL-MCNC: 187 MG/DL (ref 0–200)
CHROMATIN AB SERPL-ACNC: 11.3 IU/ML (ref 0–14)
CK SERPL-CCNC: 102 U/L (ref 20–180)
CREAT SERPL-MCNC: 2.01 MG/DL (ref 0.57–1)
CRP SERPL-MCNC: <0.3 MG/DL (ref 0–0.5)
DEPRECATED RDW RBC AUTO: 45 FL (ref 37–54)
EGFRCR SERPLBLD CKD-EPI 2021: 31.1 ML/MIN/1.73
ERYTHROCYTE [DISTWIDTH] IN BLOOD BY AUTOMATED COUNT: 13.1 % (ref 12.3–15.4)
ERYTHROCYTE [SEDIMENTATION RATE] IN BLOOD: 32 MM/HR (ref 0–20)
FERRITIN SERPL-MCNC: 39 NG/ML (ref 13–150)
HCT VFR BLD AUTO: 40.6 % (ref 34–46.6)
HDLC SERPL-MCNC: 52 MG/DL (ref 40–60)
HGB BLD-MCNC: 12.9 G/DL (ref 12–15.9)
IRON 24H UR-MRATE: 41 MCG/DL (ref 37–145)
IRON SATN MFR SERPL: 14 % (ref 20–50)
LDLC SERPL CALC-MCNC: 122 MG/DL (ref 0–100)
LDLC/HDLC SERPL: 2.33 {RATIO}
MCH RBC QN AUTO: 30 PG (ref 26.6–33)
MCHC RBC AUTO-ENTMCNC: 31.8 G/DL (ref 31.5–35.7)
MCV RBC AUTO: 94.4 FL (ref 79–97)
PLATELET # BLD AUTO: 240 10*3/MM3 (ref 140–450)
PMV BLD AUTO: 11.1 FL (ref 6–12)
RBC # BLD AUTO: 4.3 10*6/MM3 (ref 3.77–5.28)
TIBC SERPL-MCNC: 301 MCG/DL (ref 298–536)
TRANSFERRIN SERPL-MCNC: 202 MG/DL (ref 200–360)
TRIGL SERPL-MCNC: 68 MG/DL (ref 0–150)
VLDLC SERPL-MCNC: 13 MG/DL (ref 5–40)
WBC NRBC COR # BLD: 5.36 10*3/MM3 (ref 3.4–10.8)

## 2022-03-21 PROCEDURE — 82728 ASSAY OF FERRITIN: CPT | Performed by: STUDENT IN AN ORGANIZED HEALTH CARE EDUCATION/TRAINING PROGRAM

## 2022-03-21 PROCEDURE — 99213 OFFICE O/P EST LOW 20 MIN: CPT | Performed by: STUDENT IN AN ORGANIZED HEALTH CARE EDUCATION/TRAINING PROGRAM

## 2022-03-21 PROCEDURE — 86235 NUCLEAR ANTIGEN ANTIBODY: CPT

## 2022-03-21 PROCEDURE — 85027 COMPLETE CBC AUTOMATED: CPT

## 2022-03-21 PROCEDURE — 86015 ACTIN ANTIBODY EACH: CPT

## 2022-03-21 PROCEDURE — 83540 ASSAY OF IRON: CPT | Performed by: STUDENT IN AN ORGANIZED HEALTH CARE EDUCATION/TRAINING PROGRAM

## 2022-03-21 PROCEDURE — 82565 ASSAY OF CREATININE: CPT

## 2022-03-21 PROCEDURE — 86160 COMPLEMENT ANTIGEN: CPT

## 2022-03-21 PROCEDURE — 82550 ASSAY OF CK (CPK): CPT

## 2022-03-21 PROCEDURE — 86140 C-REACTIVE PROTEIN: CPT

## 2022-03-21 PROCEDURE — 82306 VITAMIN D 25 HYDROXY: CPT | Performed by: STUDENT IN AN ORGANIZED HEALTH CARE EDUCATION/TRAINING PROGRAM

## 2022-03-21 PROCEDURE — 80061 LIPID PANEL: CPT | Performed by: STUDENT IN AN ORGANIZED HEALTH CARE EDUCATION/TRAINING PROGRAM

## 2022-03-21 PROCEDURE — 86431 RHEUMATOID FACTOR QUANT: CPT

## 2022-03-21 PROCEDURE — 84466 ASSAY OF TRANSFERRIN: CPT | Performed by: STUDENT IN AN ORGANIZED HEALTH CARE EDUCATION/TRAINING PROGRAM

## 2022-03-21 PROCEDURE — 86225 DNA ANTIBODY NATIVE: CPT

## 2022-03-21 PROCEDURE — 85652 RBC SED RATE AUTOMATED: CPT

## 2022-03-21 RX ORDER — HYDROCORTISONE ACETATE 25 MG/1
25 SUPPOSITORY RECTAL 2 TIMES DAILY PRN
Qty: 24 SUPPOSITORY | Refills: 3 | Status: SHIPPED | OUTPATIENT
Start: 2022-03-21

## 2022-03-21 NOTE — PROGRESS NOTES
"Subjective:  Marta Chambers is a 41 y.o. female who presents for     Hypertension; currently on losartan 100 mg daily, metoprolol 50 mg twice daily, amlodipine 10 mg daily.  States that blood pressure has been well controlled.  No chest pain, shortness breath, headaches, vision changes, numbness, tingling, weakness.  Additionally, has history of aortic dissection, status post repair in October 2014.  No acute complaints today.    Patient Active Problem List   Diagnosis   • Aortic dissection, thoracic (HCC)   • H/O aortic arch repair   • History of aortic root repair   • CKD (chronic kidney disease)   • Essential hypertension   • Sjogren's syndrome (HCC)   • Chronic renal insufficiency, stage III (moderate) (HCC)   • Uterine leiomyoma   • Dysmenorrhea   • Pelvic pain   • Nonrheumatic aortic valve disorder   • Intramural leiomyoma of uterus   • Abnormal uterine bleeding (AUB)   • Menorrhagia with regular cycle   • Status post abdominal hysterectomy     Vitals:    Vitals:    03/21/22 0826   BP: 126/74   BP Location: Left arm   Patient Position: Sitting   Cuff Size: Large Adult   Pulse: 65   Temp: 98 °F (36.7 °C)   SpO2: 98%   Weight: 121 kg (266 lb)   Height: 182.9 cm (72\")     Body mass index is 36.08 kg/m².      Current Outpatient Medications:   •  acetaminophen (TYLENOL) 500 MG tablet, Take 2 tablets by mouth Every 8 (Eight) Hours., Disp: 60 tablet, Rfl: 2  •  amLODIPine (NORVASC) 10 MG tablet, Take 1 tablet by mouth Daily., Disp: 90 tablet, Rfl: 3  •  aspirin 81 MG EC tablet, Take 81 mg by mouth Every Other Day., Disp: , Rfl:   •  ferrous sulfate 325 (65 FE) MG tablet, Take 325 mg by mouth Every Other Day., Disp: , Rfl:   •  fluticasone (Flonase) 50 MCG/ACT nasal spray, 2 sprays into the nostril(s) as directed by provider Daily., Disp: 18.2 mL, Rfl: 5  •  hydrocortisone (ANUSOL-HC) 25 MG suppository, Insert 1 suppository into the rectum 2 (Two) Times a Day As Needed for Hemorrhoids., Disp: 24 suppository, Rfl: " 3  •  losartan (COZAAR) 100 MG tablet, 1/2 tablet in the morning and 1/2 tablet at night, Disp: 90 tablet, Rfl: 3  •  metoprolol tartrate (LOPRESSOR) 25 MG tablet, Take 2 tablets by mouth 2 (Two) Times a Day., Disp: 180 tablet, Rfl: 3  •  Pediatric Multivitamins-Iron (CHILDRENS MULTIVITAMIN/IRON PO), Take  by mouth Daily., Disp: , Rfl:   •  triamcinolone (KENALOG) 0.1 % cream, Apply  topically to the appropriate area as directed As Needed., Disp: , Rfl:   •  vitamin D3 (Vitamin D) 125 MCG (5000 UT) capsule capsule, Take 5,000 Units by mouth Daily., Disp: , Rfl:     Patient Active Problem List   Diagnosis   • Aortic dissection, thoracic (HCC)   • H/O aortic arch repair   • History of aortic root repair   • CKD (chronic kidney disease)   • Essential hypertension   • Sjogren's syndrome (HCC)   • Chronic renal insufficiency, stage III (moderate) (HCC)   • Uterine leiomyoma   • Dysmenorrhea   • Pelvic pain   • Nonrheumatic aortic valve disorder   • Intramural leiomyoma of uterus   • Abnormal uterine bleeding (AUB)   • Menorrhagia with regular cycle   • Status post abdominal hysterectomy     Past Surgical History:   Procedure Laterality Date   • ASCENDING ARCH/HEMIARCH REPLACEMENT     • CARDIAC VALVE SURGERY     • HAND SURGERY Left     secondary to trauma   • TOTAL ABDOMINAL HYSTERECTOMY Bilateral 2021    Procedure: TOTAL ABDOMINAL HYSTERECTOMY, bilateral salpingectomy, cystoscopy;  Surgeon: Bo Mccallum DO;  Location: [facility];  Service: Obstetrics/Gynecology;  Laterality: Bilateral;   • TUBAL ABDOMINAL LIGATION       Social History     Socioeconomic History   • Marital status: Single   Tobacco Use   • Smoking status: Former Smoker     Packs/day: 1.00     Years: 24.00     Pack years: 24.00     Types: Cigarettes     Quit date: 2021     Years since quittin.7   • Smokeless tobacco: Never Used   Vaping Use   • Vaping Use: Former   • Substances: Flavoring   Substance and Sexual Activity   • Alcohol  use: Yes     Comment: social about 1 time monthly   • Drug use: No   • Sexual activity: Defer     Family History   Problem Relation Age of Onset   • Sudden death Father         cardiac   • Other Father         ruptured aortic aneurysm   • Aortic dissection Father    • Diabetes Other    • Hypertension Other    • Diabetes Mother    • Hypertension Mother    • COPD Mother    • Asthma Brother    • Hypertension Sister      No visits with results within 6 Month(s) from this visit.   Latest known visit with results is:   Office Visit on 06/21/2021   Component Date Value Ref Range Status   • Glucose 06/21/2021 65  65 - 99 mg/dL Final   • BUN 06/21/2021 19  6 - 20 mg/dL Final   • Creatinine 06/21/2021 1.74 (A) 0.57 - 1.00 mg/dL Final   • Sodium 06/21/2021 137  136 - 145 mmol/L Final   • Potassium 06/21/2021 4.1  3.5 - 5.2 mmol/L Final   • Chloride 06/21/2021 106  98 - 107 mmol/L Final   • CO2 06/21/2021 23.4  22.0 - 29.0 mmol/L Final   • Calcium 06/21/2021 9.4  8.6 - 10.5 mg/dL Final   • Total Protein 06/21/2021 7.6  6.0 - 8.5 g/dL Final   • Albumin 06/21/2021 4.10  3.50 - 5.20 g/dL Final   • ALT (SGPT) 06/21/2021 14  1 - 33 U/L Final   • AST (SGOT) 06/21/2021 13  1 - 32 U/L Final   • Alkaline Phosphatase 06/21/2021 46  39 - 117 U/L Final   • Total Bilirubin 06/21/2021 <0.2  0.0 - 1.2 mg/dL Final   • eGFR   Amer 06/21/2021 39 (A) >60 mL/min/1.73 Final   • Globulin 06/21/2021 3.5  gm/dL Final   • A/G Ratio 06/21/2021 1.2  g/dL Final   • BUN/Creatinine Ratio 06/21/2021 10.9  7.0 - 25.0 Final   • Anion Gap 06/21/2021 7.6  5.0 - 15.0 mmol/L Final   • Ferritin 06/21/2021 41.90  13.00 - 150.00 ng/mL Final   • Iron 06/21/2021 33 (A) 37 - 145 mcg/dL Final   • Iron Saturation 06/21/2021 9 (A) 20 - 50 % Final   • Transferrin 06/21/2021 234  200 - 360 mg/dL Final   • TIBC 06/21/2021 349  298 - 536 mcg/dL Final   • 25 Hydroxy, Vitamin D 06/21/2021 46.7  ng/ml Final   • Hepatitis C Ab 06/21/2021 <0.1  0.0 - 0.9 s/co ratio Final   •  Total Cholesterol 06/21/2021 183  0 - 200 mg/dL Final   • Triglycerides 06/21/2021 77  0 - 150 mg/dL Final   • HDL Cholesterol 06/21/2021 53  40 - 60 mg/dL Final   • LDL Cholesterol  06/21/2021 116 (A) 0 - 100 mg/dL Final   • VLDL Cholesterol 06/21/2021 14  5 - 40 mg/dL Final   • LDL/HDL Ratio 06/21/2021 2.16   Final   • WBC 06/21/2021 5.85  3.40 - 10.80 10*3/mm3 Final   • RBC 06/21/2021 3.83  3.77 - 5.28 10*6/mm3 Final   • Hemoglobin 06/21/2021 11.5 (A) 12.0 - 15.9 g/dL Final   • Hematocrit 06/21/2021 35.3  34.0 - 46.6 % Final   • MCV 06/21/2021 92.2  79.0 - 97.0 fL Final   • MCH 06/21/2021 30.0  26.6 - 33.0 pg Final   • MCHC 06/21/2021 32.6  31.5 - 35.7 g/dL Final   • RDW 06/21/2021 13.9  12.3 - 15.4 % Final   • RDW-SD 06/21/2021 46.5  37.0 - 54.0 fl Final   • MPV 06/21/2021 11.1  6.0 - 12.0 fL Final   • Platelets 06/21/2021 278  140 - 450 10*3/mm3 Final   • Neutrophil % 06/21/2021 70.6  42.7 - 76.0 % Final   • Lymphocyte % 06/21/2021 18.6 (A) 19.6 - 45.3 % Final   • Monocyte % 06/21/2021 7.5  5.0 - 12.0 % Final   • Eosinophil % 06/21/2021 2.7  0.3 - 6.2 % Final   • Basophil % 06/21/2021 0.3  0.0 - 1.5 % Final   • Immature Grans % 06/21/2021 0.3  0.0 - 0.5 % Final   • Neutrophils, Absolute 06/21/2021 4.12  1.70 - 7.00 10*3/mm3 Final   • Lymphocytes, Absolute 06/21/2021 1.09  0.70 - 3.10 10*3/mm3 Final   • Monocytes, Absolute 06/21/2021 0.44  0.10 - 0.90 10*3/mm3 Final   • Eosinophils, Absolute 06/21/2021 0.16  0.00 - 0.40 10*3/mm3 Final   • Basophils, Absolute 06/21/2021 0.02  0.00 - 0.20 10*3/mm3 Final   • Immature Grans, Absolute 06/21/2021 0.02  0.00 - 0.05 10*3/mm3 Final   • nRBC 06/21/2021 0.0  0.0 - 0.2 /100 WBC Final   • Interpretation 06/21/2021 Comment   Final    Negative  Not infected with HCV, unless recent infection is suspected or other  evidence exists to indicate HCV infection.      Adult Transthoracic Echo Complete w/ Color, Spectral and Contrast if Necessary Per Protocol  · Left ventricular wall  thickness is consistent with mild concentric   hypertrophy.  · Estimated left ventricular EF = 63% Left ventricular ejection fraction   appears to be 61 - 65%. Left ventricular systolic function is normal.  · Left ventricular diastolic function is consistent with (grade I)   impaired relaxation.  · The right ventricular cavity is mildly dilated.  · Estimated right ventricular systolic pressure from tricuspid   regurgitation is normal (<35 mmHg).  · Mild dilation of the aortic root is present (3.8 cms), Mild dilation of   the proximal aorta is present (4.1 cms)         [unfilled]    There is no immunization history on file for this patient.  The following portions of the patient's history were reviewed and updated as appropriate: allergies, current medications, past family history, past medical history, past social history, past surgical history and problem list.    PHQ-9 Total Score: 0           Physical Exam  Constitutional:       Appearance: Normal appearance. She is obese.   HENT:      Head: Normocephalic and atraumatic.      Right Ear: External ear normal.      Left Ear: External ear normal.   Eyes:      General:         Right eye: No discharge.         Left eye: No discharge.      Conjunctiva/sclera: Conjunctivae normal.   Cardiovascular:      Rate and Rhythm: Normal rate and regular rhythm.      Pulses: Normal pulses.      Heart sounds: Normal heart sounds. No murmur heard.  Pulmonary:      Effort: Pulmonary effort is normal. No respiratory distress.      Breath sounds: Normal breath sounds.   Abdominal:      General: There is no distension.      Palpations: Abdomen is soft.      Tenderness: There is no abdominal tenderness.   Musculoskeletal:      Cervical back: Normal range of motion.      Right lower leg: No edema.      Left lower leg: No edema.   Lymphadenopathy:      Cervical: No cervical adenopathy.   Neurological:      Mental Status: She is alert. Mental status is at baseline.   Psychiatric:          Mood and Affect: Mood normal.         Behavior: Behavior normal.         Assessment/Plan    Diagnosis Plan   1. Essential hypertension     2. Hemorrhoids, unspecified hemorrhoid type  hydrocortisone (ANUSOL-HC) 25 MG suppository   3. Hyperlipidemia, unspecified hyperlipidemia type  Lipid Panel   4. Vitamin D deficiency  Vitamin D 25 Hydroxy   5. Iron deficiency anemia, unspecified iron deficiency anemia type  Ferritin    Iron Profile      Orders Placed This Encounter   Procedures   • Lipid Panel   • Vitamin D 25 Hydroxy     Order Specific Question:   Release to patient     Answer:   Immediate   • Ferritin     Order Specific Question:   Release to patient     Answer:   Immediate   • Iron Profile     Hypertension: Well-controlled current medications, will continue.    Chronic medical issues as above, labs above, will adjust medications as needed, follow-up in 6 months, sooner if needed.        This document has been electronically signed by Marco A Stanley MD on March 21, 2022 12:56 CDT    EMR Dragon/Transciption Disclaimer: Some of this note may be an electronic transcription/translation of spoken language to printed text.  The electronic translation of spoken language may permit erroneous, or at times, nonsensical words or phrases to be inadvertently transcribed. Although I have reviewed the note for such errors, some may still exist.

## 2022-03-22 LAB
CENTROMERE B AB SER-ACNC: <0.2 AI (ref 0–0.9)
CHROMATIN AB SERPL-ACNC: <0.2 AI (ref 0–0.9)
DSDNA AB SER-ACNC: 2 IU/ML (ref 0–9)
ENA JO1 AB SER-ACNC: <0.2 AI (ref 0–0.9)
ENA RNP AB SER-ACNC: 0.3 AI (ref 0–0.9)
ENA SCL70 AB SER-ACNC: <0.2 AI (ref 0–0.9)
ENA SM AB SER-ACNC: <0.2 AI (ref 0–0.9)
ENA SS-A AB SER-ACNC: >8 AI (ref 0–0.9)
ENA SS-B AB SER-ACNC: <0.2 AI (ref 0–0.9)
Lab: ABNORMAL
SMA IGG SER-ACNC: 12 UNITS (ref 0–19)

## 2022-04-14 ENCOUNTER — OFFICE VISIT (OUTPATIENT)
Dept: CARDIOLOGY | Facility: CLINIC | Age: 42
End: 2022-04-14

## 2022-04-14 VITALS
HEIGHT: 72 IN | DIASTOLIC BLOOD PRESSURE: 76 MMHG | BODY MASS INDEX: 36.03 KG/M2 | WEIGHT: 266 LBS | HEART RATE: 61 BPM | TEMPERATURE: 97.1 F | SYSTOLIC BLOOD PRESSURE: 130 MMHG | OXYGEN SATURATION: 99 %

## 2022-04-14 DIAGNOSIS — N18.9 CHRONIC KIDNEY DISEASE, UNSPECIFIED CKD STAGE: ICD-10-CM

## 2022-04-14 DIAGNOSIS — I71.019 AORTIC DISSECTION, THORACIC: Primary | ICD-10-CM

## 2022-04-14 DIAGNOSIS — I10 ESSENTIAL HYPERTENSION: ICD-10-CM

## 2022-04-14 DIAGNOSIS — Z98.890 H/O AORTIC ARCH REPAIR: ICD-10-CM

## 2022-04-14 DIAGNOSIS — I35.9 NONRHEUMATIC AORTIC VALVE DISORDER: ICD-10-CM

## 2022-04-14 PROCEDURE — 99214 OFFICE O/P EST MOD 30 MIN: CPT | Performed by: INTERNAL MEDICINE

## 2022-04-14 NOTE — PROGRESS NOTES
Marta Chambers  41 y.o. female      1. Aortic dissection, thoracic (HCC)    2. H/O aortic arch repair    3. Nonrheumatic aortic valve disorder    4. Essential hypertension    5. Chronic kidney disease, unspecified CKD stage        History of Present Illness   Ms. Marta Chambers is a 41 y.o. female with a history of obesity and hypertension who presented with [place] type A dissection in October 2014 and underwent repair by  in [place], [place].  PROCEDURES PERFORMED:   1. Urgent repair of a type A aortic dissection using a #36 Vascutek  interposition graft to reconstruct the aortic arch as a hemiarch  reconstruction, and a total replacement of the ascending aorta.   2. Resuspension and reconstruction of the aortic root and valve using BioGlue.    Her other medical issues include CKD which is being followed closely by nephrology, obesity and history of fibroids.    She denied any chest pain, shortness of breath or palpitation and has been compliant with her medications.  Her blood pressure has been in the normal range.    Echocardiogram in April 2021 showed normal LV systolic function with an EF of 63% and mild LVH.  There is mild to moderate aortic valve insufficiency.  Grade 1 diastolic dysfunction present.  RVSP was less than 35 mmHg.  Aortic root was mildly dilated at 3.8 cm and proximal aorta at 4.1 cm.      No Known Allergies      Past Medical History:   Diagnosis Date   • Acute renal failure (HCC)    • Anemia    • Aneurysm, aortic (HCC)    • Aortic dissection (HCC) 2014   • Arthritis    • Chest pain    • GERD (gastroesophageal reflux disease)    • Hemorrhoids    • Hypertension    • Leaky heart valve    • Obesity    • Sjogren's disease (HCC)    • Stage 3 chronic kidney disease (HCC)    • Uterine leiomyoma 08/12/2020         Past Surgical History:   Procedure Laterality Date   • ASCENDING ARCH/HEMIARCH REPLACEMENT     • CARDIAC VALVE SURGERY     • HAND SURGERY Left     secondary to trauma   • TOTAL  ABDOMINAL HYSTERECTOMY Bilateral 2021    Procedure: TOTAL ABDOMINAL HYSTERECTOMY, bilateral salpingectomy, cystoscopy;  Surgeon: Bo Mccallum DO;  Location: [facility];  Service: Obstetrics/Gynecology;  Laterality: Bilateral;   • TUBAL ABDOMINAL LIGATION           Family History   Problem Relation Age of Onset   • Sudden death Father         cardiac   • Other Father         ruptured aortic aneurysm   • Aortic dissection Father    • Diabetes Other    • Hypertension Other    • Diabetes Mother    • Hypertension Mother    • COPD Mother    • Asthma Brother    • Hypertension Sister          Social History     Socioeconomic History   • Marital status: Single   Tobacco Use   • Smoking status: Former Smoker     Packs/day: 1.00     Years: 24.00     Pack years: 24.00     Types: Cigarettes     Quit date: 2021     Years since quittin.8   • Smokeless tobacco: Never Used   Vaping Use   • Vaping Use: Former   • Substances: Flavoring   Substance and Sexual Activity   • Alcohol use: Yes     Comment: social about 1 time monthly   • Drug use: No   • Sexual activity: Defer         Current Outpatient Medications   Medication Sig Dispense Refill   • acetaminophen (TYLENOL) 500 MG tablet Take 2 tablets by mouth Every 8 (Eight) Hours. 60 tablet 2   • amLODIPine (NORVASC) 10 MG tablet Take 1 tablet by mouth Daily. 90 tablet 3   • aspirin 81 MG EC tablet Take 81 mg by mouth Every Other Day.     • ferrous sulfate 325 (65 FE) MG tablet Take 325 mg by mouth Every Other Day.     • fluticasone (Flonase) 50 MCG/ACT nasal spray 2 sprays into the nostril(s) as directed by provider Daily. 18.2 mL 5   • hydrocortisone (ANUSOL-HC) 25 MG suppository Insert 1 suppository into the rectum 2 (Two) Times a Day As Needed for Hemorrhoids. 24 suppository 3   • losartan (COZAAR) 100 MG tablet 1/2 tablet in the morning and 1/2 tablet at night 90 tablet 3   • metoprolol tartrate (LOPRESSOR) 25 MG tablet Take 2 tablets by mouth 2 (Two) Times a  "Day. 180 tablet 3   • Pediatric Multivitamins-Iron (CHILDRENS MULTIVITAMIN/IRON PO) Take  by mouth Daily.     • triamcinolone (KENALOG) 0.1 % cream Apply  topically to the appropriate area as directed As Needed.     • vitamin D3 125 MCG (5000 UT) capsule capsule Take 5,000 Units by mouth Daily.       No current facility-administered medications for this visit.         OBJECTIVE    /76 (BP Location: Left arm, Patient Position: Sitting, Cuff Size: Adult)   Pulse 61   Temp 97.1 °F (36.2 °C)   Ht 182.9 cm (72\")   Wt 121 kg (266 lb)   LMP 04/25/2021   SpO2 99%   BMI 36.08 kg/m²         Review of Systems : The following systems were reviewed and no changes were noted    Constitutional:  Denies recent weight loss, weight gain, fever or chills, no change in exercise tolerance     HENT:  Denies any hearing loss, epistaxis, hoarseness, or difficulty speaking.     Eyes: Wears eyeglasses or contact lenses     Respiratory:  Denies dyspnea with exertion,no cough, wheezing, or hemoptysis.     Cardiovascular: Negative for palpations, chest pain    Gastrointestinal:  Denies change in bowel habits, dyspepsia, ulcer disease, hematochezia, or melena.     Endocrine: Negative for cold intolerance, heat intolerance, polydipsia, polyphagia and polyuria.     Genitourinary: CKD, fibroid uterus      Musculoskeletal: Denies any history of arthritic symptoms or back problems     Neurological:  Denies any history of recurrent headaches, strokes, TIA, or seizure disorder.     Hematological: Denies any food allergies, seasonal allergies, bleeding disorders, or lymphadenopathy.     Psychiatric/Behavioral: Denies any history of depression, substance abuse, or change in cognitive function.         Physical Exam : The following systems were reassessed and no changes noted    Constitutional: Cooperative, alert and oriented,  in no acute distress.     HENT:   Head: Normocephalic, normal hair patterns, no masses or tenderness.  Ears, Nose, " and Throat: No gross abnormalities. No pallor or cyanosis. Dentition good.   Eyes: EOMS intact, PERRL, conjunctivae and lids unremarkable. Fundoscopic exam and visual fields not performed.   Neck: No palpable masses or adenopathy, no thyromegaly, no JVD, carotid pulses are full and equal bilaterally and without  Bruits.     Cardiovascular: Regular rhythm, S1 and S2 normal, no S3 or S4. 2/6 systolic murmur, no gallops, or rubs detected.     Pulmonary/Chest: Chest: normal symmetry,  normal respiratory excursion, no intercostal retraction, no use of accessory muscles.            Pulmonary: Normal breath sounds. No rales or ronchi.    Abdominal: Abdomen soft, bowel sounds normoactive, no masses, no hepatosplenomegaly, non-tender, no bruits.     Musculoskeletal: No deformities, clubbing, cyanosis, erythema, or edema observed.     Neurological: No gross motor or sensory deficits noted, affect appropriate, oriented to time, person, place.     Skin: Warm and dry to the touch, no apparent skin lesions or masses noted.     Psychiatric: She has a normal mood and affect. Her behavior is normal. Judgment and thought content normal.         Procedures      Lab Results   Component Value Date    WBC 5.36 03/21/2022    HGB 12.9 03/21/2022    HCT 40.6 03/21/2022    MCV 94.4 03/21/2022     03/21/2022     Lab Results   Component Value Date    GLUCOSE 65 06/21/2021    BUN 19 06/21/2021    CREATININE 2.01 (H) 03/21/2022    EGFRIFAFRI 39 (L) 06/21/2021    BCR 10.9 06/21/2021    CO2 23.4 06/21/2021    CALCIUM 9.4 06/21/2021    ALBUMIN 4.10 06/21/2021    AST 13 06/21/2021    ALT 14 06/21/2021     Lab Results   Component Value Date    CHOL 187 03/21/2022    CHOL 183 06/21/2021    CHOL 157 06/29/2018     Lab Results   Component Value Date    TRIG 68 03/21/2022    TRIG 77 06/21/2021    TRIG 84 06/29/2018     Lab Results   Component Value Date    HDL 52 03/21/2022    HDL 53 06/21/2021    HDL 48 (L) 06/29/2018     No components found for:  LDLCALC  Lab Results   Component Value Date     (H) 03/21/2022     (H) 06/21/2021    LDL 80 06/29/2018     No results found for: HDLLDLRATIO  No components found for: CHOLHDL  Lab Results   Component Value Date    HGBA1C 5.3 10/23/2014     Lab Results   Component Value Date    TSH 1.450 06/18/2019           ASSESSMENT AND PLAN  Ms. Chambers is initially stable with no cardiac symptoms at the present time.  Her blood pressure is in the normal range.   She does have CKD with a creatinine of 2.01 and a GFR of 39.  She does follow-up with nephrology on a regular basis.    I have continued antihypertensive therapy with amlodipine, losartan, metoprolol tartrate and antiplatelet therapy with aspirin.  She has been advised to watch her diet closely.  An echocardiogram to reassess aorta, aortic valve is being arranged.    Diagnoses and all orders for this visit:    1. Aortic dissection, thoracic (HCC) (Primary)  -     Adult Transthoracic Echo Complete w/ Color, Spectral and Contrast if Necessary Per Protocol; Future    2. H/O aortic arch repair  -     Adult Transthoracic Echo Complete w/ Color, Spectral and Contrast if Necessary Per Protocol; Future    3. Nonrheumatic aortic valve disorder  -     Adult Transthoracic Echo Complete w/ Color, Spectral and Contrast if Necessary Per Protocol; Future    4. Essential hypertension  -     Adult Transthoracic Echo Complete w/ Color, Spectral and Contrast if Necessary Per Protocol; Future    5. Chronic kidney disease, unspecified CKD stage        Patient's Body mass index is 36.08 kg/m². BMI is above normal parameters. Recommendations include: exercise counseling and nutrition counseling.  Patient is a non-smoker.    Katerina Solis MD  4/14/2022  11:54 CDT

## 2022-05-03 RX ORDER — FERROUS SULFATE 325(65) MG
325 TABLET ORAL EVERY OTHER DAY
Qty: 30 TABLET | Refills: 3 | Status: SHIPPED | OUTPATIENT
Start: 2022-05-03

## 2022-05-03 NOTE — TELEPHONE ENCOUNTER
Rx Refill Note  Requested Prescriptions     Pending Prescriptions Disp Refills   • ferrous sulfate 325 (65 FE) MG tablet 30 tablet 3     Sig: Take 1 tablet by mouth Every Other Day.      Last office visit with prescribing clinician: 3/21/2022      Next office visit with prescribing clinician: 9/22/2022            Shine Lopez Rep  05/03/22, 11:37 CDT

## 2022-05-13 ENCOUNTER — TELEPHONE (OUTPATIENT)
Dept: CARDIOLOGY | Facility: CLINIC | Age: 42
End: 2022-05-13

## 2022-05-13 NOTE — TELEPHONE ENCOUNTER
Echocardiogram showed overall normal LV function.  No significant valve abnormalities.  Aorta size unchanged from before.   Per Dr Winters  Patient notified and understood

## 2022-09-25 DIAGNOSIS — I10 ESSENTIAL HYPERTENSION: ICD-10-CM

## 2022-09-26 RX ORDER — LOSARTAN POTASSIUM 100 MG/1
TABLET ORAL
Qty: 90 TABLET | Refills: 0 | Status: SHIPPED | OUTPATIENT
Start: 2022-09-26 | End: 2022-12-28

## 2022-09-26 NOTE — TELEPHONE ENCOUNTER
Rx Refill Note  Requested Prescriptions     Pending Prescriptions Disp Refills   • losartan (COZAAR) 100 MG tablet [Pharmacy Med Name: LOSARTAN POTASSIUM 100 MG TAB] 90 tablet 3     Sig: TAKE 1/2 TABLET BY MOUTH EVERY MORNING AND TAKE 1/2 TABLET BY MOUTH ONCE NIGHTLY      Last office visit with prescribing clinician: 3/21/2022      Next office visit with prescribing clinician: 10/18/2022   {TIP  Encounters:     ARB Protocol Failed 09/25/2022 05:33 AM   Protocol Details  Normal serum potassium on file within the past year            {TIP  Please add Last Relevant Lab Date if appropriate: 6/21/21-K+ WAS 4.1      {TIP  Is Refill Pharmacy correct? YES  Tommy Wilkes LPN  09/26/22, 15:35 CDT

## 2022-10-24 DIAGNOSIS — J34.89 NASAL CONGESTION WITH RHINORRHEA: ICD-10-CM

## 2022-10-24 DIAGNOSIS — R09.81 NASAL CONGESTION WITH RHINORRHEA: ICD-10-CM

## 2022-10-24 NOTE — TELEPHONE ENCOUNTER
Rx Refill Note  Requested Prescriptions     Pending Prescriptions Disp Refills   • fluticasone (FLONASE) 50 MCG/ACT nasal spray [Pharmacy Med Name: FLUTICASONE PROP 50 MCG SPRAY] 16 mL      Sig: SPRAY TWO SPRAYS IN EACH NOSTRIL ONCE DAILY      Last office visit with prescribing clinician: 3/21/2022      Next office visit with prescribing clinician: 11/11/2022            Giovana Knowles MA  10/24/22, 08:36 CDT

## 2022-10-26 RX ORDER — FLUTICASONE PROPIONATE 50 MCG
SPRAY, SUSPENSION (ML) NASAL
Qty: 16 ML | Refills: 3 | Status: SHIPPED | OUTPATIENT
Start: 2022-10-26

## 2022-10-27 ENCOUNTER — OFFICE VISIT (OUTPATIENT)
Dept: CARDIOLOGY | Facility: CLINIC | Age: 42
End: 2022-10-27

## 2022-10-27 VITALS
WEIGHT: 266 LBS | SYSTOLIC BLOOD PRESSURE: 138 MMHG | OXYGEN SATURATION: 98 % | TEMPERATURE: 96.8 F | BODY MASS INDEX: 36.03 KG/M2 | DIASTOLIC BLOOD PRESSURE: 78 MMHG | HEIGHT: 72 IN | HEART RATE: 62 BPM

## 2022-10-27 DIAGNOSIS — I71.010 DISSECTION OF ASCENDING AORTA: ICD-10-CM

## 2022-10-27 DIAGNOSIS — I10 ESSENTIAL HYPERTENSION: Primary | ICD-10-CM

## 2022-10-27 DIAGNOSIS — Z98.890 H/O AORTIC ARCH REPAIR: ICD-10-CM

## 2022-10-27 DIAGNOSIS — N18.32 CHRONIC RENAL IMPAIRMENT, STAGE 3B: ICD-10-CM

## 2022-10-27 DIAGNOSIS — I35.9 NONRHEUMATIC AORTIC VALVE DISORDER: ICD-10-CM

## 2022-10-27 PROCEDURE — 99214 OFFICE O/P EST MOD 30 MIN: CPT | Performed by: INTERNAL MEDICINE

## 2022-10-27 PROCEDURE — 93000 ELECTROCARDIOGRAM COMPLETE: CPT | Performed by: INTERNAL MEDICINE

## 2022-10-27 RX ORDER — FINERENONE 20 MG/1
TABLET, FILM COATED ORAL
COMMUNITY
Start: 2022-10-10

## 2022-10-27 RX ORDER — HYDROXYCHLOROQUINE SULFATE 200 MG/1
TABLET, FILM COATED ORAL
COMMUNITY
Start: 2022-10-19

## 2022-10-27 NOTE — PROGRESS NOTES
Marta Chambers  41 y.o. female      1. Essential hypertension    2. Dissection of ascending aorta    3. H/O aortic arch repair    4. Nonrheumatic aortic valve disorder    5. Chronic renal impairment, stage 3b (HCC)        History of Present Illness   Ms. Marta Chambers is a 41 y.o. female with a history of obesity and hypertension who presented with [place] type A dissection in October 2014 and underwent repair by  in [place], [place].    PROCEDURES PERFORMED:   1. Urgent repair of a type A aortic dissection using a #36 Vascutek  interposition graft to reconstruct the aortic arch as a hemiarch  reconstruction, and a total replacement of the ascending aorta.   2. Resuspension and reconstruction of the aortic root and valve using BioGlue.    Her other medical issues include CKD which is being followed closely by nephrology, obesity and history of fibroids.    Echocardiogram in May 2022 showed:  • Calculated left ventricular 3D EF = 63% Estimated left ventricular EF = 64% Left ventricular ejection fraction appears to be 61 - 65%. Left ventricular systolic function is normal.  • Left ventricular wall thickness is consistent with mild concentric hypertrophy.  • The left atrial cavity is mildly dilated.  • Mild aortic valve regurgitation is present.  • Estimated right ventricular systolic pressure from tricuspid regurgitation is normal (<35 mmHg).          Aortic sinus measures 3.9 cm.    The patient was progressed well and denied any cardiac symptoms with no chest pain, shortness of breath or palpitation.  She has had some degree of proteinuria and was started on Kerendia.    EKG today showed sinus rhythm with heart rate of 62 bpm.  Poor R wave progression anteroseptal leads.  Minimal nonspecific T wave changes.    No Known Allergies      Past Medical History:   Diagnosis Date   • Acute renal failure (HCC)    • Anemia    • Aneurysm, aortic (HCC)    • Aortic dissection (HCC) 2014   • Arthritis    • Chest pain    •  GERD (gastroesophageal reflux disease)    • Hemorrhoids    • Hypertension    • Leaky heart valve    • Obesity    • Sjogren's disease (HCC)    • Stage 3 chronic kidney disease (HCC)    • Uterine leiomyoma 2020         Past Surgical History:   Procedure Laterality Date   • ASCENDING ARCH/HEMIARCH REPLACEMENT     • CARDIAC VALVE SURGERY     • HAND SURGERY Left     secondary to trauma   • TOTAL ABDOMINAL HYSTERECTOMY Bilateral 2021    Procedure: TOTAL ABDOMINAL HYSTERECTOMY, bilateral salpingectomy, cystoscopy;  Surgeon: Bo Mccallum DO;  Location: [facility];  Service: Obstetrics/Gynecology;  Laterality: Bilateral;   • TUBAL ABDOMINAL LIGATION           Family History   Problem Relation Age of Onset   • Sudden death Father         cardiac   • Other Father         ruptured aortic aneurysm   • Aortic dissection Father    • Diabetes Other    • Hypertension Other    • Diabetes Mother    • Hypertension Mother    • COPD Mother    • Asthma Brother    • Hypertension Sister          Social History     Socioeconomic History   • Marital status: Single   Tobacco Use   • Smoking status: Former     Packs/day: 1.00     Years: 24.00     Pack years: 24.00     Types: Cigarettes     Quit date: 2021     Years since quittin.3   • Smokeless tobacco: Never   Vaping Use   • Vaping Use: Former   • Substances: Flavoring   Substance and Sexual Activity   • Alcohol use: Yes     Comment: social about 1 time monthly   • Drug use: No   • Sexual activity: Defer         Current Outpatient Medications   Medication Sig Dispense Refill   • acetaminophen (TYLENOL) 500 MG tablet Take 2 tablets by mouth Every 8 (Eight) Hours. 60 tablet 2   • amLODIPine (NORVASC) 10 MG tablet Take 1 tablet by mouth Daily. 90 tablet 3   • aspirin 81 MG EC tablet Take 81 mg by mouth Every Other Day.     • ferrous sulfate 325 (65 FE) MG tablet Take 1 tablet by mouth Every Other Day. 30 tablet 3   • fluticasone (FLONASE) 50 MCG/ACT nasal spray SPRAY  "TWO SPRAYS IN EACH NOSTRIL ONCE DAILY 16 mL 3   • hydrocortisone (ANUSOL-HC) 25 MG suppository Insert 1 suppository into the rectum 2 (Two) Times a Day As Needed for Hemorrhoids. 24 suppository 3   • hydroxychloroquine (PLAQUENIL) 200 MG tablet      • Kerendia 20 MG tablet      • losartan (COZAAR) 100 MG tablet TAKE 1/2 TABLET BY MOUTH EVERY MORNING AND TAKE 1/2 TABLET BY MOUTH ONCE NIGHTLY 90 tablet 0   • metoprolol tartrate (LOPRESSOR) 25 MG tablet Take 2 tablets by mouth 2 (Two) Times a Day. 180 tablet 3   • Pediatric Multivitamins-Iron (CHILDRENS MULTIVITAMIN/IRON PO) Take  by mouth Daily.     • triamcinolone (KENALOG) 0.1 % cream Apply  topically to the appropriate area as directed As Needed.     • vitamin D3 125 MCG (5000 UT) capsule capsule Take 5,000 Units by mouth Daily.       No current facility-administered medications for this visit.         OBJECTIVE    /78 (BP Location: Left arm, Patient Position: Sitting, Cuff Size: Adult)   Pulse 62   Temp 96.8 °F (36 °C)   Ht 182.9 cm (72\")   Wt 121 kg (266 lb)   LMP 04/25/2021   SpO2 98%   BMI 36.08 kg/m²         Review of Systems : The following systems were reviewed and no changes were noted    Constitutional:  Denies recent weight loss, weight gain, fever or chills, no change in exercise tolerance     HENT:  Denies any hearing loss, epistaxis, hoarseness, or difficulty speaking.     Eyes: Wears eyeglasses or contact lenses     Respiratory:  Denies dyspnea with exertion,no cough, wheezing, or hemoptysis.     Cardiovascular: Negative for palpations, chest pain    Gastrointestinal:  Denies change in bowel habits, dyspepsia, ulcer disease, hematochezia, or melena.     Endocrine: Negative for cold intolerance, heat intolerance, polydipsia, polyphagia and polyuria.     Genitourinary: CKD, fibroid uterus      Musculoskeletal: Denies any history of arthritic symptoms or back problems     Neurological:  Denies any history of recurrent headaches, strokes, " TIA, or seizure disorder.     Hematological: Denies any food allergies, seasonal allergies, bleeding disorders, or lymphadenopathy.     Psychiatric/Behavioral: Denies any history of depression, substance abuse, or change in cognitive function.         Physical Exam : The following systems were reassessed and no changes noted    Constitutional: Cooperative, alert and oriented,  in no acute distress.     HENT:   Head: Normocephalic, normal hair patterns, no masses or tenderness.  Ears, Nose, and Throat: No gross abnormalities. No pallor or cyanosis. Dentition good.   Eyes: EOMS intact, PERRL, conjunctivae and lids unremarkable. Fundoscopic exam and visual fields not performed.   Neck: No palpable masses or adenopathy, no thyromegaly, no JVD, carotid pulses are full and equal bilaterally and without  Bruits.     Cardiovascular: Regular rhythm, S1 and S2 normal, no S3 or S4. 2/6 systolic murmur, no gallops, or rubs detected.     Pulmonary/Chest: Chest: normal symmetry,  normal respiratory excursion, no intercostal retraction, no use of accessory muscles.            Pulmonary: Normal breath sounds. No rales or ronchi.    Abdominal: Abdomen soft, bowel sounds normoactive, no masses, no hepatosplenomegaly, non-tender, no bruits.     Musculoskeletal: No deformities, clubbing, cyanosis, erythema, or edema observed.     Neurological: No gross motor or sensory deficits noted, affect appropriate, oriented to time, person, place.     Skin: Warm and dry to the touch, no apparent skin lesions or masses noted.     Psychiatric: She has a normal mood and affect. Her behavior is normal. Judgment and thought content normal.         Procedures      Lab Results   Component Value Date    WBC 5.36 03/21/2022    HGB 12.9 03/21/2022    HCT 40.6 03/21/2022    MCV 94.4 03/21/2022     03/21/2022     Lab Results   Component Value Date    GLUCOSE 65 06/21/2021    BUN 19 06/21/2021    CREATININE 2.01 (H) 03/21/2022    EGFRIFAFRI 39 (L)  06/21/2021    BCR 10.9 06/21/2021    CO2 23.4 06/21/2021    CALCIUM 9.4 06/21/2021    ALBUMIN 4.10 06/21/2021    AST 13 06/21/2021    ALT 14 06/21/2021     Lab Results   Component Value Date    CHOL 187 03/21/2022    CHOL 183 06/21/2021    CHOL 157 06/29/2018     Lab Results   Component Value Date    TRIG 68 03/21/2022    TRIG 77 06/21/2021    TRIG 84 06/29/2018     Lab Results   Component Value Date    HDL 52 03/21/2022    HDL 53 06/21/2021    HDL 48 (L) 06/29/2018     No components found for: LDLCALC  Lab Results   Component Value Date     (H) 03/21/2022     (H) 06/21/2021    LDL 80 06/29/2018     No results found for: HDLLDLRATIO  No components found for: CHOLHDL  Lab Results   Component Value Date    HGBA1C 5.3 10/23/2014     Lab Results   Component Value Date    TSH 1.450 06/18/2019           ASSESSMENT AND PLAN  Ms. Chambers is stable from a cardiac standpoint with no evidence of angina, arrhythmia or congestive heart failure.  Her blood pressure is in the normal range.  She has stage IIIb CKD and is being followed closely by nephrology.    I have continued antihypertensive therapy with amlodipine, losartan, metoprolol tartrate and antiplatelet therapy with aspirin.  She has been advised to watch her diet closely.     Diagnoses and all orders for this visit:    1. Essential hypertension (Primary)  -     ECG 12 Lead    2. Dissection of ascending aorta    3. H/O aortic arch repair    4. Nonrheumatic aortic valve disorder    5. Chronic renal impairment, stage 3b (HCC)        Patient's Body mass index is 36.08 kg/m². BMI is above normal parameters. Recommendations include: exercise counseling and nutrition counseling.  Patient is a non-smoker.    Katerina Solis MD  10/27/2022  09:43 CDT

## 2022-11-01 LAB
QT INTERVAL: 390 MS
QTC INTERVAL: 395 MS

## 2022-11-11 ENCOUNTER — TRANSCRIBE ORDERS (OUTPATIENT)
Dept: LAB | Facility: HOSPITAL | Age: 42
End: 2022-11-11

## 2022-11-11 ENCOUNTER — OFFICE VISIT (OUTPATIENT)
Dept: FAMILY MEDICINE CLINIC | Facility: CLINIC | Age: 42
End: 2022-11-11

## 2022-11-11 ENCOUNTER — LAB (OUTPATIENT)
Dept: LAB | Facility: HOSPITAL | Age: 42
End: 2022-11-11

## 2022-11-11 VITALS
BODY MASS INDEX: 36.03 KG/M2 | SYSTOLIC BLOOD PRESSURE: 124 MMHG | OXYGEN SATURATION: 99 % | HEIGHT: 72 IN | TEMPERATURE: 97.5 F | HEART RATE: 63 BPM | WEIGHT: 266 LBS | DIASTOLIC BLOOD PRESSURE: 76 MMHG

## 2022-11-11 DIAGNOSIS — D50.9 IRON DEFICIENCY ANEMIA, UNSPECIFIED IRON DEFICIENCY ANEMIA TYPE: Primary | ICD-10-CM

## 2022-11-11 DIAGNOSIS — M35.00 SICCA SYNDROME: ICD-10-CM

## 2022-11-11 DIAGNOSIS — I10 ESSENTIAL HYPERTENSION: ICD-10-CM

## 2022-11-11 DIAGNOSIS — M35.00 SICCA SYNDROME: Primary | ICD-10-CM

## 2022-11-11 PROCEDURE — 99213 OFFICE O/P EST LOW 20 MIN: CPT | Performed by: STUDENT IN AN ORGANIZED HEALTH CARE EDUCATION/TRAINING PROGRAM

## 2022-11-11 PROCEDURE — 85025 COMPLETE CBC W/AUTO DIFF WBC: CPT

## 2022-11-11 PROCEDURE — 86235 NUCLEAR ANTIGEN ANTIBODY: CPT

## 2022-11-11 PROCEDURE — 82570 ASSAY OF URINE CREATININE: CPT

## 2022-11-11 PROCEDURE — 84156 ASSAY OF PROTEIN URINE: CPT

## 2022-11-11 PROCEDURE — 86200 CCP ANTIBODY: CPT

## 2022-11-11 PROCEDURE — 80069 RENAL FUNCTION PANEL: CPT

## 2022-11-11 PROCEDURE — 83540 ASSAY OF IRON: CPT | Performed by: STUDENT IN AN ORGANIZED HEALTH CARE EDUCATION/TRAINING PROGRAM

## 2022-11-11 PROCEDURE — 86038 ANTINUCLEAR ANTIBODIES: CPT

## 2022-11-11 PROCEDURE — 84550 ASSAY OF BLOOD/URIC ACID: CPT

## 2022-11-11 PROCEDURE — 86140 C-REACTIVE PROTEIN: CPT

## 2022-11-11 PROCEDURE — 82728 ASSAY OF FERRITIN: CPT | Performed by: STUDENT IN AN ORGANIZED HEALTH CARE EDUCATION/TRAINING PROGRAM

## 2022-11-11 PROCEDURE — 86160 COMPLEMENT ANTIGEN: CPT

## 2022-11-11 PROCEDURE — 84466 ASSAY OF TRANSFERRIN: CPT | Performed by: STUDENT IN AN ORGANIZED HEALTH CARE EDUCATION/TRAINING PROGRAM

## 2022-11-11 PROCEDURE — 85652 RBC SED RATE AUTOMATED: CPT

## 2022-11-11 PROCEDURE — 86225 DNA ANTIBODY NATIVE: CPT

## 2022-11-11 NOTE — PROGRESS NOTES
"Subjective:  Marta Chambers is a 41 y.o. female who presents for     Hypertension; currently amlodipine 10 mg daily, losartan 100 mg daily, metoprolol titrate 25 mg twice daily.  States that blood pressure has been well controlled, denies any adverse effects medication, chest pain, orthopnea, dyspnea exertion, leg swelling, dizziness, falls.    Iron deficiency anemia; has been on iron supplementation every other day for years, denies any adverse effects of medication, constipation, abdominal pain.  Denied any signs of active bleeding, dark stools, hematuria, hemoptysis.  Denies any fatigue, palpitations, tachycardia.    Patient Active Problem List   Diagnosis   • Aortic dissection, thoracic   • H/O aortic arch repair   • History of aortic root repair   • CKD (chronic kidney disease)   • Essential hypertension   • Sjogren's syndrome (HCC)   • Chronic renal insufficiency, stage III (moderate) (HCC)   • Uterine leiomyoma   • Dysmenorrhea   • Pelvic pain   • Nonrheumatic aortic valve disorder   • Intramural leiomyoma of uterus   • Abnormal uterine bleeding (AUB)   • Menorrhagia with regular cycle   • Status post abdominal hysterectomy     Vitals:    Vitals:    11/11/22 1027   BP: 124/76   BP Location: Left arm   Patient Position: Sitting   Cuff Size: Large Adult   Pulse: 63   Temp: 97.5 °F (36.4 °C)   SpO2: 99%   Weight: 121 kg (266 lb)   Height: 182.9 cm (72\")     Body mass index is 36.08 kg/m².      Current Outpatient Medications:   •  acetaminophen (TYLENOL) 500 MG tablet, Take 2 tablets by mouth Every 8 (Eight) Hours., Disp: 60 tablet, Rfl: 2  •  amLODIPine (NORVASC) 10 MG tablet, Take 1 tablet by mouth Daily., Disp: 90 tablet, Rfl: 3  •  aspirin 81 MG EC tablet, Take 81 mg by mouth Every Other Day., Disp: , Rfl:   •  ferrous sulfate 325 (65 FE) MG tablet, Take 1 tablet by mouth Every Other Day., Disp: 30 tablet, Rfl: 3  •  fluticasone (FLONASE) 50 MCG/ACT nasal spray, SPRAY TWO SPRAYS IN EACH NOSTRIL ONCE " DAILY, Disp: 16 mL, Rfl: 3  •  hydrocortisone (ANUSOL-HC) 25 MG suppository, Insert 1 suppository into the rectum 2 (Two) Times a Day As Needed for Hemorrhoids., Disp: 24 suppository, Rfl: 3  •  Kerendia 20 MG tablet, , Disp: , Rfl:   •  losartan (COZAAR) 100 MG tablet, TAKE 1/2 TABLET BY MOUTH EVERY MORNING AND TAKE 1/2 TABLET BY MOUTH ONCE NIGHTLY, Disp: 90 tablet, Rfl: 0  •  metoprolol tartrate (LOPRESSOR) 25 MG tablet, Take 2 tablets by mouth 2 (Two) Times a Day., Disp: 180 tablet, Rfl: 3  •  Pediatric Multivitamins-Iron (CHILDRENS MULTIVITAMIN/IRON PO), Take  by mouth Daily., Disp: , Rfl:   •  triamcinolone (KENALOG) 0.1 % cream, Apply  topically to the appropriate area as directed As Needed., Disp: , Rfl:   •  vitamin D3 125 MCG (5000 UT) capsule capsule, Take 5,000 Units by mouth Daily., Disp: , Rfl:   •  hydroxychloroquine (PLAQUENIL) 200 MG tablet, , Disp: , Rfl:     Patient Active Problem List   Diagnosis   • Aortic dissection, thoracic   • H/O aortic arch repair   • History of aortic root repair   • CKD (chronic kidney disease)   • Essential hypertension   • Sjogren's syndrome (HCC)   • Chronic renal insufficiency, stage III (moderate) (HCC)   • Uterine leiomyoma   • Dysmenorrhea   • Pelvic pain   • Nonrheumatic aortic valve disorder   • Intramural leiomyoma of uterus   • Abnormal uterine bleeding (AUB)   • Menorrhagia with regular cycle   • Status post abdominal hysterectomy     Past Surgical History:   Procedure Laterality Date   • ASCENDING ARCH/HEMIARCH REPLACEMENT     • CARDIAC VALVE SURGERY     • HAND SURGERY Left     secondary to trauma   • TOTAL ABDOMINAL HYSTERECTOMY Bilateral 6/7/2021    Procedure: TOTAL ABDOMINAL HYSTERECTOMY, bilateral salpingectomy, cystoscopy;  Surgeon: Bo Mccallum DO;  Location: [facility];  Service: Obstetrics/Gynecology;  Laterality: Bilateral;   • TUBAL ABDOMINAL LIGATION       Social History     Socioeconomic History   • Marital status: Single   Tobacco Use    • Smoking status: Former     Packs/day: 0.25     Years: 17.00     Pack years: 4.25     Types: Cigarettes     Quit date: 2021     Years since quittin.3   • Smokeless tobacco: Never   Vaping Use   • Vaping Use: Former   • Substances: Flavoring   Substance and Sexual Activity   • Alcohol use: Yes     Alcohol/week: 3.0 standard drinks     Types: 3 Cans of beer per week     Comment: social about 1 time monthly   • Drug use: No   • Sexual activity: Yes     Partners: Male     Birth control/protection: None     Family History   Problem Relation Age of Onset   • Sudden death Father         cardiac   • Other Father         ruptured aortic aneurysm   • Aortic dissection Father    • Early death Father    • Diabetes Other    • Hypertension Other    • Diabetes Mother    • Hypertension Mother    • COPD Mother    • Asthma Brother    • Hypertension Sister      Office Visit on 10/27/2022   Component Date Value Ref Range Status   • QT Interval 10/27/2022 390  ms Final   • QTC Interval 10/27/2022 395  ms Final      Adult Transthoracic Echo Complete w/ Color, Spectral and Contrast if Necessary Per Protocol  · Calculated left ventricular 3D EF = 63% Estimated left ventricular EF =   64% Left ventricular ejection fraction appears to be 61 - 65%. Left   ventricular systolic function is normal.  · Left ventricular wall thickness is consistent with mild concentric   hypertrophy.  · The left atrial cavity is mildly dilated.  · Mild aortic valve regurgitation is present.  · Estimated right ventricular systolic pressure from tricuspid   regurgitation is normal (<35 mmHg).         [unfilled]    There is no immunization history on file for this patient.  The following portions of the patient's history were reviewed and updated as appropriate: allergies, current medications, past family history, past medical history, past social history, past surgical history and problem list.    PHQ-9 Total Score:             Physical  Exam  Constitutional:       Appearance: Normal appearance.   HENT:      Head: Normocephalic and atraumatic.      Right Ear: External ear normal.      Left Ear: External ear normal.   Eyes:      General:         Right eye: No discharge.         Left eye: No discharge.      Conjunctiva/sclera: Conjunctivae normal.   Cardiovascular:      Rate and Rhythm: Normal rate and regular rhythm.      Pulses: Normal pulses.      Heart sounds: Murmur heard.   Pulmonary:      Effort: Pulmonary effort is normal. No respiratory distress.      Breath sounds: Normal breath sounds.   Abdominal:      General: There is no distension.      Palpations: Abdomen is soft.      Tenderness: There is no abdominal tenderness.   Musculoskeletal:      Cervical back: Normal range of motion.      Right lower leg: No edema.      Left lower leg: No edema.   Lymphadenopathy:      Cervical: No cervical adenopathy.   Neurological:      Mental Status: She is alert. Mental status is at baseline.   Psychiatric:         Mood and Affect: Mood normal.         Behavior: Behavior normal.         Assessment & Plan    Diagnosis Plan   1. Iron deficiency anemia, unspecified iron deficiency anemia type  Ferritin    Iron Profile      2. Essential hypertension           Orders Placed This Encounter   Procedures   • Ferritin   • Iron Profile     Hypertension; well-controlled current medications, will continue.  Recent labs reviewed, appropriate, patient reports that chronic kidney disease is improving as per nephrology recent evaluation.  Continue to encourage healthy lifestyle.    Iron deficiency anemia; will obtain iron levels as above, adjust supplementation if needed, recent CBC reassuring.  Follow-up in 6 months or sooner if needed.          This document has been electronically signed by Marco A Stanley MD on November 11, 2022 10:49 CST    EMR Dragon/Transciption Disclaimer: Some of this note may be an electronic transcription/translation of spoken language to printed  text.  The electronic translation of spoken language may permit erroneous, or at times, nonsensical words or phrases to be inadvertently transcribed. Although I have reviewed the note for such errors, some may still exist.

## 2022-11-12 LAB
ALBUMIN SERPL-MCNC: 4.1 G/DL (ref 3.5–5.2)
ANION GAP SERPL CALCULATED.3IONS-SCNC: 12 MMOL/L (ref 5–15)
BASOPHILS # BLD AUTO: 0.02 10*3/MM3 (ref 0–0.2)
BASOPHILS NFR BLD AUTO: 0.4 % (ref 0–1.5)
BUN SERPL-MCNC: 19 MG/DL (ref 6–20)
BUN/CREAT SERPL: 9.5 (ref 7–25)
C3 SERPL-MCNC: 118 MG/DL (ref 82–167)
C4 SERPL-MCNC: 29 MG/DL (ref 14–44)
CALCIUM SPEC-SCNC: 9.2 MG/DL (ref 8.6–10.5)
CHLORIDE SERPL-SCNC: 106 MMOL/L (ref 98–107)
CO2 SERPL-SCNC: 21 MMOL/L (ref 22–29)
CREAT SERPL-MCNC: 2 MG/DL (ref 0.57–1)
CREAT UR-MCNC: 61 MG/DL
CRP SERPL-MCNC: <0.3 MG/DL (ref 0–0.5)
DEPRECATED RDW RBC AUTO: 41.6 FL (ref 37–54)
EGFRCR SERPLBLD CKD-EPI 2021: 31.3 ML/MIN/1.73
EOSINOPHIL # BLD AUTO: 0.1 10*3/MM3 (ref 0–0.4)
EOSINOPHIL NFR BLD AUTO: 1.8 % (ref 0.3–6.2)
ERYTHROCYTE [DISTWIDTH] IN BLOOD BY AUTOMATED COUNT: 12.4 % (ref 12.3–15.4)
ERYTHROCYTE [SEDIMENTATION RATE] IN BLOOD: 26 MM/HR (ref 0–20)
FERRITIN SERPL-MCNC: 36.6 NG/ML (ref 13–150)
GLUCOSE SERPL-MCNC: 71 MG/DL (ref 65–99)
HCT VFR BLD AUTO: 37.5 % (ref 34–46.6)
HGB BLD-MCNC: 12.7 G/DL (ref 12–15.9)
IMM GRANULOCYTES # BLD AUTO: 0.01 10*3/MM3 (ref 0–0.05)
IMM GRANULOCYTES NFR BLD AUTO: 0.2 % (ref 0–0.5)
IRON 24H UR-MRATE: 30 MCG/DL (ref 37–145)
IRON SATN MFR SERPL: 9 % (ref 20–50)
LYMPHOCYTES # BLD AUTO: 1.09 10*3/MM3 (ref 0.7–3.1)
LYMPHOCYTES NFR BLD AUTO: 19.4 % (ref 19.6–45.3)
MCH RBC QN AUTO: 31.2 PG (ref 26.6–33)
MCHC RBC AUTO-ENTMCNC: 33.9 G/DL (ref 31.5–35.7)
MCV RBC AUTO: 92.1 FL (ref 79–97)
MONOCYTES # BLD AUTO: 0.42 10*3/MM3 (ref 0.1–0.9)
MONOCYTES NFR BLD AUTO: 7.5 % (ref 5–12)
NEUTROPHILS NFR BLD AUTO: 3.98 10*3/MM3 (ref 1.7–7)
NEUTROPHILS NFR BLD AUTO: 70.7 % (ref 42.7–76)
NRBC BLD AUTO-RTO: 0 /100 WBC (ref 0–0.2)
PHOSPHATE SERPL-MCNC: 3.2 MG/DL (ref 2.5–4.5)
PLATELET # BLD AUTO: 229 10*3/MM3 (ref 140–450)
PMV BLD AUTO: 11.1 FL (ref 6–12)
POTASSIUM SERPL-SCNC: 4.5 MMOL/L (ref 3.5–5.2)
PROT ?TM UR-MCNC: 53.9 MG/DL
PROT/CREAT UR: 883.6 MG/G CREA (ref 0–200)
RBC # BLD AUTO: 4.07 10*6/MM3 (ref 3.77–5.28)
SODIUM SERPL-SCNC: 139 MMOL/L (ref 136–145)
TIBC SERPL-MCNC: 334 MCG/DL (ref 298–536)
TRANSFERRIN SERPL-MCNC: 224 MG/DL (ref 200–360)
URATE SERPL-MCNC: 7.2 MG/DL (ref 2.4–5.7)
WBC NRBC COR # BLD: 5.62 10*3/MM3 (ref 3.4–10.8)

## 2022-11-14 LAB
ANA SER QL: NEGATIVE
CENTROMERE B AB SER-ACNC: <0.2 AI (ref 0–0.9)
CHROMATIN AB SERPL-ACNC: <0.2 AI (ref 0–0.9)
DSDNA AB SER-ACNC: <1 IU/ML (ref 0–9)
ENA JO1 AB SER-ACNC: <0.2 AI (ref 0–0.9)
ENA RNP AB SER-ACNC: <0.2 AI (ref 0–0.9)
ENA SCL70 AB SER-ACNC: <0.2 AI (ref 0–0.9)
ENA SCL70 AB SER-ACNC: <0.2 AI (ref 0–0.9)
ENA SM AB SER-ACNC: <0.2 AI (ref 0–0.9)
ENA SS-A AB SER-ACNC: <0.2 AI (ref 0–0.9)
ENA SS-B AB SER-ACNC: <0.2 AI (ref 0–0.9)
Lab: NORMAL

## 2022-11-14 NOTE — PROGRESS NOTES
[facility]  Administration of Anesthesia  Tami CHAMBERLAIN agree to be cared for by a physician anesthesiologist alone and/or with a nurse anesthetist, who is specially trained to monitor me and give me medicine to put me to sleep or keep me comfortable during my procedure    I understand that my anesthesiologist and/or anesthetist is not an employee or agent of [facility] or [employer]. He or she works for [facility]    As the patient asking for anesthesia services, I agree to:  Allow the anesthesiologist (anesthesia doctor) to give me medicine and do additional procedures as necessary. Some examples are: Starting or using an “IV” to give me medicine, fluids or blood during my procedure, and having a breathing tube placed to help me breathe when I’m asleep (intubation). In the event that my heart stops working properly, I understand that my anesthesiologist will make every effort to sustain my life, unless otherwise directed by [facility] Do Not Resuscitate documents.  Tell my anesthesia doctor before my procedure:  If I am pregnant.  The last time that I ate or drank.  iii. All of the medicines I take (including prescriptions, herbal supplements, and pills I can buy without a prescription (including street drugs/illegal medications). Failure to inform my anesthesiologist about these medicines may increase my risk of anesthetic complications.  iv.If I am allergic to anything or have had a reaction to anesthesia before.  I understand how the anesthesia medicine will help me (benefits).  I understand that with any type of anesthesia medicine there are risks:  The most common risks are: nausea, vomiting, sore throat, muscle soreness, damage to my eyes, mouth, or teeth (from breathing tube placement).  Rare risks include: remembering what happened during my procedure, allergic reactions to medications, injury to my airway, heart, lungs, vision, nerves, or  Please continue with iron supplementation, iron levels still slightly low. muscles and in extremely rare instances death.  My doctor has explained to me other choices available to me for my care (alternatives).  Pregnant Patients (“epidural”):  I understand that the risks of having an epidural (medicine given into my back to help control pain during labor), include itching, low blood pressure, difficulty urinating, headache or slowing of the baby’s heart. Very rare risks include infection, bleeding, seizure, irregular heart rhythms and nerve injury.  Regional Anesthesia (“spinal”, “epidural”, & “nerve blocks”):  I understand that rare but potential complications include headache, bleeding, infection, seizure, irregular heart rhythms, and nerve injury.    _____________________________________________________________________________  Patient (or Representative) Signature/Relationship to Patient  Date   Time    _____________________________________________________________________________   Name (if used)    Language/Organization   Time    _____________________________________________________________________________  Nurse Anesthetist Signature     Date   Time  _____________________________________________________________________________  Anesthesiologist Signature     Date   Time  I have discussed the procedure and information above with the patient (or patient’s representative) and answered their questions. The patient or their representative has agreed to have anesthesia services.    _____________________________________________________________________________  Witness        Date   Time  I have verified that the signature is that of the patient or patient’s representative, and that it was signed before the procedure  Patient Name: Tami Oakes     : 1963                 Printed: 10/15/2024 at 7:43 AM    Medical Record #: [id number]                                            Page 1 of 1  ----------ANESTHESIA CONSENT----------

## 2022-11-15 LAB — CCP IGA+IGG SERPL IA-ACNC: 7 UNITS (ref 0–19)

## 2022-12-28 DIAGNOSIS — I10 ESSENTIAL HYPERTENSION: ICD-10-CM

## 2022-12-28 RX ORDER — LOSARTAN POTASSIUM 100 MG/1
TABLET ORAL
Qty: 90 TABLET | Refills: 0 | Status: SHIPPED | OUTPATIENT
Start: 2022-12-28

## 2022-12-28 NOTE — TELEPHONE ENCOUNTER
Rx Refill Note  Requested Prescriptions     Pending Prescriptions Disp Refills   • losartan (COZAAR) 100 MG tablet [Pharmacy Med Name: LOSARTAN POTASSIUM 100 MG TAB] 90 tablet 0     Sig: TAKE 1/2 TABLET BY MOUTH EVERY MORNING AND TAKE 1/2 TABLET BY MOUTH NIGHTLY      Last office visit with prescribing clinician: 11/11/2022     Next office visit with prescribing clinician: 5/11/2023                         Giovana Knowles MA  12/28/22, 08:07 CST

## 2023-01-13 RX ORDER — AMLODIPINE BESYLATE 10 MG/1
10 TABLET ORAL DAILY
Qty: 90 TABLET | Refills: 3 | Status: SHIPPED | OUTPATIENT
Start: 2023-01-13

## 2023-04-27 ENCOUNTER — OFFICE VISIT (OUTPATIENT)
Dept: CARDIOLOGY | Facility: CLINIC | Age: 43
End: 2023-04-27
Payer: MEDICAID

## 2023-04-27 VITALS
TEMPERATURE: 97.1 F | DIASTOLIC BLOOD PRESSURE: 74 MMHG | HEART RATE: 68 BPM | BODY MASS INDEX: 35.89 KG/M2 | WEIGHT: 265 LBS | HEIGHT: 72 IN | SYSTOLIC BLOOD PRESSURE: 138 MMHG | OXYGEN SATURATION: 94 %

## 2023-04-27 DIAGNOSIS — I35.9 NONRHEUMATIC AORTIC VALVE DISORDER: ICD-10-CM

## 2023-04-27 DIAGNOSIS — N18.32 CHRONIC RENAL IMPAIRMENT, STAGE 3B: ICD-10-CM

## 2023-04-27 DIAGNOSIS — I10 ESSENTIAL HYPERTENSION: ICD-10-CM

## 2023-04-27 DIAGNOSIS — Z98.890 H/O AORTIC ARCH REPAIR: ICD-10-CM

## 2023-04-27 DIAGNOSIS — I71.010 DISSECTION OF ASCENDING AORTA: Primary | ICD-10-CM

## 2023-05-04 RX ORDER — FERROUS SULFATE 325(65) MG
TABLET ORAL
Qty: 15 TABLET | Refills: 0 | Status: SHIPPED | OUTPATIENT
Start: 2023-05-04

## 2023-05-10 ENCOUNTER — TELEPHONE (OUTPATIENT)
Dept: FAMILY MEDICINE CLINIC | Facility: CLINIC | Age: 43
End: 2023-05-10
Payer: MEDICAID

## 2023-05-27 DIAGNOSIS — I10 ESSENTIAL HYPERTENSION: ICD-10-CM

## 2023-05-30 RX ORDER — LOSARTAN POTASSIUM 100 MG/1
TABLET ORAL
Qty: 90 TABLET | Refills: 0 | Status: SHIPPED | OUTPATIENT
Start: 2023-05-30

## 2023-08-28 DIAGNOSIS — I10 ESSENTIAL HYPERTENSION: ICD-10-CM

## 2023-08-28 RX ORDER — LOSARTAN POTASSIUM 100 MG/1
TABLET ORAL
Qty: 90 TABLET | Refills: 0 | Status: SHIPPED | OUTPATIENT
Start: 2023-08-28

## (undated) DEVICE — SUT  GUT PLAIN 2/0 CT1 27IN 843H

## (undated) DEVICE — SUT VIC 3/0 SH 27IN J416H

## (undated) DEVICE — SUT VIC 2/0 CT1 36IN

## (undated) DEVICE — GLV SURG SIGNATURE ESSENTIAL PF LTX SZ7

## (undated) DEVICE — SUT VICRYL O M0-4 27IN ETHCPP71D

## (undated) DEVICE — PAD,ABDOMINAL,8"X10",ST,LF: Brand: MEDLINE

## (undated) DEVICE — GOWN,PREVENTION PLUS,XLNG/XXLARGE,STRL: Brand: MEDLINE

## (undated) DEVICE — TOTAL TRAY, 16FR 10ML SIL FOLEY, URN: Brand: MEDLINE

## (undated) DEVICE — SYS CLS SKIN PREMIERPRO EXOFINFUSION 22CM

## (undated) DEVICE — WRAP LEG 31X48X6IN STRL

## (undated) DEVICE — METER,URINE,400ML,DRAIN BAG,L/F,LL,SLIDE: Brand: MEDLINE

## (undated) DEVICE — STERILE POLYISOPRENE POWDER-FREE SURGICAL GLOVES WITH EMOLLIENT COATING: Brand: PROTEXIS

## (undated) DEVICE — PK MAJ PROC LF 60

## (undated) DEVICE — INTENDED FOR TISSUE SEPARATION, AND OTHER PROCEDURES THAT REQUIRE A SHARP SURGICAL BLADE TO PUNCTURE OR CUT.: Brand: BARD-PARKER ® CARBON RIB-BACK BLADES

## (undated) DEVICE — DRP SURG U/BUTT W/PCH BACK STRL

## (undated) DEVICE — GLV SURG SENSICARE POLYISPRN W/ALOE PF LF 6.5 GRN STRL

## (undated) DEVICE — ELECTRD BLD MEGADYNE 6.5IN SS

## (undated) DEVICE — GOWN,ECLIPSE,FABRIC-REINFORCED,X-LARGE: Brand: MEDLINE

## (undated) DEVICE — GLV SURG NEOLON 2G PF LF 6.5 STRL

## (undated) DEVICE — SPNG LAP 18X18IN LF STRL PK/5

## (undated) DEVICE — PENCL ES MEGADINE EZ/CLEAN BUTN W/HOLSTR 10FT

## (undated) DEVICE — SUT MONOCRYL 4/0 PS2 27IN Y426H ETY426H

## (undated) DEVICE — CYSTO/BLADDER IRRIGATION SET, REGULATING CLAMP

## (undated) DEVICE — SPNG GZ WOVN 4X4IN 12PLY 10/BX STRL

## (undated) DEVICE — CATH FOL LUBRISIL 3WY 18F 5CC

## (undated) DEVICE — DRAPE,LAPAROSCOPY,GUSS,STERILE: Brand: MEDLINE